# Patient Record
Sex: FEMALE | Race: OTHER | HISPANIC OR LATINO | Employment: UNEMPLOYED | URBAN - METROPOLITAN AREA
[De-identification: names, ages, dates, MRNs, and addresses within clinical notes are randomized per-mention and may not be internally consistent; named-entity substitution may affect disease eponyms.]

---

## 2022-10-05 ENCOUNTER — HOSPITAL ENCOUNTER (OUTPATIENT)
Facility: HOSPITAL | Age: 32
Setting detail: OBSERVATION
End: 2022-10-06
Attending: EMERGENCY MEDICINE
Payer: COMMERCIAL

## 2022-10-05 ENCOUNTER — APPOINTMENT (EMERGENCY)
Dept: CT IMAGING | Facility: HOSPITAL | Age: 32
End: 2022-10-05
Payer: COMMERCIAL

## 2022-10-05 DIAGNOSIS — R41.82 ALTERED MENTAL STATUS: Primary | ICD-10-CM

## 2022-10-05 DIAGNOSIS — T44.3X1A ANTICHOLINERGIC DRUG OVERDOSE: ICD-10-CM

## 2022-10-05 DIAGNOSIS — T45.0X2A INTENTIONAL DIPHENHYDRAMINE OVERDOSE, INITIAL ENCOUNTER (HCC): ICD-10-CM

## 2022-10-05 DIAGNOSIS — T14.91XA SUICIDE ATTEMPT (HCC): ICD-10-CM

## 2022-10-05 PROBLEM — T50.902A INTENTIONAL DRUG OVERDOSE (HCC): Status: ACTIVE | Noted: 2022-10-05

## 2022-10-05 LAB
ALBUMIN SERPL BCP-MCNC: 4.2 G/DL (ref 3.5–5)
ALP SERPL-CCNC: 102 U/L (ref 46–116)
ALT SERPL W P-5'-P-CCNC: 27 U/L (ref 12–78)
AMPHETAMINES SERPL QL SCN: NEGATIVE
ANION GAP SERPL CALCULATED.3IONS-SCNC: 15 MMOL/L (ref 4–13)
APAP SERPL-MCNC: <2 UG/ML (ref 10–20)
APTT PPP: 26 SECONDS (ref 23–37)
AST SERPL W P-5'-P-CCNC: 13 U/L (ref 5–45)
BARBITURATES UR QL: NEGATIVE
BASOPHILS # BLD AUTO: 0 THOUSANDS/ΜL (ref 0–0.1)
BASOPHILS NFR BLD AUTO: 0 % (ref 0–1)
BENZODIAZ UR QL: NEGATIVE
BILIRUB SERPL-MCNC: 0.2 MG/DL (ref 0.2–1)
BUN SERPL-MCNC: 8 MG/DL (ref 5–25)
CALCIUM SERPL-MCNC: 9.6 MG/DL (ref 8.3–10.1)
CHLORIDE SERPL-SCNC: 107 MMOL/L (ref 96–108)
CO2 SERPL-SCNC: 21 MMOL/L (ref 21–32)
COCAINE UR QL: NEGATIVE
CREAT SERPL-MCNC: 0.83 MG/DL (ref 0.6–1.3)
EOSINOPHIL # BLD AUTO: 0 THOUSAND/ΜL (ref 0–0.61)
EOSINOPHIL NFR BLD AUTO: 0 % (ref 0–6)
ERYTHROCYTE [DISTWIDTH] IN BLOOD BY AUTOMATED COUNT: 13.2 % (ref 11.6–15.1)
ETHANOL SERPL-MCNC: 29 MG/DL (ref 0–3)
EXT PREG TEST URINE: NEGATIVE
EXT. CONTROL ED NAV: NORMAL
GFR SERPL CREATININE-BSD FRML MDRD: 93 ML/MIN/1.73SQ M
GLUCOSE SERPL-MCNC: 71 MG/DL (ref 65–140)
HCT VFR BLD AUTO: 38 % (ref 34.8–46.1)
HGB BLD-MCNC: 12.2 G/DL (ref 11.5–15.4)
IMM GRANULOCYTES # BLD AUTO: 0.04 THOUSAND/UL (ref 0–0.2)
IMM GRANULOCYTES NFR BLD AUTO: 1 % (ref 0–2)
INR PPP: 1.07 (ref 0.84–1.19)
LYMPHOCYTES # BLD AUTO: 2.3 THOUSANDS/ΜL (ref 0.6–4.47)
LYMPHOCYTES NFR BLD AUTO: 27 % (ref 14–44)
MAGNESIUM SERPL-MCNC: 2.3 MG/DL (ref 1.6–2.6)
MCH RBC QN AUTO: 27.8 PG (ref 26.8–34.3)
MCHC RBC AUTO-ENTMCNC: 32.1 G/DL (ref 31.4–37.4)
MCV RBC AUTO: 87 FL (ref 82–98)
METHADONE UR QL: NEGATIVE
MONOCYTES # BLD AUTO: 0.63 THOUSAND/ΜL (ref 0.17–1.22)
MONOCYTES NFR BLD AUTO: 7 % (ref 4–12)
NEUTROPHILS # BLD AUTO: 5.62 THOUSANDS/ΜL (ref 1.85–7.62)
NEUTS SEG NFR BLD AUTO: 65 % (ref 43–75)
NRBC BLD AUTO-RTO: 0 /100 WBCS
OPIATES UR QL SCN: NEGATIVE
OXYCODONE+OXYMORPHONE UR QL SCN: NEGATIVE
PCP UR QL: NEGATIVE
PLATELET # BLD AUTO: 339 THOUSANDS/UL (ref 149–390)
PMV BLD AUTO: 9.1 FL (ref 8.9–12.7)
POTASSIUM SERPL-SCNC: 3.3 MMOL/L (ref 3.5–5.3)
PROT SERPL-MCNC: 8.5 G/DL (ref 6.4–8.4)
PROTHROMBIN TIME: 13.9 SECONDS (ref 11.6–14.5)
RBC # BLD AUTO: 4.39 MILLION/UL (ref 3.81–5.12)
SALICYLATES SERPL-MCNC: <3 MG/DL (ref 3–20)
SODIUM SERPL-SCNC: 143 MMOL/L (ref 135–147)
THC UR QL: NEGATIVE
WBC # BLD AUTO: 8.59 THOUSAND/UL (ref 4.31–10.16)

## 2022-10-05 PROCEDURE — 96374 THER/PROPH/DIAG INJ IV PUSH: CPT

## 2022-10-05 PROCEDURE — 82077 ASSAY SPEC XCP UR&BREATH IA: CPT | Performed by: EMERGENCY MEDICINE

## 2022-10-05 PROCEDURE — 93005 ELECTROCARDIOGRAM TRACING: CPT

## 2022-10-05 PROCEDURE — G1004 CDSM NDSC: HCPCS

## 2022-10-05 PROCEDURE — 80307 DRUG TEST PRSMV CHEM ANLYZR: CPT | Performed by: EMERGENCY MEDICINE

## 2022-10-05 PROCEDURE — 96361 HYDRATE IV INFUSION ADD-ON: CPT

## 2022-10-05 PROCEDURE — 80053 COMPREHEN METABOLIC PANEL: CPT | Performed by: EMERGENCY MEDICINE

## 2022-10-05 PROCEDURE — 81025 URINE PREGNANCY TEST: CPT | Performed by: EMERGENCY MEDICINE

## 2022-10-05 PROCEDURE — 85025 COMPLETE CBC W/AUTO DIFF WBC: CPT | Performed by: EMERGENCY MEDICINE

## 2022-10-05 PROCEDURE — 99220 PR INITIAL OBSERVATION CARE/DAY 70 MINUTES: CPT | Performed by: INTERNAL MEDICINE

## 2022-10-05 PROCEDURE — 70450 CT HEAD/BRAIN W/O DYE: CPT

## 2022-10-05 PROCEDURE — 80179 DRUG ASSAY SALICYLATE: CPT | Performed by: EMERGENCY MEDICINE

## 2022-10-05 PROCEDURE — 99285 EMERGENCY DEPT VISIT HI MDM: CPT

## 2022-10-05 PROCEDURE — 80143 DRUG ASSAY ACETAMINOPHEN: CPT | Performed by: EMERGENCY MEDICINE

## 2022-10-05 PROCEDURE — 36415 COLL VENOUS BLD VENIPUNCTURE: CPT | Performed by: EMERGENCY MEDICINE

## 2022-10-05 PROCEDURE — 83735 ASSAY OF MAGNESIUM: CPT | Performed by: EMERGENCY MEDICINE

## 2022-10-05 PROCEDURE — 85730 THROMBOPLASTIN TIME PARTIAL: CPT | Performed by: EMERGENCY MEDICINE

## 2022-10-05 PROCEDURE — 99285 EMERGENCY DEPT VISIT HI MDM: CPT | Performed by: EMERGENCY MEDICINE

## 2022-10-05 PROCEDURE — 85610 PROTHROMBIN TIME: CPT | Performed by: EMERGENCY MEDICINE

## 2022-10-05 RX ORDER — LORAZEPAM 2 MG/ML
1 INJECTION INTRAMUSCULAR ONCE
Status: COMPLETED | OUTPATIENT
Start: 2022-10-05 | End: 2022-10-05

## 2022-10-05 RX ADMIN — LORAZEPAM 1 MG: 2 INJECTION INTRAMUSCULAR; INTRAVENOUS at 23:15

## 2022-10-05 RX ADMIN — LORAZEPAM 1 MG: 2 INJECTION INTRAMUSCULAR; INTRAVENOUS at 22:31

## 2022-10-05 RX ADMIN — SODIUM CHLORIDE 1000 ML: 0.9 INJECTION, SOLUTION INTRAVENOUS at 20:44

## 2022-10-06 ENCOUNTER — HOSPITAL ENCOUNTER (INPATIENT)
Facility: HOSPITAL | Age: 32
LOS: 1 days | End: 2022-10-07
Attending: EMERGENCY MEDICINE | Admitting: EMERGENCY MEDICINE
Payer: COMMERCIAL

## 2022-10-06 VITALS
DIASTOLIC BLOOD PRESSURE: 80 MMHG | SYSTOLIC BLOOD PRESSURE: 121 MMHG | RESPIRATION RATE: 18 BRPM | WEIGHT: 203.93 LBS | OXYGEN SATURATION: 100 % | TEMPERATURE: 99.6 F | HEART RATE: 96 BPM

## 2022-10-06 DIAGNOSIS — Z00.8 MEDICAL CLEARANCE FOR PSYCHIATRIC ADMISSION: ICD-10-CM

## 2022-10-06 DIAGNOSIS — T45.0X2A INTENTIONAL DIPHENHYDRAMINE OVERDOSE, INITIAL ENCOUNTER (HCC): ICD-10-CM

## 2022-10-06 DIAGNOSIS — T14.91XA SUICIDE ATTEMPT (HCC): Primary | ICD-10-CM

## 2022-10-06 DIAGNOSIS — T45.0X2A: ICD-10-CM

## 2022-10-06 PROBLEM — Z86.59 HISTORY OF DEPRESSION: Status: ACTIVE | Noted: 2022-10-06

## 2022-10-06 LAB
ALBUMIN SERPL BCP-MCNC: 3.8 G/DL (ref 3.5–5)
ALP SERPL-CCNC: 89 U/L (ref 46–116)
ALT SERPL W P-5'-P-CCNC: 20 U/L (ref 12–78)
ANION GAP SERPL CALCULATED.3IONS-SCNC: 14 MMOL/L (ref 4–13)
AST SERPL W P-5'-P-CCNC: 17 U/L (ref 5–45)
ATRIAL RATE: 119 BPM
ATRIAL RATE: 128 BPM
ATRIAL RATE: 142 BPM
BASOPHILS # BLD AUTO: 0.01 THOUSANDS/ΜL (ref 0–0.1)
BASOPHILS NFR BLD AUTO: 0 % (ref 0–1)
BILIRUB SERPL-MCNC: 0.22 MG/DL (ref 0.2–1)
BUN SERPL-MCNC: 8 MG/DL (ref 5–25)
CALCIUM SERPL-MCNC: 9 MG/DL (ref 8.3–10.1)
CHLORIDE SERPL-SCNC: 106 MMOL/L (ref 96–108)
CO2 SERPL-SCNC: 21 MMOL/L (ref 21–32)
CREAT SERPL-MCNC: 0.69 MG/DL (ref 0.6–1.3)
EOSINOPHIL # BLD AUTO: 0 THOUSAND/ΜL (ref 0–0.61)
EOSINOPHIL NFR BLD AUTO: 0 % (ref 0–6)
ERYTHROCYTE [DISTWIDTH] IN BLOOD BY AUTOMATED COUNT: 13.5 % (ref 11.6–15.1)
GFR SERPL CREATININE-BSD FRML MDRD: 115 ML/MIN/1.73SQ M
GLUCOSE P FAST SERPL-MCNC: 62 MG/DL (ref 65–99)
GLUCOSE SERPL-MCNC: 62 MG/DL (ref 65–140)
HCT VFR BLD AUTO: 36.8 % (ref 34.8–46.1)
HGB BLD-MCNC: 11.6 G/DL (ref 11.5–15.4)
IMM GRANULOCYTES # BLD AUTO: 0.04 THOUSAND/UL (ref 0–0.2)
IMM GRANULOCYTES NFR BLD AUTO: 0 % (ref 0–2)
LYMPHOCYTES # BLD AUTO: 1.9 THOUSANDS/ΜL (ref 0.6–4.47)
LYMPHOCYTES NFR BLD AUTO: 16 % (ref 14–44)
MCH RBC QN AUTO: 28.4 PG (ref 26.8–34.3)
MCHC RBC AUTO-ENTMCNC: 31.5 G/DL (ref 31.4–37.4)
MCV RBC AUTO: 90 FL (ref 82–98)
MONOCYTES # BLD AUTO: 0.73 THOUSAND/ΜL (ref 0.17–1.22)
MONOCYTES NFR BLD AUTO: 6 % (ref 4–12)
NEUTROPHILS # BLD AUTO: 8.99 THOUSANDS/ΜL (ref 1.85–7.62)
NEUTS SEG NFR BLD AUTO: 78 % (ref 43–75)
NRBC BLD AUTO-RTO: 0 /100 WBCS
P AXIS: 41 DEGREES
P AXIS: 47 DEGREES
P AXIS: 50 DEGREES
PLATELET # BLD AUTO: 361 THOUSANDS/UL (ref 149–390)
PMV BLD AUTO: 9.7 FL (ref 8.9–12.7)
POTASSIUM SERPL-SCNC: 3.9 MMOL/L (ref 3.5–5.3)
PR INTERVAL: 142 MS
PR INTERVAL: 154 MS
PR INTERVAL: 162 MS
PROT SERPL-MCNC: 8.1 G/DL (ref 6.4–8.4)
QRS AXIS: 16 DEGREES
QRS AXIS: 21 DEGREES
QRS AXIS: 36 DEGREES
QRSD INTERVAL: 82 MS
QRSD INTERVAL: 88 MS
QRSD INTERVAL: 88 MS
QT INTERVAL: 268 MS
QT INTERVAL: 314 MS
QT INTERVAL: 344 MS
QTC INTERVAL: 412 MS
QTC INTERVAL: 458 MS
QTC INTERVAL: 483 MS
RBC # BLD AUTO: 4.09 MILLION/UL (ref 3.81–5.12)
SODIUM SERPL-SCNC: 141 MMOL/L (ref 135–147)
T WAVE AXIS: 1 DEGREES
T WAVE AXIS: 19 DEGREES
T WAVE AXIS: 9 DEGREES
VENTRICULAR RATE: 119 BPM
VENTRICULAR RATE: 128 BPM
VENTRICULAR RATE: 142 BPM
WBC # BLD AUTO: 11.67 THOUSAND/UL (ref 4.31–10.16)

## 2022-10-06 PROCEDURE — 93005 ELECTROCARDIOGRAM TRACING: CPT

## 2022-10-06 PROCEDURE — 93010 ELECTROCARDIOGRAM REPORT: CPT | Performed by: STUDENT IN AN ORGANIZED HEALTH CARE EDUCATION/TRAINING PROGRAM

## 2022-10-06 PROCEDURE — HZ2ZZZZ DETOXIFICATION SERVICES FOR SUBSTANCE ABUSE TREATMENT: ICD-10-PCS | Performed by: EMERGENCY MEDICINE

## 2022-10-06 PROCEDURE — 93010 ELECTROCARDIOGRAM REPORT: CPT | Performed by: INTERNAL MEDICINE

## 2022-10-06 PROCEDURE — 99291 CRITICAL CARE FIRST HOUR: CPT | Performed by: EMERGENCY MEDICINE

## 2022-10-06 PROCEDURE — 80053 COMPREHEN METABOLIC PANEL: CPT | Performed by: NURSE PRACTITIONER

## 2022-10-06 PROCEDURE — 85025 COMPLETE CBC W/AUTO DIFF WBC: CPT | Performed by: NURSE PRACTITIONER

## 2022-10-06 PROCEDURE — 99217 PR OBSERVATION CARE DISCHARGE MANAGEMENT: CPT | Performed by: INTERNAL MEDICINE

## 2022-10-06 PROCEDURE — 99239 HOSP IP/OBS DSCHRG MGMT >30: CPT | Performed by: INTERNAL MEDICINE

## 2022-10-06 RX ORDER — DEXTROSE, SODIUM CHLORIDE, AND POTASSIUM CHLORIDE 5; .9; .15 G/100ML; G/100ML; G/100ML
100 INJECTION INTRAVENOUS CONTINUOUS
Status: DISCONTINUED | OUTPATIENT
Start: 2022-10-06 | End: 2022-10-06

## 2022-10-06 RX ORDER — SIMETHICONE 80 MG
80 TABLET,CHEWABLE ORAL 4 TIMES DAILY PRN
Status: DISCONTINUED | OUTPATIENT
Start: 2022-10-06 | End: 2022-10-06 | Stop reason: HOSPADM

## 2022-10-06 RX ORDER — SODIUM CHLORIDE 9 MG/ML
125 INJECTION, SOLUTION INTRAVENOUS CONTINUOUS
Status: DISCONTINUED | OUTPATIENT
Start: 2022-10-06 | End: 2022-10-06 | Stop reason: HOSPADM

## 2022-10-06 RX ORDER — DOCUSATE SODIUM 100 MG/1
100 CAPSULE, LIQUID FILLED ORAL 2 TIMES DAILY PRN
Status: DISCONTINUED | OUTPATIENT
Start: 2022-10-06 | End: 2022-10-07 | Stop reason: HOSPADM

## 2022-10-06 RX ORDER — ONDANSETRON 2 MG/ML
4 INJECTION INTRAMUSCULAR; INTRAVENOUS EVERY 6 HOURS PRN
Status: CANCELLED | OUTPATIENT
Start: 2022-10-06

## 2022-10-06 RX ORDER — ONDANSETRON 2 MG/ML
4 INJECTION INTRAMUSCULAR; INTRAVENOUS EVERY 6 HOURS PRN
Status: DISCONTINUED | OUTPATIENT
Start: 2022-10-06 | End: 2022-10-06 | Stop reason: HOSPADM

## 2022-10-06 RX ORDER — SODIUM CHLORIDE 9 MG/ML
125 INJECTION, SOLUTION INTRAVENOUS CONTINUOUS
Status: CANCELLED | OUTPATIENT
Start: 2022-10-06

## 2022-10-06 RX ORDER — LORAZEPAM 2 MG/ML
1 INJECTION INTRAMUSCULAR EVERY 6 HOURS PRN
Status: DISCONTINUED | OUTPATIENT
Start: 2022-10-06 | End: 2022-10-06 | Stop reason: HOSPADM

## 2022-10-06 RX ORDER — ACETAMINOPHEN 325 MG/1
650 TABLET ORAL EVERY 6 HOURS PRN
Status: DISCONTINUED | OUTPATIENT
Start: 2022-10-06 | End: 2022-10-06

## 2022-10-06 RX ORDER — ACETAMINOPHEN 325 MG/1
650 TABLET ORAL EVERY 4 HOURS PRN
Status: CANCELLED | OUTPATIENT
Start: 2022-10-06

## 2022-10-06 RX ORDER — MAGNESIUM HYDROXIDE/ALUMINUM HYDROXICE/SIMETHICONE 120; 1200; 1200 MG/30ML; MG/30ML; MG/30ML
30 SUSPENSION ORAL EVERY 6 HOURS PRN
Status: DISCONTINUED | OUTPATIENT
Start: 2022-10-06 | End: 2022-10-07 | Stop reason: HOSPADM

## 2022-10-06 RX ORDER — LORAZEPAM 2 MG/ML
1 INJECTION INTRAMUSCULAR EVERY 6 HOURS PRN
Status: CANCELLED | OUTPATIENT
Start: 2022-10-06

## 2022-10-06 RX ORDER — ACETAMINOPHEN 325 MG/1
650 TABLET ORAL EVERY 4 HOURS PRN
Status: DISCONTINUED | OUTPATIENT
Start: 2022-10-06 | End: 2022-10-06 | Stop reason: HOSPADM

## 2022-10-06 RX ORDER — ONDANSETRON 2 MG/ML
4 INJECTION INTRAMUSCULAR; INTRAVENOUS EVERY 6 HOURS PRN
Status: DISCONTINUED | OUTPATIENT
Start: 2022-10-06 | End: 2022-10-07 | Stop reason: HOSPADM

## 2022-10-06 RX ORDER — DEXTROSE AND SODIUM CHLORIDE 5; .9 G/100ML; G/100ML
100 INJECTION, SOLUTION INTRAVENOUS CONTINUOUS
Status: DISCONTINUED | OUTPATIENT
Start: 2022-10-06 | End: 2022-10-07 | Stop reason: HOSPADM

## 2022-10-06 RX ORDER — LORAZEPAM 0.5 MG/1
0.5 TABLET ORAL EVERY 4 HOURS PRN
Status: DISCONTINUED | OUTPATIENT
Start: 2022-10-06 | End: 2022-10-06

## 2022-10-06 RX ORDER — ENOXAPARIN SODIUM 100 MG/ML
40 INJECTION SUBCUTANEOUS DAILY
Status: DISCONTINUED | OUTPATIENT
Start: 2022-10-06 | End: 2022-10-07 | Stop reason: HOSPADM

## 2022-10-06 RX ORDER — MAGNESIUM HYDROXIDE/ALUMINUM HYDROXICE/SIMETHICONE 120; 1200; 1200 MG/30ML; MG/30ML; MG/30ML
30 SUSPENSION ORAL EVERY 6 HOURS PRN
Status: DISCONTINUED | OUTPATIENT
Start: 2022-10-06 | End: 2022-10-06 | Stop reason: HOSPADM

## 2022-10-06 RX ORDER — MAGNESIUM HYDROXIDE/ALUMINUM HYDROXICE/SIMETHICONE 120; 1200; 1200 MG/30ML; MG/30ML; MG/30ML
30 SUSPENSION ORAL EVERY 6 HOURS PRN
Status: CANCELLED | OUTPATIENT
Start: 2022-10-06

## 2022-10-06 RX ORDER — ACETAMINOPHEN 325 MG/1
650 TABLET ORAL EVERY 4 HOURS PRN
Status: DISCONTINUED | OUTPATIENT
Start: 2022-10-06 | End: 2022-10-07 | Stop reason: HOSPADM

## 2022-10-06 RX ORDER — SIMETHICONE 80 MG
80 TABLET,CHEWABLE ORAL 4 TIMES DAILY PRN
Status: CANCELLED | OUTPATIENT
Start: 2022-10-06

## 2022-10-06 RX ORDER — TRAZODONE HYDROCHLORIDE 50 MG/1
50 TABLET ORAL
Status: DISCONTINUED | OUTPATIENT
Start: 2022-10-06 | End: 2022-10-07 | Stop reason: HOSPADM

## 2022-10-06 RX ORDER — VENLAFAXINE HYDROCHLORIDE 75 MG/1
75 CAPSULE, EXTENDED RELEASE ORAL 2 TIMES DAILY
COMMUNITY
End: 2022-10-12

## 2022-10-06 RX ORDER — LORAZEPAM 2 MG/ML
2 INJECTION INTRAMUSCULAR EVERY 4 HOURS PRN
Status: DISCONTINUED | OUTPATIENT
Start: 2022-10-06 | End: 2022-10-07 | Stop reason: HOSPADM

## 2022-10-06 RX ORDER — CLONIDINE HYDROCHLORIDE 0.1 MG/1
0.1 TABLET ORAL EVERY 6 HOURS PRN
Status: DISCONTINUED | OUTPATIENT
Start: 2022-10-06 | End: 2022-10-07 | Stop reason: HOSPADM

## 2022-10-06 RX ADMIN — DEXTROSE AND SODIUM CHLORIDE 100 ML/HR: 5; .9 INJECTION, SOLUTION INTRAVENOUS at 15:59

## 2022-10-06 RX ADMIN — ENOXAPARIN SODIUM 40 MG: 100 INJECTION SUBCUTANEOUS at 15:00

## 2022-10-06 RX ADMIN — SODIUM CHLORIDE 125 ML/HR: 0.9 INJECTION, SOLUTION INTRAVENOUS at 01:29

## 2022-10-06 RX ADMIN — SODIUM CHLORIDE 125 ML/HR: 0.9 INJECTION, SOLUTION INTRAVENOUS at 10:10

## 2022-10-06 RX ADMIN — LORAZEPAM 1 MG: 2 INJECTION, SOLUTION INTRAMUSCULAR; INTRAVENOUS at 01:47

## 2022-10-06 NOTE — DISCHARGE INSTR - OTHER ORDERS
If you or someone you know is struggling or in crisis, help is available  Call or text [phone] or chat [employer]  You can also reach Crisis Text Line by texting [facility] to [phone]  When you need someone to listen, the [employer] is available for 16 hours a day, 7 days a week, from the time of 7-10am and 2pm-2am   It is not available from the hours of 2am-6am and 10am-2pm  A representative can be reached at [phone]

## 2022-10-06 NOTE — ED ATTENDING ATTESTATION
10/5/2022  I, 524 Dr Gage Leblanc, DO, saw and evaluated the patient  I have discussed the patient with the resident/non-physician practitioner and agree with the resident's/non-physician practitioner's findings, Plan of Care, and MDM as documented in the resident's/non-physician practitioner's note, except where noted  All available labs and Radiology studies were reviewed  I was present for key portions of any procedure(s) performed by the resident/non-physician practitioner and I was immediately available to provide assistance  At this point I agree with the current assessment done in the Emergency Department  I have conducted an independent evaluation of this patient a history and physical is as follows:    ED Course     Patient's SO at bedside  He states that she does have a history of depression where she was admitted greater than 5 years ago outside of [address] [place] [address]  She did not have a suicide attempts at that time was making suicidal statements  At that time she was placed on medications which she has been taking  Today he reports that she was sleeping but this is normal for her  He went to work  He reports that he was receiving text messages from her stating that she was suicidal   He reports this this is not something that is abnormal   However, text messages that followed were  He did video chat with her and noticed that she was confused and altered  Patient's sister called her significant other and stated that she was sending her messages that were confusing  Patient's  called 46 for work and came to meet her here  Patient's mother-in-law at the house currently looking for pills that could be missing  It was noticed that she did notice some vodka missing  Patient on exam is alert but nonverbal   EOMI  Patient's pupils are 4-5 sluggish  She is maintaining airway and secretions  No stridor  No drooling  Patient will withdraw to pain  She does have some tremors but no clonus  She is tachycardic without any murmurs  She is tachypneic with lungs clear to auscultation bilaterally  Mucous membranes are dry  Cap refill less the 2 seconds  Patient can move bilateral upper extremities and lower extremities independently  She does not follow commands    While in the room, patient has mother and lab did call and state that she did take some sleeping pills  There is a number of Benadryl missing  Unknown dosing an unknown amount of pills  Patient is tachycardic, confused, dry c/w anticholinergic toxicity  Patient's EKG initially shows sinus tachycardic but no ischemic changes  Will continue with workup with IV fluids, continuous monitoring as well as observation/one-to-one  Once labs results will discuss with poison Control  Given patient's past history of worsening depression and statements via text message will keep on 1:1, possible admission for psych clearance and psych consult  Disclosure: Voice to text software was used in the preparation of this document and could have resulted in translational errors  Occasional wrong word or "sound a like" substitutions may have occurred due to the inherent limitations of voice recognition software  Read the chart carefully and recognize, using context, where substitutions have occurred               Critical Care Time  Procedures
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
## 2022-10-06 NOTE — UTILIZATION REVIEW
Initial Clinical Review    Admission: Date/Time/Statement:   Admission Orders (From admission, onward)     Ordered        10/05/22 2343  Place in Observation  Once                      Orders Placed This Encounter   Procedures    Place in Observation     Standing Status:   Standing     Number of Occurrences:   1     Order Specific Question:   Level of Care     Answer:   Med Surg [16]     ED Arrival Information     Expected   -    Arrival   10/5/2022 20:15    Acuity   Emergent            Means of arrival   Ambulance    Escorted by   [facility] ([address])    Service   Hospitalist    Admission type   Emergency            Arrival complaint   intoxicated           Chief Complaint   Patient presents with    Alcohol Intoxication     Patient presenting with potential alcohol intoxication and or drug use  Unknown story due to language barrier at scene  Patient not speaking, has eye movements  Potential polysubstance       Initial Presentation: 28 y o  female presents to ED via  EMS from home  Patient  Nonverbal, staring blankly, agitated, unable to get any info  Spouse called  Patient, daughter picked up phone, stated  Patient not responding,  Unable to tell what was  Wrong with her    Called  911  Prior to calling his wife he received multiple text messages from her verbalizing concern she would lose her college scholarship; she texted him stating she was having a panic attack, verbalized suicidal thoughts  He became concerned when she text him, "Thanks for being the great father that you are "  His mother went to the house and saw that she had vomited blue pills; near patient was a bottle of Smirnoff Ice and pills: diphenhydramine 50 mg  PMH  Is depression    Admit  Observation  With  Suicide attempt, Intentional benadryl overdose and plan is  1:1  Observation, pschy consult, toxicology consult, tele, patel  And IVF                ED Triage Vitals   Temperature Pulse Respirations Blood Pressure SpO2   10/05/22 2117 10/05/22 2029 10/05/22 2029 10/05/22 2029 10/05/22 2029   97 6 °F (36 4 °C) (!) 142 (!) 24 126/81 100 %      Temp Source Heart Rate Source Patient Position - Orthostatic VS BP Location FiO2 (%)   10/05/22 2117 10/05/22 2029 10/05/22 2029 10/05/22 2029 --   Oral Monitor Lying Left arm       Pain Score       10/06/22 0246       No Pain          Wt Readings from Last 1 Encounters:   10/06/22 92 5 kg (203 lb 14 8 oz)     Additional Vital Signs:   98 °F (36 7 °C) 135 Abnormal  20 123/76 92 100 % None (Room air) Lying    10/06/22 0054 97 7 °F (36 5 °C) 133 Abnormal  20 99/58 -- 100 % None (Room air) Lying   10/06/22 0029 -- 126 Abnormal  20 120/69 -- 100 % None (Room air) Lying   10/05/22 2245 -- 124 Abnormal  19 121/76 94 100 % None (Room air) Lying   10/05/22 2117 97 6 °F (36 4 °C) 132 Abnormal  -- 106/65 -- -- -- --   10/05/22 2045 -- -- -- -- -- -- None (Room air) --   10/05/22 2029 -- 142 Abnormal  24 Abnormal  126/81 -- 100 % None (Room air) Lying       Pertinent Labs/Diagnostic Test Results:   CT head without contrast   Final Result by Yola Brunson MD (10/05 2111)      No acute intracranial abnormality                    Workstation performed: [id number]               Results from last 7 days   Lab Units 10/05/22  2043   WBC Thousand/uL 8 59   HEMOGLOBIN g/dL 12 2   HEMATOCRIT % 38 0   PLATELETS Thousands/uL 339   NEUTROS ABS Thousands/µL 5 62         Results from last 7 days   Lab Units 10/05/22  2043   SODIUM mmol/L 143   POTASSIUM mmol/L 3 3*   CHLORIDE mmol/L 107   CO2 mmol/L 21   ANION GAP mmol/L 15*   BUN mg/dL 8   CREATININE mg/dL 0 83   EGFR ml/min/1 73sq m 93   CALCIUM mg/dL 9 6   MAGNESIUM mg/dL 2 3     Results from last 7 days   Lab Units 10/05/22  2043   AST U/L 13   ALT U/L 27   ALK PHOS U/L 102   TOTAL PROTEIN g/dL 8 5*   ALBUMIN g/dL 4 2   TOTAL BILIRUBIN mg/dL 0 20         Results from last 7 days   Lab Units 10/05/22  2043   GLUCOSE RANDOM mg/dL 71           Results from last 7 days   Lab Units 10/05/22  2131   PROTIME seconds 13 9   INR  1 07   PTT seconds 26           Results from last 7 days   Lab Units 10/05/22  2227   AMPH/METH  Negative   BARBITURATE UR  Negative   BENZODIAZEPINE UR  Negative   COCAINE UR  Negative   METHADONE URINE  Negative   OPIATE UR  Negative   PCP UR  Negative   THC UR  Negative     Results from last 7 days   Lab Units 10/05/22  2043   ETHANOL LVL mg/dL 29*   ACETAMINOPHEN LVL ug/mL <2*   SALICYLATE LVL mg/dL <3*             ED Treatment:   Medication Administration from 10/05/2022 2015 to 10/06/2022 0045       Date/Time Order Dose Route Action Comments     10/05/2022 2144 sodium chloride 0 9 % bolus 1,000 mL 0 mL Intravenous Stopped      10/05/2022 2044 sodium chloride 0 9 % bolus 1,000 mL 1,000 mL Intravenous New Bag      10/05/2022 2231 LORazepam (ATIVAN) injection 1 mg 1 mg Intravenous Given      10/05/2022 2315 LORazepam (ATIVAN) injection 1 mg 1 mg Intravenous Given         History reviewed  No pertinent past medical history    Present on Admission:   [facility] attempt [facility])   Intentional diphenhydramine overdose ([facility] )      Admitting Diagnosis: Alcohol intoxication ([facility] ) [F10 929]  Suicide attempt ([facility] ) Damion Reynoso  Altered mental status [R41 82]  Anticholinergic drug overdose [T44 3X1A]  Intentional diphenhydramine overdose, initial encounter ([facility] ) Derek Meyer  Age/Sex: 28 y o  female  Admission Orders:  Scheduled Medications:     Continuous IV Infusions:  sodium chloride, 125 mL/hr, Intravenous, Continuous      PRN Meds:  acetaminophen, 650 mg, Oral, Q4H PRN  aluminum-magnesium hydroxide-simethicone, 30 mL, Oral, Q6H PRN  LORazepam, 1 mg, Intravenous, Q6H PRN  ondansetron, 4 mg, Intravenous, Q6H PRN  simethicone, 80 mg, Oral, 4x Daily PRN    1:1  Observation  24 hr tele    IP CONSULT TO TOXICOLOGY  IP CONSULT TO PSYCHIATRY    Network Utilization Review Department  ATTENTION: Please call with any questions or concerns to [phone] and carefully listen to the prompts so that you are directed to the right person  All voicemails are confidential   Brianna Skipper all requests for admission clinical reviews, approved or denied determinations and any other requests to dedicated fax number below belonging to the campus where the patient is receiving treatment   List of dedicated fax numbers for the Facilities:  [address] DENIALS (Administrative/Medical Necessity) [phone]   [address] (Maternity/NICU/Pediatrics) [phone]   [address]  [address] [facility] [place] [address] [id number] [address] [place] [address] [phone]

## 2022-10-06 NOTE — DISCHARGE SUMMARY
[address] [place] [address]  Discharge- Jennifer Juarez 1990, 28 y o  female MRN: [id number]  Unit/Bed#: E2 -01 Encounter: [id number]  Primary Care Provider: No primary care provider on file  Date and time admitted to hospital: 10/5/2022  8:16 PM    * Suicide attempt [facility])  Assessment & Plan  · Suicide attempt with Benadryl overdose  · See below plan intentional diphenhydramine overdose  · Continual observation  · Psychiatry consult at [address] [place] [address]    History of depression  Assessment & Plan  · Spouse reports patient is on 2 medications, last taken this morning  No known overdose of home meds per spouse  · Will need med reconciliation    Intentional diphenhydramine overdose ([address] )  Assessment & Plan  · Spouse reports patient overdosed with Benadryl 50 mg   Drank pills with Smirnoff Ice cooler  · ER discussed with poison control, shortage of physostigmine, recommended observe for 6-8 hours, p r n  benzodiazepine for agitation  · UDS, acetaminophen and salicylate level normal  · IV hydration  · Rodriguez catheter for I&O given risk for urinary retention  · IV lorazepam p r n  agitation  · Monitor temperature closely  · Monitor on telemetry  Discussed case with toxicology recommending transfer to detox Unit at [address] [place] [address]   Transition of Care Discharge Summary - [facility]    Patient Information: Jennifer Juarez 28 y o  female MRN: [id number]  Unit/Bed#: E2 -01 Encounter: [id number]    Discharging Physician / Practitioner: Milad Thomas  PCP: No primary care provider on file    Admission Date: 10/5/2022  Discharge Date: 10/06/22    Disposition:      Inpatient Behavioral Health at [address] [place] [address] Toxicology      Reason for Admission:  Intentional drug overdose    Discharge Diagnoses:     Principal Problem:    Suicide attempt [facility])  Active Problems:    Intentional diphenhydramine overdose ([facility] )    History of depression  Resolved Problems:    * No resolved hospital problems  *      Consultations During Hospital Stay:  · IP CONSULT TO TOXICOLOGY  · IP CONSULT TO PSYCHIATRY      Procedures Performed:     · none    Medication Adjustments and Discharge Medications:  · Medication Dosing Tapers - Please refer to Discharge Medication List for details on any medication dosing tapers (if applicable to patient)  · Discharge Medication List: See after visit summary for reconciled discharge medications  Wound Care Recommendations:  When applicable, please see wound care section of After Visit Summary  Diet Recommendations at Discharge:  Diet -        Diet Orders   (From admission, onward)             Start     Ordered    10/06/22 0713  Diet Regular; Finger Foods; Finger Foods  Diet effective now        Comments: FINGER FOODS   References:    Nutrtion Support Algorithm Enteral vs  Parenteral   Question Answer Comment   Diet Type Regular    Regular Finger Foods    Other Restriction(s): Finger Foods    Special Instructions Disposable Meal    Special Instructions Plastic Utensil Only    RD to adjust diet per protocol? Yes        10/06/22 0712              Fluid Restriction - No Fluid Restriction at Discharge  Significant Findings / Test Results:     CT head without contrast    Result Date: 10/5/2022  · Impression: No acute intracranial abnormality  Workstation performed: [id number]       Hospital Course:     Vitaliy Petersen is a 28 y o  female patient who originally presented to the hospital on 10/5/2022 due to suicide attempt with intentional diphenhydramine overdose  Patient took about Benadryl 50 mg tablets, with alcohol  Found by her  to be confused and brought to the ED  Patient encephalopathic, restless discussed with toxicology, Dr Lurena Alpers recommending transfer to detox Unit to be evaluated by and manage by Toxicology    Patient will also need evaluation by Psychiatry which can likely be done at [facility] as well  Please see above problem list for further details  Condition at Discharge: good     Discharge Day Visit / Exam:     Subjective:  Patient seen examined at bedside, confused, denies any complaints    Vitals: Blood Pressure: 121/80 (10/06/22 1132)  Pulse: 96 (10/06/22 1132)  Temperature: 99 6 °F (37 6 °C) (10/06/22 1132)  Temp Source: Temporal (10/06/22 1132)  Respirations: 18 (10/06/22 1132)  Weight - Scale: 92 5 kg (203 lb 14 8 oz) (10/06/22 0054)  SpO2: 100 % (10/06/22 1132)    Physical Exam:    Constitutional: Patient is oriented to person, place and time  HEENT:  Normocephalic, atraumatic  Cardiovascular: Normal S1S2, tachycardia, No murmurs/rubs/gallops appreciated  Pulmonary:  Bilateral air entry, No rhonchi/rales/wheezing appreciated  Abdominal: Soft, Bowel sounds present, Non-tender, Non-distended  Extremities:  No cyanosis, clubbing or edema  Neurological:  Awake, alert, mydriasis    Discharge instructions/Information to patient and family:   See after visit summary section titled Discharge Instructions for information provided to patient and family  Planned Readmission: no     Discharge Statement:  I spent 35 minutes discharging the patient  This time was spent on the day of discharge  I had direct contact with the patient on the day of discharge  Greater than 50% of the total time was spent examining patient, answering all patient questions, arranging and discussing plan of care with patient as well as directly providing post-discharge instructions  Additional time then spent on discharge activities      ** Please Note: This note has been constructed using a voice recognition system **

## 2022-10-06 NOTE — ED NOTES
Patient changed into paper scrubs  Patient now verbal with  in little spurts   heard from mother and he is reporting she took diphenhydramine        Carlo Nunez RN  10/05/22 3839

## 2022-10-06 NOTE — ASSESSMENT & PLAN NOTE
· Presented to the [facility] ED yesterday 10/5/22 due to suicidal ideation and suicide attempt via Benadryl overdose (on an unknown quantity of Benadryl pills)  · Presented to the [facility] ED yesterday evening (around 9:30 PM) 10/6/22 confused, blankly staring into space, unable to follow commands  · Per chart review, the significant other's mother went to the patient's house and determined that there were a number of Benadryl (50 mg tablets) missing  · Patient was assessed in the ED and found to have signs of anticholingeric toxicity  She was found to be confused, tachycardic, dry  EKG obtained in the ED 10/5 showed sinus tachycardia without ischemic changes  Patient was started on IV fluids and admitted to the [address] [place] [address] for further management  · In the hospital the patient was continued on IV fluids (normal saline) and had a patel placed for urinary retention  She received three administrations of ativan 1 mg IM overnight and was placed into restraints overnight as she continued to try and get out of bed and tried to pull out her patel  · On assessment Cecilia reports that on 10/5 prior to ED presentation she was drinking 4 bottles of Smirnoff Ice and  took a bottle of Benadryl with the intent to die  · Admit patient to medical detox unit and continue supportive care, including multivitamin, IV fluids (maintained on D5 normal saline at 100 ml/hr), benzodiazepines (ativan 2 mg Q4HR PRN agitation or heart rate >120),  tylenol (650 mg Q4HR PRN fever, pain, headaches), Zofran (4 mg IV PRN nausea and vomiting)  · Patient will be continued on telemetry for 24 hours due to drug overdose known to cause arrhythmia  · Patient will be maintained on 1:1 continual observation at this time due to suicide attempt

## 2022-10-06 NOTE — ASSESSMENT & PLAN NOTE
· Spouse reports patient is on 2 medications, last taken this morning  No known overdose of home meds per spouse    · Will need med reconciliation
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
## 2022-10-06 NOTE — PLAN OF CARE
Problem: COPING  Goal: Pt/Family able to verbalize concerns and demonstrate effective coping strategies  Description: INTERVENTIONS:  - Assist patient/family to identify coping skills, available support systems and cultural and spiritual values  - Provide emotional support, including active listening and acknowledgement of concerns of patient and caregivers  - Reduce environmental stimuli, as able  - Provide patient education  - Assess for spiritual pain/suffering and initiate spiritual care, including notification of Pastoral Care or ravinder based community as needed  - Assess effectiveness of coping strategies  Outcome: Progressing     Problem: DECISION MAKING  Goal: Pt/Family able to effectively weigh alternatives and participate in decision making related to treatment and care  Description: INTERVENTIONS:  - Identify decision maker  - Determine when there are differences among patient's view, family's view, and healthcare provider's view of patient condition and care goals  - Facilitate patient/family articulation of goals for care  - Help patient/family identify pros/cons of alternative solutions  - Provide information as requested by patient/family  - Respect patient/family rights related to privacy and sharing information   - Serve as a liaison between patient, family and health care team  - Initiate consults as appropriate (Ethics Team, Palliative Care, Family Care Conference, etc )  Outcome: Progressing     Problem: SELF HARM  Goal: Effect of psychiatric condition will be minimized and patient will be protected from self harm  Description: INTERVENTIONS:  - Assess impact of patient's symptoms on level of functioning, self-care needs and offer support as indicated  - Assess patient/family knowledge of depression, impact on illness and need for teaching  - Provide emotional support, presence and reassurance  - Assess for possible suicidal thoughts, ideation or self-harm   If patient expresses suicidal thoughts or statements do not leave alone, notify physician/AP immediately, initiate suicide precautions, and determine need for continual observation    - initiate consults and referrals as appropriate (a mental health professional, Spiritual Care  Outcome: Progressing

## 2022-10-06 NOTE — ED NOTES
Telemetry called to the floor   Available on arrival      Major, [address]  10/06/22 0031

## 2022-10-06 NOTE — PLAN OF CARE
Problem: Prexisting or High Potential for Compromised Skin Integrity  Goal: Skin integrity is maintained or improved  Description: INTERVENTIONS:  - Identify patients at risk for skin breakdown  - Assess and monitor skin integrity  - Assess and monitor nutrition and hydration status  - Monitor labs   - Assess for incontinence   - Turn and reposition patient  - Assist with mobility/ambulation  - Relieve pressure over bony prominences  - Avoid friction and shearing  - Provide appropriate hygiene as needed including keeping skin clean and dry  - Evaluate need for skin moisturizer/barrier cream  - Collaborate with interdisciplinary team   - Patient/family teaching  - Consider wound care consult   Outcome: Progressing     Problem: SAFETY ADULT  Goal: Patient will remain free of falls  Description: INTERVENTIONS:  -  Assess patient's ability to carry out ADLs; assess patient's baseline for ADL function and identify physical deficits which impact ability to perform ADLs (bathing, care of mouth/teeth, toileting, grooming, dressing, etc )  - Assess/evaluate cause of self-care deficits   - Assess range of motion  - Assess patient's mobility; develop plan if impaired  - Assess patient's need for assistive devices and provide as appropriate  - Encourage maximum independence but intervene and supervise when necessary  - Involve family in performance of ADLs  - Assess for home care needs following discharge   - Consider OT consult to assist with ADL evaluation and planning for discharge  - Provide patient education as appropriate  Outcome: Progressing     Problem: COPING  Goal: Pt/Family able to verbalize concerns and demonstrate effective coping strategies  Description: INTERVENTIONS:  - Assist patient/family to identify coping skills, available support systems and cultural and spiritual values  - Provide emotional support, including active listening and acknowledgement of concerns of patient and caregivers  - Reduce environmental stimuli, as able  - Provide patient education  - Assess for spiritual pain/suffering and initiate spiritual care, including notification of Pastoral Care or ravinder based community as needed  - Assess effectiveness of coping strategies  Outcome: Progressing     Problem: COPING  Goal: Will report anxiety at manageable levels  Description: INTERVENTIONS:  - Administer medication as ordered  - Teach and encourage coping skills  - Provide emotional support  - Assess patient/family for anxiety and ability to cope  Outcome: Progressing     Problem: SELF HARM  Goal: Effect of psychiatric condition will be minimized and patient will be protected from self harm  Description: INTERVENTIONS:  - Assess impact of patient's symptoms on level of functioning, self-care needs and offer support as indicated  - Assess patient/family knowledge of depression, impact on illness and need for teaching  - Provide emotional support, presence and reassurance  - Assess for possible suicidal thoughts, ideation or self-harm  If patient expresses suicidal thoughts or statements do not leave alone, notify physician/AP immediately, initiate suicide precautions, and determine need for continual observation    - initiate consults and referrals as appropriate (a mental health professional, Spiritual Care  Outcome: Progressing     Problem: CONFUSION/THOUGHT DISTURBANCE  Goal: Thought disturbances (confusion, delirium, depression, dementia or psychosis) are managed to maintain or return to baseline mental status and functional level  Description: INTERVENTIONS:  - Assess for possible contributors to  thought disturbance, including but not limited to medications, infection, impaired vision or hearing, underlying metabolic abnormalities, dehydration, respiratory compromise,  psychiatric diagnoses and notify attending PHYSICAN/AP  - Monitor and intervene to maintain adequate nutrition, hydration, elimination, sleep and activity  - Decrease environmental stimuli, including noise as appropriate  - Provide frequent contacts to provide refocusing, direction and reassurance as needed  Approach patient calmly with eye contact and at their level    - [place] high risk fall precautions, aspiration precautions and other safety measures, as indicated  - If delirium suspected, notify physician/AP of change in condition and request immediate in-person evaluation  - Pursue consults as appropriate including Geriatric (campus dependent), OT for cognitive evaluation/activity planning, psychiatric, pastoral care, etc   Outcome: Progressing

## 2022-10-06 NOTE — PLAN OF CARE
Problem: SELF HARM  Goal: Effect of psychiatric condition will be minimized and patient will be protected from self harm  Description: INTERVENTIONS:  - Assess impact of patient's symptoms on level of functioning, self-care needs and offer support as indicated  - Assess patient/family knowledge of depression, impact on illness and need for teaching  - Provide emotional support, presence and reassurance  - Assess for possible suicidal thoughts, ideation or self-harm  If patient expresses suicidal thoughts or statements do not leave alone, notify physician/AP immediately, initiate suicide precautions, and determine need for continual observation  - initiate consults and referrals as appropriate (a mental health professional, Spiritual Care  Outcome: Progressing     Problem: CONFUSION/THOUGHT DISTURBANCE  Goal: Thought disturbances (confusion, delirium, depression, dementia or psychosis) are managed to maintain or return to baseline mental status and functional level  Description: INTERVENTIONS:  - Assess for possible contributors to  thought disturbance, including but not limited to medications, infection, impaired vision or hearing, underlying metabolic abnormalities, dehydration, respiratory compromise,  psychiatric diagnoses and notify attending PHYSICAN/AP  - Monitor and intervene to maintain adequate nutrition, hydration, elimination, sleep and activity  - Decrease environmental stimuli, including noise as appropriate  - Provide frequent contacts to provide refocusing, direction and reassurance as needed  Approach patient calmly with eye contact and at their level    - [place] high risk fall precautions, aspiration precautions and other safety measures, as indicated  - If delirium suspected, notify physician/AP of change in condition and request immediate in-person evaluation  - Pursue consults as appropriate including Geriatric (campus dependent), OT for cognitive evaluation/activity planning, psychiatric, pastoral care, etc   Outcome: Progressing     Problem: COPING  Goal: Pt/Family able to verbalize concerns and demonstrate effective coping strategies  Description: INTERVENTIONS:  - Assist patient/family to identify coping skills, available support systems and cultural and spiritual values  - Provide emotional support, including active listening and acknowledgement of concerns of patient and caregivers  - Reduce environmental stimuli, as able  - Provide patient education  - Assess for spiritual pain/suffering and initiate spiritual care, including notification of Pastoral Care or ravinder based community as needed  - Assess effectiveness of coping strategies  Outcome: Progressing  Goal: Will report anxiety at manageable levels  Description: INTERVENTIONS:  - Administer medication as ordered  - Teach and encourage coping skills  - Provide emotional support  - Assess patient/family for anxiety and ability to cope  Outcome: Progressing     Problem: PAIN - ADULT  Goal: Verbalizes/displays adequate comfort level or baseline comfort level  Description: Interventions:  - Encourage patient to monitor pain and request assistance  - Assess pain using appropriate pain scale  - Administer analgesics based on type and severity of pain and evaluate response  - Implement non-pharmacological measures as appropriate and evaluate response  - Consider cultural and social influences on pain and pain management  - Notify physician/advanced practitioner if interventions unsuccessful or patient reports new pain  Outcome: Progressing     Problem: Prexisting or High Potential for Compromised Skin Integrity  Goal: Skin integrity is maintained or improved  Description: INTERVENTIONS:  - Identify patients at risk for skin breakdown  - Assess and monitor skin integrity  - Assess and monitor nutrition and hydration status  - Monitor labs   - Assess for incontinence   - Turn and reposition patient  - Assist with mobility/ambulation  - Relieve pressure over bony prominences  - Avoid friction and shearing  - Provide appropriate hygiene as needed including keeping skin clean and dry  - Evaluate need for skin moisturizer/barrier cream  - Collaborate with interdisciplinary team   - Patient/family teaching  - Consider wound care consult   Outcome: Progressing

## 2022-10-06 NOTE — ASSESSMENT & PLAN NOTE
· Presented to the [facility] yesterday 10/5/22 due to suicidal ideation and suicide attempt via Benadryl overdose (on an unknown quantity of Benadryl pills)  · Presented to the [facility] ED yesterday evening (around 9:30 PM) 10/6/22 confused, blankly staring into space, unable to follow commands  · Per chart review, the significant other's mother went to the patient's house and determined that there were a number of Benadryl (50 mg tablets) missing  · On assessment Yesicaa Manual reports that on 10/5 prior to ED presentation she was drinking 4 bottles of [employer] and  took a bottle of Benadryl with the intent to die  · Reports that she has been experiencing increased depression and has been feeling suicidal due to ongoing relationship issues and family stressors as well as concerns that she would lose her college scholarship (she is currently attending college online and pursuing a degree in behavioral sciences)  Deborha Manual reports that she feels safe at her home and states that her relationship is not abusive  She states that she feels "trapped" and that the work she does around the house (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

## 2022-10-06 NOTE — H&P
[facility]  H&P Yan Juarez 1990, 28 y o  female MRN: [id number]  Unit/Bed#: 5T DETOX 501-01 Encounter: [id number]  Primary Care Provider: No primary care provider on file  Date and time admitted to hospital: 10/6/2022  1:47 PM    Reason for Admission/Principal Problem: Benadryl overdose and resulting anticholinergic toxidrome  Admitting Provider: Rocky Don  Attending Provider: Tommy Bautista DO   10/6/2022  1:47 PM    Admit patient to medical detox unit and continue supportive care, including multivitamin, IV fluids (maintained on D5 normal saline at 100 ml/hr), benzodiazepines (ativan 2 mg Q4HR PRN agitation or heart rate >120),  tylenol (650 mg Q4HR PRN fever, pain, headaches), Zofran (4 mg IV PRN nausea and vomiting)  Patient will be continued on telemetry for 24 hours due to drug overdose known to cause arrhythmia  Patient will be maintained on 1:1 continual observation at this time due to suicide attempt  Case management consultation to assist with disposition planning  * Intentional diphenhydramine overdose ([facility] [address] )  Assessment & Plan  · Presented to the [place] [address] ED yesterday 10/5/22 due to suicidal ideation and suicide attempt via Benadryl overdose (on an unknown quantity of Benadryl pills)  · Presented to the [facility] ED yesterday evening (around 9:30 PM) 10/6/22 confused, blankly staring into space, unable to follow commands  · Per chart review, the significant other's mother went to the patient's house and determined that there were a number of Benadryl (50 mg tablets) missing  · Patient was assessed in the ED and found to have signs of anticholingeric toxicity  She was found to be confused, tachycardic, dry  EKG obtained in the ED 10/5 showed sinus tachycardia without ischemic changes  Patient was started on IV fluids and admitted to the [address] [place] [address] for further management     · In the hospital the patient was continued on IV fluids (normal saline) and had a patel placed for urinary retention  She received three administrations of ativan 1 mg IM overnight and was placed into restraints overnight as she continued to try and get out of bed and tried to pull out her patel  · On assessment Cecilia reports that on 10/5 prior to ED presentation she was drinking 4 bottles of Smirnoff Ice and  took a bottle of Benadryl with the intent to die  · Admit patient to medical detox unit and continue supportive care, including multivitamin, IV fluids (maintained on D5 normal saline at 100 ml/hr), benzodiazepines (ativan 2 mg Q4HR PRN agitation or heart rate >120),  tylenol (650 mg Q4HR PRN fever, pain, headaches), Zofran (4 mg IV PRN nausea and vomiting)  · Patient will be continued on telemetry for 24 hours due to drug overdose known to cause arrhythmia  · Patient will be maintained on 1:1 continual observation at this time due to suicide attempt  History of depression  Assessment & Plan  · Per chart review, patient's significant other arrived at bedside shortly after the patient's presentation to the hospital and reported that she has a history of depression and was hospitalized for mental health in the past (approximately 5 years ago) in the setting of suicidal statements  · On assessment Cecilia reports that she has been experiencing increased depression and has been feeling suicidal due to ongoing relationship issues and family stressors as well as concerns that she would lose her college scholarship (she is currently attending college online and pursuing a degree in behavioral sciences)  Dev Blevinsfilippo reports that she feels safe at her home and states that her relationship is not abusive  She states that she feels "trapped" and that the work she does around the house (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, cleans, 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) "is never enough"   She reports that she was drinking 4 bottles of Smirnoff Ice and  took a bottle of Benadryl with the intent to die  At this time she desires to pursue inpatient psychiatric treatment for her ongoing symptoms of depression  · The patient reports that she was previously taking a medication for symptoms of depression, however she is unsure about the name of the medication  When she is asked about Effexor, she reports that she was on it in the past but it was not working for her  · At this time will hold off on starting psychiatric medication in the setting of acute overdose  · Plan to have the patient sign a 201 for inpatient psychiatric admission  · Patient is not safe for discharge at this time  · Patient will be maintained on 1:1 continual observation at this time due to suicide attempt    Suicide attempt [facility])  [address] [place] [address]  · Presented to the [place] [address] ED yesterday 10/5/22 due to suicidal ideation and suicide attempt via Benadryl overdose (on an unknown quantity of Benadryl pills)  · Presented to the [facility] ED yesterday evening (around 9:30 PM) 10/6/22 confused, blankly staring into space, unable to follow commands  · Per chart review, the significant other's mother went to the patient's house and determined that there were a number of Benadryl (50 mg tablets) missing  · On assessment Judith Spencesampson reports that on 10/5 prior to ED presentation she was drinking 4 bottles of Smirnoff Ice and  took a bottle of Benadryl with the intent to die  · Reports that she has been experiencing increased depression and has been feeling suicidal due to ongoing relationship issues and family stressors as well as concerns that she would lose her college scholarship (she is currently attending college online and pursuing a degree in behavioral sciences)  Judith Margoth reports that she feels safe at her home and states that her relationship is not abusive   She states that she feels "trapped" and that the work she does around the house (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, cleans, 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) "is never enough"  · At this time she desires to pursue inpatient psychiatric treatment for her ongoing symptoms of depression  · Plan to have the patient sign a 201 for inpatient psychiatric admission  · Patient is not safe for discharge at this time  · Patient will be maintained on 1:1 continual observation at this time due to suicide attempt  · Case management consultation to assist with disposition planning    VTE Prophylaxis: Enoxaparin (Lovenox)  / sequential compression device   Code Status: Level 1 Full Code  POLST: POLST form is not discussed and not completed at this time  Discussion with family: Patient declined to sign GÉNESIS for her boyfriend and I did not discuss the patient's care with her family at this time  I discussed with the patient the plan for ongoing monitoring and symptomatic support for benadryl overdose and anticholinergic toxidrome    Anticipated Length of Stay:  Patient will be admitted on an Inpatient basis with an anticipated length of stay of 2 midnights  Justification for Hospital Stay: benadryl overdose and anticholinergic toxidrome  Plan for patient to be transferred to inpatient psychiatry once she is medically clear  For any questions or concerns, please Tiger Text the advanced practitioner in the role of [facility] On Call    This patient qualifies for Level IV medically managed intensive inpatient services under the criteria set by the American Society of Addiction Medicine, including dimensions 1-3  The patient is in withdrawal (or is intoxicated with high risk of withdrawal), with severe and unstable medical and/or psychiatric (dual diagnosis) problems, requiring requires 24-hour medical and nursing care and the full resources of a licensed hospital       Hx and PE limited by: Patient is alert and oriented x 3 on assessment  However at times she was drowsy and confused during assessment   At times she was difficult to understand and her speech became mumbled  HPI:     This is a H&P note for Ajay Bowden, a 28year old  female with no significant past medical history and with a past psychiatric history of unspecified depression who presented to the [place] [address] ED yesterday 10/5/22 due to suicidal ideation and suicide attempt via Benadryl overdose (on an unknown quantity of Benadryl pills)  Per chart review, Nikita Fonseca presented to the [facility] ED yesterday evening (around 9:30 PM) 10/6/22 confused, blankly staring into space, unable to follow commands  Her significant other arrived at bedside shortly after the patient's presentation to the hospital and reported that she has a history of depression and was hospitalized for mental health in the past (approximately 5 years ago) in the setting of suicidal statements  Per chart review, the patient's significant other reported that he had gone to work for the day and had received text messages stating that she was suicidal  Per chart review, he attempted to video chat with her later in the day and noticed that she was confused and altered  In addition, per chart review the patient was also sending confusing text messages to her sister  As a result the patient's significant other called 911  Per chart review, the significant other's mother went to the patient's house and determined that there were a number of Benadryl (50 mg tablets) missing  Patient was assessed in the ED and found to have signs of anticholingeric toxicity  She was found to be confused, tachycardic, dry  EKG obtained in the ED 10/5 showed sinus tachycardia without ischemic changes  CT Head without Contrast 10/5/22 showed no acute intracranial abnormality  No intracranial mass, mass effect, or midline shift  Patient was started on IV fluids and admitted to the [address] [place] [address] for further management       In the hospital the patient was continued on IV fluids (normal saline) and had a patel placed for urinary retention  She received three administrations of ativan 1 mg IM overnight and was placed into restraints overnight as she continued to try and get out of bed and tried to pull out her patel  Today the patient continued to remain confused and tachycardic (HR in the 130s)  She continued to remain in restraints due to her impulsive behavior  After a discussion between internal medicine and toxicology the patient was admitted to the [address] [place] [address] detox unit today 10/6/22 for further management of anticholinergic toxidrome  Patient was seen and examined shortly after transfer  On assessment Cecilia is alert and oriented x 3 on assessment  However at times she was drowsy and confused during assessment  At times she was difficult to understand and her speech became mumbled  On assessment Cecilia reports that she has been experiencing increased depression and has been feeling suicidal due to ongoing relationship issues and family stressors as well as concerns that she would lose her college scholarship (she is currently attending college online and pursuing a degree in behavioral sciences)  Sondra Cooley reports that she feels safe at her home and states that her relationship is not abusive  She states that she feels "trapped" and that the work she does around the house (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, cleans, 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) "is never enough"  She reports that she was drinking 4 bottles of Smirnoff Ice and  took a bottle of Benadryl with the intent to die  At this time she desires to pursue inpatient psychiatric treatment for her ongoing symptoms of depression  The patient reports that she was previously taking a medication for symptoms of depression, however she is unsure about the name of the medication   When she is asked about Effexor, she reports that she was on it in the past but it was not working for her  On assessment the patient reports that she is experiencing fatigue, decreased appetite, decreased concentration, dry mouth, tremors, abdominal pain, and nausea  She reports that she has been seeing hallucinations of young children (a boy and girl) walking into the walls  I discussed with the patient the plan for ongoing monitoring and symptomatic support for benadryl overdose and anticholinergic toxidrome  Patient expressed understanding and is in agreement  Review of PDMP: Yes  Social History     Substance and Sexual Activity   Alcohol Use Yes    Comment: social     Social History     Substance and Sexual Activity   Drug Use Never     Social History     Tobacco Use   Smoking Status Never Smoker   Smokeless Tobacco Never Used       Review of Systems   Constitutional: Positive for appetite change (decreased) and fatigue  Negative for chills, diaphoresis and fever  HENT: Negative for rhinorrhea, sore throat, trouble swallowing and voice change  Dry throat   Eyes: Negative for pain and visual disturbance  Respiratory: Negative for cough, chest tightness, shortness of breath and wheezing  Cardiovascular: Negative for chest pain, palpitations and leg swelling  Gastrointestinal: Positive for abdominal pain and nausea  Negative for vomiting  Genitourinary: Positive for difficulty urinating  Musculoskeletal: Negative for arthralgias and back pain  Skin: Negative for color change and rash  Neurological: Positive for tremors  Negative for seizures, syncope, facial asymmetry and headaches  Psychiatric/Behavioral: Positive for decreased concentration, hallucinations and suicidal ideas  All other systems reviewed and are negative  Historical Information   History reviewed  No pertinent past medical history  Past Surgical History:   Procedure Laterality Date    HYSTERECTOMY       History reviewed  No pertinent family history    Social History   Marital Status: Currently in a relationship with her boyfriend  Patient reports she has five children and her boyfriend is the father of four of her children  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  Authorizing Provider   venlafaxine (EFFEXOR-XR) 75 mg 24 hr capsule Take 75 mg by mouth 2 (two) times a day   Yes Historical Provider, MD       Current Facility-Administered Medications   Medication Dose Route Frequency    acetaminophen (TYLENOL) tablet 650 mg  650 mg Oral Q4H PRN    aluminum-magnesium hydroxide-simethicone (MYLANTA) oral suspension 30 mL  30 mL Oral Q6H PRN    cloNIDine (CATAPRES) tablet 0 1 mg  0 1 mg Oral Q6H PRN    dextrose 5 % and sodium chloride 0 9 % infusion  100 mL/hr Intravenous Continuous    docusate sodium (COLACE) capsule 100 mg  100 mg Oral BID PRN    enoxaparin (LOVENOX) subcutaneous injection 40 mg  40 mg Subcutaneous Daily    LORazepam (ATIVAN) injection 2 mg  2 mg Intravenous Q4H PRN    multivitamin-minerals (CENTRUM) tablet 1 tablet  1 tablet Oral Daily    ondansetron (ZOFRAN) injection 4 mg  4 mg Intravenous Q6H PRN    traZODone (DESYREL) tablet 50 mg  50 mg Oral HS PRN       Continuous Infusions:dextrose 5 % and sodium chloride 0 9 %, 100 mL/hr, Last Rate: 100 mL/hr (10/06/22 1559)       Objective       Intake/Output Summary (Last 24 hours) at 10/6/2022 1803  Last data filed at 10/6/2022 1559  Gross per 24 hour   Intake 950 ml   Output --   Net 950 ml       Invasive Devices:   Peripheral IV 10/05/22 Right Antecubital (Active)   Site Assessment WDL 10/06/22 1300   Dressing Type Transparent;Securing device 10/06/22 1300   Line Status Infusing 10/06/22 1300   Dressing Status Dry; Intact; Clean 10/06/22 1300       Urethral Catheter 16 Fr  (Active)   Reasons to continue Urinary Catheter  Accurate I&O assessment in critically ill patients (48 hr  max) 10/06/22 1014   Goal for Removal N/A- discharging with patel 10/06/22 1014   Site Assessment Clean;Skin intact 10/06/22 1014   Patel Care Done 10/06/22 1014   Collection Container Standard drainage bag 10/06/22 1014   Securement Method Securing device (Describe) 10/06/22 1014   Output (mL) 750 mL 10/06/22 1206       Vitals   Vitals:    10/06/22 1345   BP: 119/74   TempSrc: Temporal   Pulse: (!) 109   Resp: 18   Patient Position - Orthostatic VS: Lying   Temp: 100 4 °F (38 °C)       Physical Exam  Vitals and nursing note reviewed  Exam conducted with a chaperone present  Constitutional:       General: She is not in acute distress  Appearance: Normal appearance  She is not diaphoretic  Comments: Drowsy  BMI 37   HENT:      Head: Atraumatic  Mouth/Throat:      Mouth: Mucous membranes are dry  Pharynx: No oropharyngeal exudate or posterior oropharyngeal erythema  Eyes:      General: No scleral icterus  Extraocular Movements: Extraocular movements intact  Conjunctiva/sclera: Conjunctivae normal       Comments: Mydriasis, pupils dilated and sluggishly reactive to light   Cardiovascular:      Rate and Rhythm: Tachycardia present  Pulses: Normal pulses  Heart sounds: No murmur heard  Pulmonary:      Effort: Pulmonary effort is normal  No respiratory distress  Breath sounds: No wheezing  Abdominal:      General: Bowel sounds are normal  There is no distension  Tenderness: There is abdominal tenderness (generalized)  Musculoskeletal:         General: Normal range of motion  Cervical back: Normal range of motion  Right lower leg: No edema  Left lower leg: No edema     Skin:     Capillary Refill: Capillary refill takes less than 2 seconds  Coloration: Skin is not jaundiced  Neurological:      Mental Status: She is oriented to person, place, and time  GCS: GCS eye subscore is 4  GCS verbal subscore is 5  GCS motor subscore is 6  Cranial Nerves: Cranial nerves are intact  No cranial nerve deficit, dysarthria or facial asymmetry  Sensory: Sensation is intact  Motor: Tremor (trace intention tremor) present  Coordination: Coordination normal  Finger-Nose-Finger Test normal    Psychiatric:         Attention and Perception: She perceives visual hallucinations  Mood and Affect: Mood is depressed  Behavior: Behavior is slowed  Behavior is not aggressive  Behavior is cooperative  Thought Content: Thought content includes suicidal ideation  Data:    EKG, Pathology, and Other Studies: I have personally reviewed pertinent reports  EKG: EKG performed today 10/6/22 showed sinus tachycardia, but otherwise normal EKG  Lab Results: I have personally reviewed pertinent reports          CBC ETOH     Lab Results   Component Value Date    WBC 11 67 (H) 10/06/2022    RBC 4 09 10/06/2022    HGB 11 6 10/06/2022    HCT 36 8 10/06/2022    MCV 90 10/06/2022    MCH 28 4 10/06/2022    MCHC 31 5 10/06/2022    RDW 13 5 10/06/2022     10/06/2022    MPV 9 7 10/06/2022      No results found for: LACTICACID   CMP UA         Component Value Date/Time    K 3 9 10/06/2022 0540     10/06/2022 0540    CO2 21 10/06/2022 0540    BUN 8 10/06/2022 0540    CREATININE 0 69 10/06/2022 0540         Component Value Date/Time    CALCIUM 9 0 10/06/2022 0540    ALKPHOS 89 10/06/2022 0540    AST 17 10/06/2022 0540    ALT 20 10/06/2022 0540      No results found for: CLARITYU, COLORU, SPECGRAV, PHUR, GLUCOSEU, KETONESU, BLOODU, PROTEIN UA, NITRITE, BILIRUBINUR, UROBILINOGEN, LEUKOCYTESUR, WBCUA, RBCUA, HYALINE, BACTERIA, EPIS     Liver Function Test: ASA     Lab Results Component Value Date    TBILI 0 22 10/06/2022    ALKPHOS 89 10/06/2022    AST 17 10/06/2022    ALT 20 10/06/2022    TP 8 1 10/06/2022    ALB 3 8 10/06/2022      Lab Results   Component Value Date    SALICYLATE <3 (L) 96/30/1033      Troponin APAP     No results found for: TROPONINI   Lab Results   Component Value Date    ACTMNPHEN <2 (L) 10/05/2022      VBG HCG     No results found for: PHVEN, EQQ6NSE, PO2VEN, ADA4XCU, BEVEN, M7WMXQIRP, O2TOYXN   No results found for: HCGQUANT   ABG Urine Drug Screen     No results found for: PHART, LPG2PNM, PO2ART, WVD3QSM, BEART, X1JEOEPRC, O2HGB, SOURC, HAYDEE, VTAC, ACRATE, INSPIREDAIR, PEEP   Lab Results   Component Value Date    AMPMETHUR Negative 10/05/2022    BARBTUR Negative 10/05/2022    BDZUR Negative 10/05/2022    COCAINEUR Negative 10/05/2022    METHADONEUR Negative 10/05/2022    OPIATEUR Negative 10/05/2022    PCPUR Negative 10/05/2022    THCUR Negative 10/05/2022    OXYCODONEUR Negative 10/05/2022      Lactate INR     No results found for: LACTICACID   Lab Results   Component Value Date    INR 1 07 10/05/2022      PTT Protime     Lab Results   Component Value Date/Time    PTT 26 10/05/2022 09:31 PM        Lab Results   Component Value Date/Time    PROTIME 13 9 10/05/2022 09:31 PM        Imaging Studies: I have personally reviewed pertinent reports  CT Head without Contrast 10/5/22 showed no acute intracranial abnormality  No intracranial mass, mass effect, or midline shift  Counseling / Coordination of Care  Total floor / unit time spent today 30 minutes  Greater than 50% of total time was spent with the patient and / or family counseling and / or coordination of care       Minutes of critical care time: 36 minutes  -Critical care time was exclusive of separately billable procedures and teaching time    -Critical care was necessary to treat or prevent imminent or life-threatening deterioration of the following condition: toxidrome and dehydration  -Critical care time was spent personally by me on the following activities as well as the above as per the course and rest of chart: obtaining history from patient/surrogate, development of a treatment plan, discussions with referring provider(s), evaluation of patient's response to the treatment, examination of the patient, recommending treatments and interventions, re-evaluation of the patient's condition, review of old charts, ordering/interpreting laboratory studies, ordering/interpreting of radiographic studies  ** Please Note: This note has been constructed using a voice recognition system   **

## 2022-10-06 NOTE — ASSESSMENT & PLAN NOTE
· Suicide attempt with Benadryl overdose  · See below plan intentional diphenhydramine overdose  · Continual observation  · Psychiatry consult

## 2022-10-06 NOTE — H&P
[facility]  [facility] 1990, 28 y o  female MRN: [id number]  Unit/Bed#: E2 -01 Encounter: [id number]  Primary Care Provider: No primary care provider on file  Date and time admitted to hospital: 10/5/2022  8:16 PM    * Suicide attempt [facility])  Assessment & Plan  · Suicide attempt with Benadryl overdose  · See below plan intentional diphenhydramine overdose  · Continual observation  · Psychiatry consult    Intentional diphenhydramine overdose ([facility] [address] )  Assessment & Plan  · Spouse reports patient overdosed with Benadryl 50 mg   Drank pills with Smir[employer] Ice cooler  · ER discussed with poison control, shortage of physostigmine, recommended observe for 6-8 hours, p r n  benzodiazepine for agitation  · UDS, acetaminophen and salicylate level normal  · IV hydration  · Rodriguez catheter for I&O given risk for urinary retention  · IV lorazepam p r n  agitation  · Monitor temperature closely  · Monitor on telemetry  · Continual observation  · Medical Toxicology consult    History of depression  Assessment & Plan  · Spouse reports patient is on 2 medications, last taken this morning  No known overdose of home meds per spouse  · Will need med reconciliation    VTE Prophylaxis: Low risk  / reason for no mechanical VTE prophylaxis Ambulatory   Code Status:  Full code  POLST: POLST is not applicable to this patient  Discussion with family:  Spouse at bedside    Anticipated Length of Stay:  Patient will be admitted on an Observation basis with an anticipated length of stay of  < 2 midnights  Justification for Hospital Stay:  Attempted suicide, Benadryl overdose    Total Time for Visit, including Counseling / Coordination of Care: 60 minutes  Greater than 50% of this total time spent on direct patient counseling and coordination of care      Chief Complaint:       History of Present Illness:    Hermilo Cano is a 28 y o  female who presents with Benadryl overdose  PMH as above  Patient currently nonverbal, agitated, staring blankly, unable to obtain HPI from patient  HPI obtained by spouse who is at the bedside  Spouse reports he was at work and called his wife while on his lunch break  His daughter picked up the phone stating she does not know what is wrong with her mother and started crying, phone was on video mode, he saw his wife sitting and staring blankly, she was not responding  He called his mother to check on her and called 911  Prior to calling his wife he received multiple text messages from her verbalizing concern she would lose her college scholarship; she texted him stating she was having a panic attack, verbalized suicidal thoughts  He became concerned when she text him, "Thanks for being the great father that you are "     His mother went to the house and saw that she had vomited blue pills; near patient was a bottle of Smirnoff Ice and pills: diphenhydramine 50 mg  Review of Systems:    Review of Systems   Unable to perform ROS: Psychiatric disorder       Past Medical and Surgical History:     History reviewed  No pertinent past medical history  History reviewed  No pertinent surgical history  Meds/Allergies:    Prior to Admission medications    Not on File     I have reviewed home medications with patient family member  Allergies: No Known Allergies    Social History:     Marital Status: Single   Occupation:  College student  Patient Pre-hospital Living Situation:  Resides with spouse and 4 children  Patient Pre-hospital Level of Mobility:  Ambulatory  Patient Pre-hospital Diet Restrictions:   Substance Use History:   Social History     Substance and Sexual Activity   Alcohol Use None     Social History     Tobacco Use   Smoking Status Not on file   Smokeless Tobacco Not on file     Social History     Substance and Sexual Activity   Drug Use Not on file       Family History:    History reviewed   No pertinent family history  Physical Exam:     Vitals:   Blood Pressure: 99/58 (10/06/22 0054)  Pulse: (!) 133 (10/06/22 0054)  Temperature: 97 7 °F (36 5 °C) (10/06/22 0054)  Temp Source: Temporal (10/06/22 0054)  Respirations: 20 (10/06/22 0054)  Weight - Scale: 92 5 kg (203 lb 14 8 oz) (10/06/22 0054)  SpO2: 100 % (10/06/22 0054)    Physical Exam  Constitutional:       Appearance: She is obese  Comments: Awake, nonverbal, blank stare   HENT:      Head: Normocephalic and atraumatic  Nose: No congestion or rhinorrhea  Mouth/Throat:      Mouth: Mucous membranes are moist    Eyes:      Conjunctiva/sclera: Conjunctivae normal    Cardiovascular:      Rate and Rhythm: Regular rhythm  Tachycardia present  Heart sounds: Normal heart sounds  Pulmonary:      Effort: Pulmonary effort is normal       Breath sounds: Normal breath sounds  Abdominal:      General: Bowel sounds are normal       Palpations: Abdomen is soft  Musculoskeletal:      Right lower leg: No edema  Left lower leg: No edema  Comments: Occasional tremors   Skin:     General: Skin is warm  Neurological:      Mental Status: She is alert  Comments: Currently nonverbal   Psychiatric:      Comments: Blank stare; agitation       Additional Data:     Lab Results: I have personally reviewed pertinent reports        Results from last 7 days   Lab Units 10/05/22  2043   WBC Thousand/uL 8 59   HEMOGLOBIN g/dL 12 2   HEMATOCRIT % 38 0   PLATELETS Thousands/uL 339   NEUTROS PCT % 65   LYMPHS PCT % 27   MONOS PCT % 7   EOS PCT % 0     Results from last 7 days   Lab Units 10/05/22  2043   SODIUM mmol/L 143   POTASSIUM mmol/L 3 3*   CHLORIDE mmol/L 107   CO2 mmol/L 21   BUN mg/dL 8   CREATININE mg/dL 0 83   ANION GAP mmol/L 15*   CALCIUM mg/dL 9 6   ALBUMIN g/dL 4 2   TOTAL BILIRUBIN mg/dL 0 20   ALK PHOS U/L 102   ALT U/L 27   AST U/L 13   GLUCOSE RANDOM mg/dL 71     Results from last 7 days   Lab Units 10/05/22  2131   INR  1 07 Imaging: I have personally reviewed pertinent reports  CT head without contrast   Final Result by Len Maddox MD (10/05 2111)      No acute intracranial abnormality  Workstation performed: [id number]             EKG, Pathology, and Other Studies Reviewed on Admission:   · CT head negative for acute intracranial abnormality    Allscripts / Epic Records Reviewed: Yes     ** Please Note: This note has been constructed using a voice recognition system   **

## 2022-10-06 NOTE — ASSESSMENT & PLAN NOTE
· Spouse reports patient overdosed with Benadryl 50 mg   Drank pills with Sm[employer] Ice cooler  · ER discussed with poison control, shortage of physostigmine, recommended observe for 6-8 hours, p r n  benzodiazepine for agitation      · UDS, acetaminophen and salicylate level normal  · IV hydration  · Rodriguez catheter for I&O given risk for urinary retention  · IV lorazepam p r n  agitation  · Monitor temperature closely  · Monitor on telemetry  · Continual observation  · Medical Toxicology consult

## 2022-10-06 NOTE — ED PROVIDER NOTES
History  Chief Complaint   Patient presents with    Alcohol Intoxication     Patient presenting with potential alcohol intoxication and or drug use  Unknown story due to language barrier at scene  Patient not speaking, has eye movements  Potential polysubstance     HPI    80-year-old female brought in by EMS  History is not able to be acquired as patient is not speaking  Patient's  arrived at the hospital shortly after the patient's arrival  States that the patient was texting him vague suicidal statements throughout the day  When he called her this afternoon, she seemed confused and altered  He went home and found an empty bottle of "sleeping pills" next to her  She has required psychiatric hospitalization in the past for SI but that was 3 years ago  Prior to Admission Medications   Prescriptions Last Dose Informant Patient Reported? Taking?   venlafaxine (EFFEXOR-XR) 75 mg 24 hr capsule  Spouse/Significant Other Yes Yes   Sig: Take 75 mg by mouth 2 (two) times a day      Facility-Administered Medications: None       History reviewed  No pertinent past medical history  History reviewed  No pertinent surgical history  History reviewed  No pertinent family history  I have reviewed and agree with the history as documented      E-Cigarette/Vaping     E-Cigarette/Vaping Substances           Review of Systems   Unable to perform ROS: Mental status change       Physical Exam  ED Triage Vitals   Temperature Pulse Respirations Blood Pressure SpO2   10/05/22 2117 10/05/22 2029 10/05/22 2029 10/05/22 2029 10/05/22 2029   97 6 °F (36 4 °C) (!) 142 (!) 24 126/81 100 %      Temp Source Heart Rate Source Patient Position - Orthostatic VS BP Location FiO2 (%)   10/05/22 2117 10/05/22 2029 10/05/22 2029 10/05/22 2029 --   Oral Monitor Lying Left arm       Pain Score       10/06/22 0246       No Pain             Orthostatic Vital Signs  Vitals:    10/06/22 0216 10/06/22 0754 10/06/22 1037 10/06/22 1132   BP: 123/76 121/75  121/80   Pulse: (!) 135 (!) 131 102 96   Patient Position - Orthostatic VS: Lying Sitting  Lying       Physical Exam  Vitals and nursing note reviewed  Constitutional:       General: She is not in acute distress  Appearance: She is not ill-appearing or toxic-appearing  Comments: Blankly staring into space   HENT:      Head: Normocephalic and atraumatic  Right Ear: External ear normal       Left Ear: External ear normal       Nose: Nose normal       Mouth/Throat:      Mouth: Mucous membranes are moist       Pharynx: Oropharynx is clear  Eyes:      Extraocular Movements: Extraocular movements intact  Pupils: Pupils are equal, round, and reactive to light  Cardiovascular:      Rate and Rhythm: Regular rhythm  Tachycardia present  Pulses: Normal pulses  Heart sounds: Normal heart sounds  Pulmonary:      Effort: Pulmonary effort is normal  No respiratory distress  Breath sounds: Normal breath sounds  Abdominal:      Palpations: Abdomen is soft  Tenderness: There is no abdominal tenderness  Musculoskeletal:         General: Normal range of motion  Cervical back: Normal range of motion and neck supple  Comments: Moving all 4 extremities spontaneously   Skin:     General: Skin is warm and dry  Neurological:      Mental Status: She is confused  Cranial Nerves: No cranial nerve deficit or facial asymmetry  Comments: Patient is not speaking  Does not follow commands           ED Medications  Medications   sodium chloride 0 9 % infusion (125 mL/hr Intravenous New Bag 10/6/22 1010)   acetaminophen (TYLENOL) tablet 650 mg (has no administration in time range)   ondansetron (ZOFRAN) injection 4 mg (has no administration in time range)   aluminum-magnesium hydroxide-simethicone (MYLANTA) oral suspension 30 mL (has no administration in time range)   simethicone (MYLICON) chewable tablet 80 mg (has no administration in time range)   LORazepam (ATIVAN) injection 1 mg (1 mg Intravenous Given 10/6/22 0147)   sodium chloride 0 9 % bolus 1,000 mL (0 mL Intravenous Stopped 10/5/22 2144)   LORazepam (ATIVAN) injection 1 mg (1 mg Intravenous Given 10/5/22 2231)   LORazepam (ATIVAN) injection 1 mg (1 mg Intravenous Given 10/5/22 2315)       Diagnostic Studies  Results Reviewed     Procedure Component Value Units Date/Time    Magnesium [[id number]]  (Normal) Collected: 10/05/22 2043    Lab Status: Final result Specimen: Blood from Arm, Right Updated: 10/05/22 2331     Magnesium 2 3 mg/dL     POCT pregnancy, urine [[id number]]  (Normal) Resulted: 10/05/22 2307    Lab Status: Final result Updated: 10/05/22 2308     EXT PREG TEST UR (Ref: Negative) negative     Control valid    Rapid drug screen, urine [[id number]]  (Normal) Collected: 10/05/22 2227    Lab Status: Final result Specimen: Urine, Catheter Updated: 10/05/22 2301     Amph/Meth UR Negative     Barbiturate Ur Negative     Benzodiazepine Urine Negative     Cocaine Urine Negative     Methadone Urine Negative     Opiate Urine Negative     PCP Ur Negative     THC Urine Negative     Oxycodone Urine Negative    Narrative:      FOR MEDICAL PURPOSES ONLY  IF CONFIRMATION NEEDED PLEASE CONTACT THE LAB WITHIN 5 DAYS      Drug Screen Cutoff Levels:  AMPHETAMINE/METHAMPHETAMINES  1000 ng/mL  BARBITURATES     200 ng/mL  BENZODIAZEPINES     200 ng/mL  COCAINE      300 ng/mL  METHADONE      300 ng/mL  OPIATES      300 ng/mL  PHENCYCLIDINE     25 ng/mL  THC       50 ng/mL  OXYCODONE      100 ng/mL    Protime-INR [[id number]]  (Normal) Collected: 10/05/22 2131    Lab Status: Final result Specimen: Blood from Arm, Right Updated: 10/05/22 2202     Protime 13 9 seconds      INR 1 07    APTT [[id number]]  (Normal) Collected: 10/05/22 2131    Lab Status: Final result Specimen: Blood from Arm, Right Updated: 10/05/22 2202     PTT 26 seconds     Acetaminophen level-If concentration is detectable, please discuss with medical  on call   [[id number]]  (Abnormal) Collected: 10/05/22 2043    Lab Status: Final result Specimen: Blood from Arm, Right Updated: 10/05/22 2146     Acetaminophen Level <2 ug/mL     Comprehensive metabolic panel [[id number]]  (Abnormal) Collected: 10/05/22 2043    Lab Status: Final result Specimen: Blood from Arm, Right Updated: 10/05/22 2124     Sodium 143 mmol/L      Potassium 3 3 mmol/L      Chloride 107 mmol/L      CO2 21 mmol/L      ANION GAP 15 mmol/L      BUN 8 mg/dL      Creatinine 0 83 mg/dL      Glucose 71 mg/dL      Calcium 9 6 mg/dL      AST 13 U/L      ALT 27 U/L      Alkaline Phosphatase 102 U/L      Total Protein 8 5 g/dL      Albumin 4 2 g/dL      Total Bilirubin 0 20 mg/dL      eGFR 93 ml/min/1 73sq m     Narrative:      [facility] guidelines for Chronic Kidney Disease (CKD):     Stage 1 with normal or high GFR (GFR > 90 mL/min/1 73 square meters)    Stage 2 Mild CKD (GFR = 60-89 mL/min/1 73 square meters)    Stage 3A Moderate CKD (GFR = 45-59 mL/min/1 73 square meters)    Stage 3B Moderate CKD (GFR = 30-44 mL/min/1 73 square meters)    Stage 4 Severe CKD (GFR = 15-29 mL/min/1 73 square meters)    Stage 5 End Stage CKD (GFR <15 mL/min/1 73 square meters)  Note: GFR calculation is accurate only with a steady state creatinine    Salicylate level [[id number]]  (Abnormal) Collected: 10/05/22 2043    Lab Status: Final result Specimen: Blood from Arm, Right Updated: 89/04/52 2798     Salicylate Lvl <3 mg/dL     Ethanol [[id number]]  (Abnormal) Collected: 10/05/22 2043    Lab Status: Final result Specimen: Blood from Arm, Right Updated: 10/05/22 2113     Ethanol Lvl 29 mg/dL     CBC and differential [[id number]] Collected: 10/05/22 2043    Lab Status: Final result Specimen: Blood from Arm, Right Updated: 10/05/22 2055     WBC 8 59 Thousand/uL      RBC 4 39 Million/uL      Hemoglobin 12 2 g/dL      Hematocrit 38 0 %      MCV 87 fL      MCH 27 8 pg      MCHC 32 1 g/dL RDW 13 2 %      MPV 9 1 fL      Platelets 996 Thousands/uL      nRBC 0 /100 WBCs      Neutrophils Relative 65 %      Immat GRANS % 1 %      Lymphocytes Relative 27 %      Monocytes Relative 7 %      Eosinophils Relative 0 %      Basophils Relative 0 %      Neutrophils Absolute 5 62 Thousands/µL      Immature Grans Absolute 0 04 Thousand/uL      Lymphocytes Absolute 2 30 Thousands/µL      Monocytes Absolute 0 63 Thousand/µL      Eosinophils Absolute 0 00 Thousand/µL      Basophils Absolute 0 00 Thousands/µL                  CT head without contrast   Final Result by Zaheer Zapata MD (10/05 2111)      No acute intracranial abnormality  Workstation performed: [id number]               Procedures  Procedures      ED Course  ED Course as of 10/06/22 1324   Wed Oct 05, 2022   2046 ECG 12 lead  Procedure Note: EKG  Date/Time: 10/05/22 8:47 PM   Interpreted by: Margareth Betancourt DO  Indications / Diagnosis: AMS/ OD  ECG reviewed by me, the ED Physician: yes   The EKG demonstrates:  Rhythm: Sinus tach with rate of 142  Intervals: normal intervals  Axis: normal axis  QRS/Blocks: normal QRS  ST Changes: No acute ST Changes, no STD/KRISTEN        2147 ACETAMINOPHEN LEVEL(!): <2   2204 Per poison control Remi Liter), her presentation is consistent with anticholinergic toxicity  Peak onset is between 6-8 hours but can be longer if large doses are taken  Recommended close observation, fluids, benzos as needed for agitation  SBIRT 20yo+    Flowsheet Row Most Recent Value   SBIRT (23 yo +)    In order to provide better care to our patients, we are screening all of our patients for alcohol and drug use  Would it be okay to ask you these screening questions?  Unable to answer at this time Filed at: 10/05/2022 2147                MDM  Number of Diagnoses or Management Options  Altered mental status  Anticholinergic drug overdose  Intentional diphenhydramine overdose, initial encounter [facility])  Suicide attempt [facility])  Diagnosis management comments: 79-year-old female presenting for altered mental status  Her exam is concerning for anticholinergic toxicity  Will check CBC, CMP, preg, UDS, EKG, coma panel, CT head  Will attempt to acquire further history when family arrives  Further history was acquired from the patient's , states that she was found next to an empty bottle of sleeping pills which he believes is Benadryl after making vague suicide comments via text  I believe that patient overdosed on Benadryl in a suicide attempt  Will order continuous one-to-one, speak to [place] [id number] as tox is not currently on-call  Per Solange Rice at the [place] [address], her presentation is consistent with anticholinergic toxicity  Recommended close observation with peak effect at 6-8 hours after consumption  Treat agitation as needed with benzos, Rodriguez for urinary retention  Physostigmine is currently unavailable  Case was discussed with Medicine, Medicine to admit the patient for observation, further management, psych evaluation  Disposition  Final diagnoses:    Altered mental status   Anticholinergic drug overdose   Suicide attempt [facility])   Intentional diphenhydramine overdose, initial encounter [facility])     Time reflects when diagnosis was documented in both MDM as applicable and the Disposition within this note     Time User Action Codes Description Comment    10/5/2022 11:41 PM Aramis Deluna [R41 82] Altered mental status     10/5/2022 11:41 PM Christian Parker [L45 [id number]] Anticholinergic drug overdose     10/5/2022 11:41 PM Aramis [place] [facility] Suicide attempt ([facility] [address] )     10/5/2022 11:41 PM Aramis Deluna [T45 [id number]] Intentional diphenhydramine overdose, initial encounter [facility])       ED Disposition     ED Disposition   Admit    Condition   Stable    Date/Time   Wed Oct 5, 2022 11:43 PM    Comment   Case was discussed with Vivian Dixon and the patient's admission status was agreed to be Admission Status: observation status to the service of Dr Alfaro Means             Follow-up Information    None         Current Discharge Medication List      CONTINUE these medications which have NOT CHANGED    Details   venlafaxine (EFFEXOR-XR) 75 mg 24 hr capsule Take 75 mg by mouth 2 (two) times a day           No discharge procedures on file  PDMP Review     None           ED Provider  Attending physically available and evaluated Yasir Mustafa I managed the patient along with the ED Attending      Electronically Signed by         Dominick Stephen DO  10/06/22 3020

## 2022-10-06 NOTE — TRANSPORTATION MEDICAL NECESSITY
Section I - General Information    Name of Patient: Susan Juarez                 : 1990    Medicare #:   Transport Date: 10/06/22 (PCS is valid for round trips on this date and for all repetitive trips in the 60-day range as noted below )  Origin: [address]                                                         Destination: [facility]  Is the pt's stay covered under Medicare Part A (PPS/DRG)   []     Closest appropriate facility? If no, why is transport to more distant facility required? Yes  If hospice pt, is this transport related to pt's terminal illness? NA       Section II - Medical Necessity Questionnaire  Ambulance transportation is medically necessary only if other means of transport are contraindicated or would be potentially harmful to the patient  To meet this requirement, the patient must either be "bed confined" or suffer from a condition such that transport by means other than ambulance is contraindicated by the patient's condition  The following questions must be answered by the medical professional signing below for this form to be valid:    1)  Describe the MEDICAL CONDITION (physical and/or mental) of this patient AT [address] [place] [address] that requires the patient to be transported in an ambulance and why transport by other means is contraindicated by the patient's condition:Patient is being transferred to [address] [place] [address] for increased level of care  Pt in need of tox care due to overdose of [facility] to [facility]  2) Is the patient "bed confined" as defined below? No  To be "be confined" the patient must satisfy all three of the following conditions: (1) unable to get up from bed without Assistance; AND (2) unable to ambulate; AND (3) unable to sit in a chair or wheelchair      3) Can this patient safely be transported by car or wheelchair van (i e , seated during transport without a medical attendant or monitoring)? No    4) In addition to completing questions 1-3 above, please check any of the following conditions that apply*:   *Note: supporting documentation for any boxes checked must be maintained in the patient's medical records  If hosp-hosp transfer, describe services needed at 2nd facility not available at 1st facility? Patient is confused  IV meds/fluids required   Need or possible need for restraints   Medical attendant required   Unable to tolerate seated position for time needed to transport   Cardiac monitoring required en route   Other(specify) Hospital to hospital transfer      June Kelly 19 of Physician or Healthcare Professional  I certify that the above information is true and correct based on my evaluation of this patient, and represent that the patient requires transport by ambulance and that other forms of transport are contraindicated  I understand that this information will be used by the Centers for Medicare and Medicaid Services (CMS) to support the determination of medical necessity for ambulance services, and I represent that I have personal knowledge of the patient's condition at time of transport  []  If this box is checked, I also certify that the patient is physically or mentally incapable of signing the ambulance service's claim and that the institution with which I am affiliated has furnished care, services, or assistance to the patient  My signature below is made on behalf of the patient pursuant to 42 CFR §424 36(b)(4)   In accordance with 42 CFR §424 37, the specific reason(s) that the patient is physically or mentally incapable of signing the claim form is as follows:       Signature of Physician* or Healthcare Professional______________________________________________________________  Signature Date 10/06/22 (For scheduled repetitive transports, this form is not valid for transports performed more than 60 days after this date)    Printed Name & Credentials of Physician or Healthcare Professional (MD, DO, RN, etc )________________________________  *Form must be signed by patient's attending physician for scheduled, repetitive transports   For non-repetitive, unscheduled ambulance transports, if unable to obtain the signature of the attending physician, any of the following may sign (choose appropriate option below)  [] Physician Assistant []  Clinical Nurse Specialist []  Registered Nurse  []  Nurse Practitioner  [x] Discharge Planner

## 2022-10-07 ENCOUNTER — HOSPITAL ENCOUNTER (INPATIENT)
Facility: HOSPITAL | Age: 32
LOS: 5 days | Discharge: HOME | DRG: 751 | End: 2022-10-12
Attending: STUDENT IN AN ORGANIZED HEALTH CARE EDUCATION/TRAINING PROGRAM | Admitting: STUDENT IN AN ORGANIZED HEALTH CARE EDUCATION/TRAINING PROGRAM
Payer: COMMERCIAL

## 2022-10-07 VITALS
HEIGHT: 62 IN | OXYGEN SATURATION: 97 % | WEIGHT: 203.5 LBS | SYSTOLIC BLOOD PRESSURE: 107 MMHG | RESPIRATION RATE: 20 BRPM | HEART RATE: 94 BPM | TEMPERATURE: 98.6 F | BODY MASS INDEX: 37.45 KG/M2 | DIASTOLIC BLOOD PRESSURE: 68 MMHG

## 2022-10-07 DIAGNOSIS — Z00.8 MEDICAL CLEARANCE FOR PSYCHIATRIC ADMISSION: ICD-10-CM

## 2022-10-07 DIAGNOSIS — Z86.59 HISTORY OF DEPRESSION: Primary | ICD-10-CM

## 2022-10-07 DIAGNOSIS — T45.0X2A: ICD-10-CM

## 2022-10-07 LAB
ALBUMIN SERPL BCP-MCNC: 3.5 G/DL (ref 3.5–5)
ALP SERPL-CCNC: 76 U/L (ref 43–122)
ALT SERPL W P-5'-P-CCNC: 25 U/L
ANION GAP SERPL CALCULATED.3IONS-SCNC: 8 MMOL/L (ref 5–14)
AST SERPL W P-5'-P-CCNC: 36 U/L (ref 14–36)
BILIRUB SERPL-MCNC: 0.33 MG/DL (ref 0.2–1)
BUN SERPL-MCNC: 7 MG/DL (ref 5–25)
CALCIUM SERPL-MCNC: 8.2 MG/DL (ref 8.4–10.2)
CHLORIDE SERPL-SCNC: 108 MMOL/L (ref 96–108)
CO2 SERPL-SCNC: 22 MMOL/L (ref 21–32)
CREAT SERPL-MCNC: 0.7 MG/DL (ref 0.6–1.2)
ERYTHROCYTE [DISTWIDTH] IN BLOOD BY AUTOMATED COUNT: 13.7 % (ref 11.6–15.1)
GFR SERPL CREATININE-BSD FRML MDRD: 115 ML/MIN/1.73SQ M
GLUCOSE SERPL-MCNC: 135 MG/DL (ref 70–99)
HCT VFR BLD AUTO: 35.3 % (ref 34.8–46.1)
HGB BLD-MCNC: 11.4 G/DL (ref 11.5–15.4)
MAGNESIUM SERPL-MCNC: 1.9 MG/DL (ref 1.6–2.3)
MCH RBC QN AUTO: 27.7 PG (ref 26.8–34.3)
MCHC RBC AUTO-ENTMCNC: 32.3 G/DL (ref 31.4–37.4)
MCV RBC AUTO: 86 FL (ref 82–98)
PHOSPHATE SERPL-MCNC: 2.6 MG/DL (ref 2.5–4.8)
PLATELET # BLD AUTO: 327 THOUSANDS/UL (ref 149–390)
PMV BLD AUTO: 9.5 FL (ref 8.9–12.7)
POTASSIUM SERPL-SCNC: 3.2 MMOL/L (ref 3.5–5.3)
PROT SERPL-MCNC: 6.6 G/DL (ref 6.4–8.4)
RBC # BLD AUTO: 4.11 MILLION/UL (ref 3.81–5.12)
SARS-COV-2 RNA RESP QL NAA+PROBE: NEGATIVE
SODIUM SERPL-SCNC: 138 MMOL/L (ref 135–147)
WBC # BLD AUTO: 8.67 THOUSAND/UL (ref 4.31–10.16)

## 2022-10-07 PROCEDURE — U0003 INFECTIOUS AGENT DETECTION BY NUCLEIC ACID (DNA OR RNA); SEVERE ACUTE RESPIRATORY SYNDROME CORONAVIRUS 2 (SARS-COV-2) (CORONAVIRUS DISEASE [COVID-19]), AMPLIFIED PROBE TECHNIQUE, MAKING USE OF HIGH THROUGHPUT TECHNOLOGIES AS DESCRIBED BY CMS-2020-01-R: HCPCS | Performed by: PHYSICIAN ASSISTANT

## 2022-10-07 PROCEDURE — 80053 COMPREHEN METABOLIC PANEL: CPT

## 2022-10-07 PROCEDURE — NC001 PR NO CHARGE: Performed by: EMERGENCY MEDICINE

## 2022-10-07 PROCEDURE — 83735 ASSAY OF MAGNESIUM: CPT

## 2022-10-07 PROCEDURE — U0005 INFEC AGEN DETEC AMPLI PROBE: HCPCS | Performed by: PHYSICIAN ASSISTANT

## 2022-10-07 PROCEDURE — 99239 HOSP IP/OBS DSCHRG MGMT >30: CPT | Performed by: EMERGENCY MEDICINE

## 2022-10-07 PROCEDURE — 84100 ASSAY OF PHOSPHORUS: CPT

## 2022-10-07 PROCEDURE — 85027 COMPLETE CBC AUTOMATED: CPT

## 2022-10-07 RX ORDER — POLYETHYLENE GLYCOL 3350 17 G/17G
17 POWDER, FOR SOLUTION ORAL DAILY PRN
Status: DISCONTINUED | OUTPATIENT
Start: 2022-10-08 | End: 2022-10-07

## 2022-10-07 RX ORDER — OLANZAPINE 5 MG/1
2.5 TABLET ORAL
Status: CANCELLED | OUTPATIENT
Start: 2022-10-07

## 2022-10-07 RX ORDER — MINERAL OIL AND PETROLATUM 150; 830 MG/G; MG/G
1 OINTMENT OPHTHALMIC
Status: CANCELLED | OUTPATIENT
Start: 2022-10-07

## 2022-10-07 RX ORDER — BENZTROPINE MESYLATE 1 MG/1
1 TABLET ORAL
Status: DISCONTINUED | OUTPATIENT
Start: 2022-10-07 | End: 2022-10-12 | Stop reason: HOSPADM

## 2022-10-07 RX ORDER — POLYETHYLENE GLYCOL 3350 17 G/17G
17 POWDER, FOR SOLUTION ORAL DAILY PRN
Status: CANCELLED | OUTPATIENT
Start: 2022-10-07

## 2022-10-07 RX ORDER — POTASSIUM CHLORIDE 20 MEQ/1
40 TABLET, EXTENDED RELEASE ORAL ONCE
Status: COMPLETED | OUTPATIENT
Start: 2022-10-07 | End: 2022-10-07

## 2022-10-07 RX ORDER — AMOXICILLIN 250 MG
1 CAPSULE ORAL DAILY PRN
Status: CANCELLED | OUTPATIENT
Start: 2022-10-07

## 2022-10-07 RX ORDER — MAGNESIUM HYDROXIDE/ALUMINUM HYDROXICE/SIMETHICONE 120; 1200; 1200 MG/30ML; MG/30ML; MG/30ML
30 SUSPENSION ORAL EVERY 4 HOURS PRN
Status: DISCONTINUED | OUTPATIENT
Start: 2022-10-07 | End: 2022-10-12 | Stop reason: HOSPADM

## 2022-10-07 RX ORDER — CALCIUM GLUCONATE 20 MG/ML
2 INJECTION, SOLUTION INTRAVENOUS ONCE
Status: COMPLETED | OUTPATIENT
Start: 2022-10-07 | End: 2022-10-07

## 2022-10-07 RX ORDER — BENZTROPINE MESYLATE 0.5 MG/1
1 TABLET ORAL
Status: CANCELLED | OUTPATIENT
Start: 2022-10-07

## 2022-10-07 RX ORDER — OLANZAPINE 10 MG/1
2.5 INJECTION, POWDER, LYOPHILIZED, FOR SOLUTION INTRAMUSCULAR
Status: DISCONTINUED | OUTPATIENT
Start: 2022-10-07 | End: 2022-10-12 | Stop reason: HOSPADM

## 2022-10-07 RX ORDER — HYDROXYZINE 50 MG/1
50 TABLET, FILM COATED ORAL
Status: DISCONTINUED | OUTPATIENT
Start: 2022-10-07 | End: 2022-10-12 | Stop reason: HOSPADM

## 2022-10-07 RX ORDER — ACETAMINOPHEN 325 MG/1
650 TABLET ORAL EVERY 4 HOURS PRN
Status: DISCONTINUED | OUTPATIENT
Start: 2022-10-07 | End: 2022-10-12 | Stop reason: HOSPADM

## 2022-10-07 RX ORDER — ACETAMINOPHEN 325 MG/1
650 TABLET ORAL EVERY 6 HOURS PRN
Status: DISCONTINUED | OUTPATIENT
Start: 2022-10-07 | End: 2022-10-12 | Stop reason: HOSPADM

## 2022-10-07 RX ORDER — AMOXICILLIN 250 MG
1 CAPSULE ORAL DAILY PRN
Status: DISCONTINUED | OUTPATIENT
Start: 2022-10-07 | End: 2022-10-08

## 2022-10-07 RX ORDER — POLYETHYLENE GLYCOL 3350 17 G/17G
17 POWDER, FOR SOLUTION ORAL DAILY PRN
Status: DISCONTINUED | OUTPATIENT
Start: 2022-10-08 | End: 2022-10-07 | Stop reason: HOSPADM

## 2022-10-07 RX ORDER — ACETAMINOPHEN 325 MG/1
975 TABLET ORAL EVERY 6 HOURS PRN
Status: DISCONTINUED | OUTPATIENT
Start: 2022-10-07 | End: 2022-10-12 | Stop reason: HOSPADM

## 2022-10-07 RX ORDER — HYDROXYZINE 50 MG/1
100 TABLET, FILM COATED ORAL
Status: CANCELLED | OUTPATIENT
Start: 2022-10-07

## 2022-10-07 RX ORDER — OLANZAPINE 5 MG/1
5 TABLET ORAL
Status: CANCELLED | OUTPATIENT
Start: 2022-10-07

## 2022-10-07 RX ORDER — VENLAFAXINE HYDROCHLORIDE 75 MG/1
150 CAPSULE, EXTENDED RELEASE ORAL DAILY
Status: CANCELLED | OUTPATIENT
Start: 2022-10-08

## 2022-10-07 RX ORDER — POLYETHYLENE GLYCOL 3350 17 G/17G
17 POWDER, FOR SOLUTION ORAL DAILY PRN
Status: DISCONTINUED | OUTPATIENT
Start: 2022-10-07 | End: 2022-10-08

## 2022-10-07 RX ORDER — OLANZAPINE 2.5 MG/1
2.5 TABLET ORAL
Status: DISCONTINUED | OUTPATIENT
Start: 2022-10-07 | End: 2022-10-12 | Stop reason: HOSPADM

## 2022-10-07 RX ORDER — BENZTROPINE MESYLATE 1 MG/ML
1 INJECTION INTRAMUSCULAR; INTRAVENOUS
Status: CANCELLED | OUTPATIENT
Start: 2022-10-07

## 2022-10-07 RX ORDER — HYDROXYZINE 50 MG/1
50 TABLET, FILM COATED ORAL
Status: CANCELLED | OUTPATIENT
Start: 2022-10-07

## 2022-10-07 RX ORDER — DIPHENHYDRAMINE HYDROCHLORIDE 50 MG/ML
50 INJECTION INTRAMUSCULAR; INTRAVENOUS EVERY 6 HOURS PRN
Status: DISCONTINUED | OUTPATIENT
Start: 2022-10-07 | End: 2022-10-12 | Stop reason: HOSPADM

## 2022-10-07 RX ORDER — POLYETHYLENE GLYCOL 3350 17 G/17G
17 POWDER, FOR SOLUTION ORAL DAILY PRN
Status: CANCELLED | OUTPATIENT
Start: 2022-10-08

## 2022-10-07 RX ORDER — DIPHENHYDRAMINE HYDROCHLORIDE 50 MG/ML
50 INJECTION INTRAMUSCULAR; INTRAVENOUS EVERY 6 HOURS PRN
Status: CANCELLED | OUTPATIENT
Start: 2022-10-07

## 2022-10-07 RX ORDER — MAGNESIUM HYDROXIDE/ALUMINUM HYDROXICE/SIMETHICONE 120; 1200; 1200 MG/30ML; MG/30ML; MG/30ML
30 SUSPENSION ORAL EVERY 4 HOURS PRN
Status: CANCELLED | OUTPATIENT
Start: 2022-10-07

## 2022-10-07 RX ORDER — HYDROXYZINE HYDROCHLORIDE 25 MG/1
25 TABLET, FILM COATED ORAL
Status: CANCELLED | OUTPATIENT
Start: 2022-10-07

## 2022-10-07 RX ORDER — VENLAFAXINE HYDROCHLORIDE 150 MG/1
150 CAPSULE, EXTENDED RELEASE ORAL DAILY
Status: DISCONTINUED | OUTPATIENT
Start: 2022-10-08 | End: 2022-10-08

## 2022-10-07 RX ORDER — OLANZAPINE 10 MG/1
5 INJECTION, POWDER, LYOPHILIZED, FOR SOLUTION INTRAMUSCULAR
Status: CANCELLED | OUTPATIENT
Start: 2022-10-07

## 2022-10-07 RX ORDER — BENZTROPINE MESYLATE 1 MG/ML
1 INJECTION INTRAMUSCULAR; INTRAVENOUS
Status: DISCONTINUED | OUTPATIENT
Start: 2022-10-07 | End: 2022-10-12 | Stop reason: HOSPADM

## 2022-10-07 RX ORDER — LORAZEPAM 2 MG/ML
2 INJECTION INTRAMUSCULAR EVERY 6 HOURS PRN
Status: CANCELLED | OUTPATIENT
Start: 2022-10-07

## 2022-10-07 RX ORDER — POLYETHYLENE GLYCOL 3350 17 G/17G
17 POWDER, FOR SOLUTION ORAL ONCE
Status: DISCONTINUED | OUTPATIENT
Start: 2022-10-07 | End: 2022-10-07 | Stop reason: HOSPADM

## 2022-10-07 RX ORDER — HYDROXYZINE 50 MG/1
100 TABLET, FILM COATED ORAL
Status: DISCONTINUED | OUTPATIENT
Start: 2022-10-07 | End: 2022-10-12 | Stop reason: HOSPADM

## 2022-10-07 RX ORDER — OLANZAPINE 5 MG/1
5 TABLET ORAL
Status: DISCONTINUED | OUTPATIENT
Start: 2022-10-07 | End: 2022-10-12 | Stop reason: HOSPADM

## 2022-10-07 RX ORDER — LANOLIN ALCOHOL/MO/W.PET/CERES
3 CREAM (GRAM) TOPICAL
Status: DISCONTINUED | OUTPATIENT
Start: 2022-10-07 | End: 2022-10-12 | Stop reason: HOSPADM

## 2022-10-07 RX ORDER — ACETAMINOPHEN 325 MG/1
650 TABLET ORAL EVERY 6 HOURS PRN
Status: CANCELLED | OUTPATIENT
Start: 2022-10-07

## 2022-10-07 RX ORDER — VENLAFAXINE HYDROCHLORIDE 75 MG/1
150 CAPSULE, EXTENDED RELEASE ORAL DAILY
Status: DISCONTINUED | OUTPATIENT
Start: 2022-10-07 | End: 2022-10-07 | Stop reason: HOSPADM

## 2022-10-07 RX ORDER — MINERAL OIL AND PETROLATUM 150; 830 MG/G; MG/G
1 OINTMENT OPHTHALMIC
Status: DISCONTINUED | OUTPATIENT
Start: 2022-10-07 | End: 2022-10-12 | Stop reason: HOSPADM

## 2022-10-07 RX ORDER — LORAZEPAM 2 MG/ML
2 INJECTION INTRAMUSCULAR EVERY 6 HOURS PRN
Status: DISCONTINUED | OUTPATIENT
Start: 2022-10-07 | End: 2022-10-12 | Stop reason: HOSPADM

## 2022-10-07 RX ORDER — LANOLIN ALCOHOL/MO/W.PET/CERES
3 CREAM (GRAM) TOPICAL
Status: CANCELLED | OUTPATIENT
Start: 2022-10-07

## 2022-10-07 RX ORDER — ACETAMINOPHEN 325 MG/1
975 TABLET ORAL EVERY 6 HOURS PRN
Status: CANCELLED | OUTPATIENT
Start: 2022-10-07

## 2022-10-07 RX ORDER — ACETAMINOPHEN 325 MG/1
650 TABLET ORAL EVERY 4 HOURS PRN
Status: CANCELLED | OUTPATIENT
Start: 2022-10-07

## 2022-10-07 RX ORDER — BISACODYL 10 MG
10 SUPPOSITORY, RECTAL RECTAL DAILY PRN
Status: CANCELLED | OUTPATIENT
Start: 2022-10-07

## 2022-10-07 RX ORDER — BISACODYL 10 MG
10 SUPPOSITORY, RECTAL RECTAL DAILY PRN
Status: DISCONTINUED | OUTPATIENT
Start: 2022-10-07 | End: 2022-10-12 | Stop reason: HOSPADM

## 2022-10-07 RX ORDER — OLANZAPINE 10 MG/1
2.5 INJECTION, POWDER, LYOPHILIZED, FOR SOLUTION INTRAMUSCULAR
Status: CANCELLED | OUTPATIENT
Start: 2022-10-07

## 2022-10-07 RX ORDER — OLANZAPINE 10 MG/1
5 INJECTION, POWDER, LYOPHILIZED, FOR SOLUTION INTRAMUSCULAR
Status: DISCONTINUED | OUTPATIENT
Start: 2022-10-07 | End: 2022-10-12 | Stop reason: HOSPADM

## 2022-10-07 RX ORDER — HYDROXYZINE HYDROCHLORIDE 25 MG/1
25 TABLET, FILM COATED ORAL
Status: DISCONTINUED | OUTPATIENT
Start: 2022-10-07 | End: 2022-10-12 | Stop reason: HOSPADM

## 2022-10-07 RX ADMIN — MULTIPLE VITAMINS W/ MINERALS TAB 1 TABLET: TAB ORAL at 08:01

## 2022-10-07 RX ADMIN — CALCIUM GLUCONATE 2 G: 20 INJECTION, SOLUTION INTRAVENOUS at 07:50

## 2022-10-07 RX ADMIN — VENLAFAXINE HYDROCHLORIDE 150 MG: 75 CAPSULE, EXTENDED RELEASE ORAL at 11:47

## 2022-10-07 RX ADMIN — DOCUSATE SODIUM 100 MG: 100 CAPSULE, LIQUID FILLED ORAL at 08:05

## 2022-10-07 RX ADMIN — POTASSIUM CHLORIDE 40 MEQ: 1500 TABLET, EXTENDED RELEASE ORAL at 07:57

## 2022-10-07 RX ADMIN — DEXTROSE AND SODIUM CHLORIDE 100 ML/HR: 5; .9 INJECTION, SOLUTION INTRAVENOUS at 01:51

## 2022-10-07 NOTE — PLAN OF CARE
Pt is a new admission      Problem: SELF HARM/SUICIDALITY  Goal: Will have no self-injury during hospital stay  Description: INTERVENTIONS:  - Q 15 MINUTES: Routine safety checks  - Q WAKING SHIFT & PRN: Assess risk to determine if routine checks are adequate to maintain patient safety  - Encourage patient to participate actively in care by formulating a plan to combat response to suicidal ideation, identify supports and resources  Outcome: Progressing     Problem: DEPRESSION  Goal: Will be euthymic at discharge  Description: INTERVENTIONS:  - Administer medication as ordered  - Provide emotional support via 1:1 interaction with staff  - Encourage involvement in milieu/groups/activities  - Monitor for social isolation  Outcome: Progressing     Problem: ANXIETY  Goal: Will report anxiety at manageable levels  Description: INTERVENTIONS:  - Administer medication as ordered  - Teach and encourage coping skills  - Provide emotional support  - Assess patient/family for anxiety and ability to cope  Outcome: Progressing  Goal: By discharge: Patient will verbalize 2 strategies to deal with anxiety  Description: Interventions:  - Identify any obvious source/trigger to anxiety  - Staff will assist patient in applying identified coping technique/skills  - Encourage attendance of scheduled groups and activities  Outcome: Progressing     Problem: SLEEP DISTURBANCE  Goal: Will exhibit normal sleeping pattern  Description: Interventions:  -  Assess the patients sleep pattern, noting recent changes  - Administer medication as ordered  - Decrease environmental stimuli, including noise, as appropriate during the night  - Encourage the patient to actively participate in unit groups and or exercise during the day to enhance ability to achieve adequate sleep at night  - Assess the patient, in the morning, encouraging a description of sleep experience  Outcome: Progressing     Problem: SELF CARE DEFICIT  Goal: Return ADL status to a safe level of function  Description: INTERVENTIONS:  - Administer medication as ordered  - Assess ADL deficits and provide assistive devices as needed  - Obtain PT/OT consults as needed  - Assist and instruct patient to increase activity and self care as tolerated  Outcome: Progressing

## 2022-10-07 NOTE — CASE MANAGEMENT
Cm informed by medical staff pt ready for referral to inpatient psychiatric  Cm faxed pt referral to [address] [place] [address] and referral for behavioral health inpatient treatment

## 2022-10-07 NOTE — ASSESSMENT & PLAN NOTE
· Presented to the [facility] ED yesterday 10/5/22 due to suicidal ideation and suicide attempt via Benadryl overdose (on an unknown quantity of Benadryl pills)  · Presented to the [facility] ED yesterday evening (around 9:30 PM) 10/5/22 confused, blankly staring into space, unable to follow commands  · Per chart review, the significant other's mother went to the patient's house and determined that there were a number of Benadryl (50 mg tablets) missing  · On assessment Elizabeth Rojas reports that on 10/5 prior to ED presentation she was drinking 4 bottles of Smirnoff Ice and  took a bottle of Benadryl with the intent to die  · Reports that she has been experiencing increased depression and has been feeling suicidal due to ongoing relationship issues and family stressors as well as concerns that she would lose her college scholarship (she is currently attending college online and pursuing a degree in behavioral sciences)  Elizabeth Rojas reports that she feels safe at her home and states that her relationship is not abusive  She states that she feels "trapped" and that the work she does around the house (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, cleans, 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) "is never enough"  · At this time she desires to pursue inpatient psychiatric treatment for her ongoing symptoms of depression  · 201 for inpatient psychiatric admission has been signed and is in the patient's chart  · Patient is not safe for discharge at this time  · Patient will be maintained on 1:1 continual observation at this time due to suicide attempt  · Patient has been restarted on venlafaxine 150 mg daily for ongoing symptoms of depression  · Case management consultation to assist with disposition planning  · Tentatively plan to medically clear the patient later today for inpatient psychiatry provided has adequate oral intake and is able to use the bathroom without difficulty

## 2022-10-07 NOTE — NURSING NOTE
[place]   None   No belongings were dropped off with pt no cell phone or wallet or purse with pt upon arrival   PT verified she has no belongings

## 2022-10-07 NOTE — PLAN OF CARE
Problem: COPING  Goal: Pt/Family able to verbalize concerns and demonstrate effective coping strategies  Description: INTERVENTIONS:  - Assist patient/family to identify coping skills, available support systems and cultural and spiritual values  - Provide emotional support, including active listening and acknowledgement of concerns of patient and caregivers  - Reduce environmental stimuli, as able  - Provide patient education  - Assess for spiritual pain/suffering and initiate spiritual care, including notification of Pastoral Care or ravinder based community as needed  - Assess effectiveness of coping strategies  Outcome: Progressing  Goal: Will report anxiety at manageable levels  Description: INTERVENTIONS:  - Administer medication as ordered  - Teach and encourage coping skills  - Provide emotional support  - Assess patient/family for anxiety and ability to cope  Outcome: Progressing     Problem: DECISION MAKING  Goal: Pt/Family able to effectively weigh alternatives and participate in decision making related to treatment and care  Description: INTERVENTIONS:  - Identify decision maker  - Determine when there are differences among patient's view, family's view, and healthcare provider's view of patient condition and care goals  - Facilitate patient/family articulation of goals for care  - Help patient/family identify pros/cons of alternative solutions  - Provide information as requested by patient/family  - Respect patient/family rights related to privacy and sharing information   - Serve as a liaison between patient, family and health care team  - Initiate consults as appropriate (Ethics Team, Palliative Care, Family Care Conference, etc )  Outcome: Progressing     Problem: Depression - IP adult  Goal: Effects of depression will be minimized  Description: INTERVENTIONS:  - Assess impact of patient's symptoms on level of functioning, self-care needs and offer support as indicated  - Assess patient/family knowledge of depression, impact on illness and need for teaching  - Provide emotional support, presence and reassurance  - Assess for possible suicidal thoughts, ideation or self-harm   If patient expresses suicidal thoughts or statements do not leave alone, notify physician/AP immediately, initiate Suicide Precautions, and determine need for continual observation   - Initiate consults and referrals as appropriate (a mental health professional, Spiritual Care)  - Administer medication as ordered  Outcome: Progressing

## 2022-10-07 NOTE — ASSESSMENT & PLAN NOTE
· Presented to the [facility]  ED yesterday 10/5/22 due to suicidal ideation and suicide attempt via Benadryl overdose (on an unknown quantity of Benadryl pills)  · Presented to the [facility] ED yesterday evening (around 9:30 PM) 10/5/22 confused, blankly staring into space, unable to follow commands  · Per chart review, the significant other's mother went to the patient's house and determined that there were a number of Benadryl (50 mg tablets) missing  · On assessment Trupti Tavarez reports that on 10/5 prior to ED presentation she was drinking 4 bottles of Smirnoff Ice and  took a bottle of Benadryl with the intent to die  · Reports that she has been experiencing increased depression and has been feeling suicidal due to ongoing relationship issues and family stressors as well as concerns that she would lose her college scholarship (she is currently attending college online and pursuing a degree in behavioral sciences)  Trupti Tavarez reports that she feels safe at her home and states that her relationship is not abusive  She states that she feels "trapped" and that the work she does around the house (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, cleans, 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) "is never enough"  · At this time she desires to pursue inpatient psychiatric treatment for her ongoing symptoms of depression  · 201 for inpatient psychiatric admission has been signed and is in the patient's chart  · Patient is not safe for discharge at this time  · Patient will be maintained on 1:1 continual observation at this time due to suicide attempt  · Patient has been restarted on venlafaxine 150 mg daily for ongoing symptoms of depression  · Case management consultation to assist with disposition planning    · Patient has been medically cleared, stable for transfer to inpatient psychiatry

## 2022-10-07 NOTE — ASSESSMENT & PLAN NOTE
· Per chart review, patient's significant other arrived at bedside shortly after the patient's presentation to the hospital and reported that she has a history of depression and was hospitalized for mental health in the past (approximately 5 years ago) in the setting of suicidal statements  · On assessment Cecilia reports that she has been experiencing increased depression and has been feeling suicidal due to ongoing relationship issues and family stressors as well as concerns that she would lose her college scholarship (she is currently attending college online and pursuing a degree in behavioral sciences)  Mayte Cayden reports that she feels safe at her home and states that her relationship is not abusive  She states that she feels "trapped" and that the work she does around the house (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, cleans, 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) "is never enough"  She reports that she was drinking 4 bottles of Smirnoff Ice and  took a bottle of Benadryl with the intent to die  At this time she desires to pursue inpatient psychiatric treatment for her ongoing symptoms of depression  · 201 for inpatient psychiatric admission has been signed and is in the patient's chart  · Patient is not safe for discharge at this time  · Patient will be maintained on 1:1 continual observation at this time due to suicide attempt  · Patient has been restarted on venlafaxine 150 mg daily for ongoing symptoms of depression  · Case management consultation to assist with disposition planning  · Tentatively plan to medically clear the patient later today for inpatient psychiatry provided has adequate oral intake and is able to use the bathroom without difficulty

## 2022-10-07 NOTE — ASSESSMENT & PLAN NOTE
· Presented to the [facility]  ED 10/5/22 due to suicidal ideation and suicide attempt via Benadryl overdose (on an unknown quantity of Benadryl pills)  · Presented to the [facility] ED the evening (around 9:30 PM) 10/5/22 confused, blankly staring into space, unable to follow commands  · Per chart review, the significant other's mother went to the patient's house and determined that there were a number of Benadryl (50 mg tablets) missing  · Patient was assessed in the ED and found to have signs of anticholingeric toxicity  She was found to be confused, tachycardic, dry  EKG obtained in the ED 10/5 showed sinus tachycardia without ischemic changes  Patient was started on IV fluids and admitted to the [address] [place] [address] for further management  In the hospital the patient was continued on IV fluids (normal saline) and had a paetl placed for urinary retention  She received three administrations of ativan 1 mg IM overnight 10/5 to 10/6 and was placed into restraints overnight as she continued to try and get out of bed and tried to pull out her patel  · Patient was admitted to the detox unit on 10/6/22 and supportive care was provided  She was maintained on a one-to-one continue observation due to suicide attempt  She was monitored on telemetry due to drug overdose known to cause arrhythmia  She was treated with multivitamins and electrolyte supplementation  She was maintained on IV fluids (D5 normal saline)  On the detox unit the patient's tachycardia improved with supportive care and she has not required Ativan administration for tachycardia or for agitation     · On assessment Cecilia reports that prior to ED presentation she was drinking 4 bottles of Smirnoff Ice and  took a bottle of Benadryl with the intent to die  · Continue supportive care, including multivitamin, IV fluids (maintained on D5 normal saline at 100 ml/hr)  · At this time discontinue telemetry  · Patient will be maintained on 1:1 continual observation at this time due to suicide attempt  · Rodriguez will be removed and a voiding trial will be performed to ensure that she is able to urinate without difficulty  At this time will continue to encourage oral intake  Tentatively plan to medically clear the patient later today for inpatient psychiatry provided has adequate oral intake and is able to use the bathroom without difficulty

## 2022-10-07 NOTE — NURSING NOTE
Pt is a 201 from [address] [place] [address] Detox unit  Pt arrived to ED after overdosing on Benadryl and alcohol  Arrived to this unit scant, drowsy, and lethargic  Pt reports struggling with depression and having an increase in intrusive thoughts to harm herself  Pt described her situation as feeling like "a residential, unable to get out of bed or stay away because of [my] depression"  Pt reports having AVH in hospital after OD, pt was experiencing "vivid scenes of children running through walls"  Never HI  Poor insight  Reports drinking on the weekends, roughly two bottles of wine at a time  Having passive SI at this time, but is agreeable to remain safe on the unit and seek staff if thoughts worsen

## 2022-10-07 NOTE — DISCHARGE SUMMARY
MEDICAL DETOX UNIT, LEVEL 4  Department of Medical Toxicology  Reason for Admission/Principal Problem: overdose  Admitting provider: Stacy Boss MD   10/6/2022  1:47 PM       Discharging Physician / Practitioner: Bharathi Peng  PCP: No primary care provider on file  Admission Date:   Admission Orders (From admission, onward)     Ordered        10/06/22 1444  Inpatient Admission  Once            Signed and Held  ED TO DIFFERENT CAMPUS [facility] UNIT or INPATIENT MEDICAL UNIT to  Vidhya Orozco  (using Discharge Readmit Navigator) - Admit Patient to [address] [place] [address]  Once                      Discharge Date: 10/07/22    Medical Problems             Resolved Problems  Date Reviewed: 10/7/2022   None                 History of depression  Assessment & Plan  · Per chart review, patient's significant other arrived at bedside shortly after the patient's presentation to the hospital and reported that she has a history of depression and was hospitalized for mental health in the past (approximately 5 years ago) in the setting of suicidal statements  · On assessment Cecilia reports that she has been experiencing increased depression and has been feeling suicidal due to ongoing relationship issues and family stressors as well as concerns that she would lose her college scholarship (she is currently attending college online and pursuing a degree in behavioral sciences)  Dieter Robles reports that she feels safe at her home and states that her relationship is not abusive  She states that she feels "trapped" and that the work she does around the house (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, cleans, 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) "is never enough"  She reports that she was drinking 4 bottles of Smirnoff Ice and  took a bottle of Benadryl with the intent to die  At this time she desires to pursue inpatient psychiatric treatment for her ongoing symptoms of depression    · 201 for inpatient psychiatric admission has been signed and is in the patient's chart  · Patient is not safe for discharge at this time  · Patient will be maintained on 1:1 continual observation at this time due to suicide attempt  · Patient has been restarted on venlafaxine 150 mg daily for ongoing symptoms of depression  · Case management consultation to assist with disposition planning  · To inpatient psych  Suicide attempt [facility])  Assessment & Plan  · Presented to the [place] [address] ED yesterday 10/5/22 due to suicidal ideation and suicide attempt via Benadryl overdose (on an unknown quantity of Benadryl pills)  · Presented to the [facility] ED yesterday evening (around 9:30 PM) 10/5/22 confused, blankly staring into space, unable to follow commands  · Per chart review, the significant other's mother went to the patient's house and determined that there were a number of Benadryl (50 mg tablets) missing  · On assessment Brandi Hamlin reports that on 10/5 prior to ED presentation she was drinking 4 bottles of Smirnoff Ice and  took a bottle of Benadryl with the intent to die  · Reports that she has been experiencing increased depression and has been feeling suicidal due to ongoing relationship issues and family stressors as well as concerns that she would lose her college scholarship (she is currently attending college online and pursuing a degree in behavioral sciences)  [place] reports that she feels safe at her home and states that her relationship is not abusive  She states that she feels "trapped" and that the work she does around the house (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, cleans, 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) "is never enough"    · At this time she desires to pursue inpatient psychiatric treatment for her ongoing symptoms of depression  · 201 for inpatient psychiatric admission has been signed and is in the patient's chart  · Patient is not safe for discharge at this time  · Patient will be maintained on 1:1 continual observation at this time due to suicide attempt  · Patient has been restarted on venlafaxine 150 mg daily for ongoing symptoms of depression  · Case management consultation to assist with disposition planning  · Patient has been medically cleared, stable for transfer to inpatient psychiatry    * Intentional diphenhydramine overdose [facility])  Assessment & Plan  · Presented to the [facility] ED 10/5/22 due to suicidal ideation and suicide attempt via Benadryl overdose (on an unknown quantity of Benadryl pills)  · Presented to the [facility] ED the evening (around 9:30 PM) 10/5/22 confused, blankly staring into space, unable to follow commands  · Per chart review, the significant other's mother went to the patient's house and determined that there were a number of Benadryl (50 mg tablets) missing  · Patient was assessed in the ED and found to have signs of anticholingeric toxicity  She was found to be confused, tachycardic, dry  EKG obtained in the ED 10/5 showed sinus tachycardia without ischemic changes  Patient was started on IV fluids and admitted to the [address] [place] [address] for further management  In the hospital the patient was continued on IV fluids (normal saline) and had a patel placed for urinary retention  She received three administrations of ativan 1 mg IM overnight 10/5 to 10/6 and was placed into restraints overnight as she continued to try and get out of bed and tried to pull out her patel  · Patient was admitted to the detox unit on 10/6/22 and supportive care was provided  She was maintained on a one-to-one continue observation due to suicide attempt  She was monitored on telemetry due to drug overdose known to cause arrhythmia  She was treated with multivitamins and electrolyte supplementation  She was maintained on IV fluids (D5 normal saline)   On the detox unit the patient's tachycardia improved with supportive care and she has not required Ativan administration for tachycardia or for agitation  · On assessment Cecilia reports that prior to ED presentation she was drinking 4 bottles of Smirnoff Ice and  took a bottle of Benadryl with the intent to die  · Continue supportive care, including multivitamin, IV fluids (maintained on D5 normal saline at 100 ml/hr), and bowel regimen (colace 100 mg BID and miralax 17 g daily for constipation)  · At this time discontinue telemetry  · Patient will be maintained on 1:1 continual observation at this time due to suicide attempt  · Patient able to void spontaneously after patel removal   Anticholinergic toxidrome resolved  Consultations During Hospital Stay:  · Psychiatry    Procedures Performed:   · See above    Significant Findings / Test Results:   · See above    Incidental Findings:   · None     Test Results Pending at Discharge (will require follow up):   · No     Outpatient Tests Requested:  · No    Complications:  Urinary retention requiring patel placement    Reason for Admission: No    Hospital Course:     Barbara Yanes is a 28 y o  female patient who originally presented to the hospital on 10/6/2022 due to suicidal ideation and intentional overdose of disphenhydramine  PMH significant for depression  Patient was brought in by  for AMS  Patient reportedly texted her  earlier in the day to notify him that she was suicidal   Around 9:30 PM, family noted that via texts and in person, patient was becoming increasingly confused  On arrival to the ED via EMS, patient was noted to be tachycardic, altered, with dry mucous membranes, mydriasis  Patient was treated with benzodiazepines and IV fluids for suspected anticholinergic toxidrome and was transferred to the detox unit after patient remained with persistent toxicity the next morning  Patient had episode of urinary retention, requiring patel placement    As mentation improved, patient admitted to drinking ETOH and a bottle of Benadryl in an attempt to harm herself  During admission to unit, there were no acute events  On day of discharge, anticholinergic toxidrome had fully resolved  Patient was able to void after removal of catheter  Transferred to [facility]  The patient, initially admitted to the hospital as inpatient, was discharged earlier than expected given the following: medical clearance   Please see above list of diagnoses and related plan for additional information  Condition at Discharge: good     Discharge Day Visit / Exam:     Subjective:  No complaints    Vitals: Blood Pressure: 107/68 (10/07/22 1523)  Pulse: 94 (10/07/22 1523)  Temperature: 98 6 °F (37 °C) (10/07/22 1523)  Temp Source: Temporal (10/07/22 1523)  Respirations: 20 (10/07/22 1523)  Height: 5' 2" (157 5 cm) (10/06/22 1345)  Weight - Scale: 92 3 kg (203 lb 8 oz) (10/06/22 1345)  SpO2: 97 % (10/07/22 1523)  Exam:   See progress note  Discussion with Family: none    Discharge instructions/Information to patient and family:   See after visit summary for information provided to patient and family  Provisions for Follow-Up Care:  See after visit summary for information related to follow-up care and any pertinent home health orders  Disposition:     Inpatient Psychiatry at [address] [place] [address] to [facility] SNF:   · Not Applicable to this Patient - Not Applicable to this Patient    Planned Readmission: No     Discharge Statement:  I spent 35 minutes discharging the patient  This time was spent on the day of discharge  I had direct contact with the patient on the day of discharge  Greater than 50% of the total time was spent examining patient, answering all patient questions, arranging and discussing plan of care with patient as well as directly providing post-discharge instructions  Additional time then spent on discharge activities      Discharge Medications:  See after visit summary for reconciled discharge medications provided to patient and family        ** Please Note: This note has been constructed using a voice recognition system **

## 2022-10-07 NOTE — UTILIZATION REVIEW
Initial Clinical Review    Admission: Date/Time/Statement:   Admission Orders (From admission, onward)     Ordered        10/06/22 1444  Inpatient Admission  Once                      Orders Placed This Encounter   Procedures    Inpatient Admission     Standing Status:   Standing     Number of Occurrences:   1     Order Specific Question:   Level of Care     Answer:   Med Surg [16]     Order Specific Question:   Estimated length of stay     Answer:   More than 2 Midnights     Order Specific Question:   Certification     Answer:   I certify that inpatient services are medically necessary for this patient for a duration of greater than two midnights  See H&P and MD Progress Notes for additional information about the patient's course of treatment  Initial Presentation: 28 y o  female directly admitted from [address] [place] [address] to [address] [place] [address] detox unit for diphenhydramine overdose  H/o depression  Upon arrival to the ED, she was confused, tachycardic, dry  Started on IV fluids  Upon arrival to [facility] heart, she remains confused, tachycardic  Requiring intermittent dosing of ATivan  Unable to be re directed  At home has been having increased stressors  She took unknown quantity of diphenhydramine with alcohol  Monitor telemetry closely as benadryl od could cause arrhythmia  1:1 observation  Continue with iv fluids  Date: 10/7   Day 3  20-year-old female admitted for anticholinergic toxidrome and alcohol use  Patient did not require stabilization for alcohol withdrawal Anticholinergic toxidrome resolved  Calm, cooperative  Voluntary commitment  Behavioral  working on IP placement   Continue 1:1     ED Triage Vitals   Temperature Pulse Respirations Blood Pressure SpO2   10/06/22 1345 10/06/22 1345 10/06/22 1345 10/06/22 1345 10/06/22 1345   100 4 °F (38 °C) (!) 109 18 119/74 98 %      Temp Source Heart Rate Source Patient Position - Orthostatic VS BP Location FiO2 (%) 10/06/22 1345 10/06/22 1900 10/06/22 1345 10/06/22 1345 --   Temporal Monitor Lying Left arm       Pain Score       10/06/22 1446       No Pain          Wt Readings from Last 1 Encounters:   10/06/22 92 3 kg (203 lb 8 oz)     Additional Vital Signs:   Time Temp Pulse Resp BP MAP (mmHg) SpO2 O2 Device Patient Position - Orthostatic VS   10/07/22 0748 98 2 °F (36 8 °C) 95 20 114/71 85 95 % None (Room air) Lying   10/07/22 0339 -- 102 20 116/77 -- 100 % None (Room air) Sitting   10/06/22 1900 99 °F (37 2 °C) 96 18 150/72 98 100 % None (Room air) Lying   10/06/22 1345 100 4 °F (38 °C) 109 Abnormal  18 119/74 -- 98 % None (Room air) Lying       Pertinent Labs/Diagnostic Test Results:   10/5 EKG:Sinus tachycardia  Otherwise normal ECG  When compared with ECG of 05-OCT-2022 20:43, (unconfirmed)  Nonspecific T wave abnormality no longer evident in Lateral leads      Results from last 7 days   Lab Units 10/07/22  0456 10/06/22  0540 10/05/22  2043   WBC Thousand/uL 8 67 11 67* 8 59   HEMOGLOBIN g/dL 11 4* 11 6 12 2   HEMATOCRIT % 35 3 36 8 38 0   PLATELETS Thousands/uL 327 361 339   NEUTROS ABS Thousands/µL  --  8 99* 5 62         Results from last 7 days   Lab Units 10/07/22  0456 10/06/22  0540 10/05/22  2043   SODIUM mmol/L 138 141 143   POTASSIUM mmol/L 3 2* 3 9 3 3*   CHLORIDE mmol/L 108 106 107   CO2 mmol/L 22 21 21   ANION GAP mmol/L 8 14* 15*   BUN mg/dL 7 8 8   CREATININE mg/dL 0 70 0 69 0 83   EGFR ml/min/1 73sq m 115 115 93   CALCIUM mg/dL 8 2* 9 0 9 6   MAGNESIUM mg/dL 1 9  --  2 3   PHOSPHORUS mg/dL 2 6  --   --      Results from last 7 days   Lab Units 10/07/22  0456 10/06/22  0540 10/05/22  2043   AST U/L 36 17 13   ALT U/L 25 20 27   ALK PHOS U/L 76 89 102   TOTAL PROTEIN g/dL 6 6 8 1 8 5*   ALBUMIN g/dL 3 5 3 8 4 2   TOTAL BILIRUBIN mg/dL 0 33 0 22 0 20         Results from last 7 days   Lab Units 10/07/22  0456 10/06/22  0540 10/05/22  2043   GLUCOSE RANDOM mg/dL 135* 62* 71       Results from last 7 days Lab Units 10/05/22  2131   PROTIME seconds 13 9   INR  1 07   PTT seconds 26       Results from last 7 days   Lab Units 10/05/22  2227   AMPH/METH  Negative   BARBITURATE UR  Negative   BENZODIAZEPINE UR  Negative   COCAINE UR  Negative   METHADONE URINE  Negative   OPIATE UR  Negative   PCP UR  Negative   THC UR  Negative     Results from last 7 days   Lab Units 10/05/22  2043   ETHANOL LVL mg/dL 29*   ACETAMINOPHEN LVL ug/mL <2*   SALICYLATE LVL mg/dL <3*         History reviewed  No pertinent past medical history  Present on Admission:   Intentional diphenhydramine overdose ([facility] )   Suicide attempt [facility])      Admitting Diagnosis: Suicide attempt ([facility] ) Louann Sep  Age/Sex: 28 y o  female  Admission Orders:  Scheduled Medications:  enoxaparin, 40 mg, Subcutaneous, Daily  multivitamin-minerals, 1 tablet, Oral, Daily  polyethylene glycol, 17 g, Oral, Once      Continuous IV Infusions:  dextrose 5 % and sodium chloride 0 9 %, 100 mL/hr, Intravenous, Continuous      PRN Meds:  acetaminophen, 650 mg, Oral, Q4H PRN  aluminum-magnesium hydroxide-simethicone, 30 mL, Oral, Q6H PRN  cloNIDine, 0 1 mg, Oral, Q6H PRN  docusate sodium, 100 mg, Oral, BID PRN  LORazepam, 2 mg, Intravenous, Q4H PRN  ondansetron, 4 mg, Intravenous, Q6H PRN  traZODone, 50 mg, Oral, HS PRN        IP CONSULT TO CASE MANAGEMENT    Network Utilization Review Department  ATTENTION: Please call with any questions or concerns to [phone] and carefully listen to the prompts so that you are directed to the right person  All voicemails are confidential   [facility] all requests for admission clinical reviews, approved or denied determinations and any other requests to dedicated fax number below belonging to the campus where the patient is receiving treatment   List of dedicated fax numbers for the Facilities:  FACILITY NAME UR FAX NUMBER   ADMISSION DENIALS (Administrative/Medical Necessity) [phone]   [address] (Maternity/NICU/Pediatrics) [facility],7Th Floor [address]  [phone]   [address] [id number] [place] [address] [address] [facility] [place] [address] [phone]

## 2022-10-07 NOTE — ASSESSMENT & PLAN NOTE
· Presented to the [facility]  ED 10/5/22 due to suicidal ideation and suicide attempt via Benadryl overdose (on an unknown quantity of Benadryl pills)  · Presented to the [facility] ED the evening (around 9:30 PM) 10/5/22 confused, blankly staring into space, unable to follow commands  · Per chart review, the significant other's mother went to the patient's house and determined that there were a number of Benadryl (50 mg tablets) missing  · Patient was assessed in the ED and found to have signs of anticholingeric toxicity  She was found to be confused, tachycardic, dry  EKG obtained in the ED 10/5 showed sinus tachycardia without ischemic changes  Patient was started on IV fluids and admitted to the [address] [place] [address] for further management  In the hospital the patient was continued on IV fluids (normal saline) and had a patel placed for urinary retention  She received three administrations of ativan 1 mg IM overnight 10/5 to 10/6 and was placed into restraints overnight as she continued to try and get out of bed and tried to pull out her patel  · Patient was admitted to the detox unit on 10/6/22 and supportive care was provided  She was maintained on a one-to-one continue observation due to suicide attempt  She was monitored on telemetry due to drug overdose known to cause arrhythmia  She was treated with multivitamins and electrolyte supplementation  She was maintained on IV fluids (D5 normal saline)  On the detox unit the patient's tachycardia improved with supportive care and she has not required Ativan administration for tachycardia or for agitation     · On assessment Cecilia reports that prior to ED presentation she was drinking 4 bottles of Smirnoff Ice and  took a bottle of Benadryl with the intent to die  · Continue supportive care, including multivitamin, IV fluids (maintained on D5 normal saline at 100 ml/hr), and bowel regimen (colace 100 mg BID and miralax 17 g daily for constipation)  · At this time discontinue telemetry  · Patient will be maintained on 1:1 continual observation at this time due to suicide attempt  · Patient able to void spontaneously after patel removal   Anticholinergic toxidrome resolved

## 2022-10-07 NOTE — PROGRESS NOTES
[facility]  Progress Note Summer Lee [address] 1990, 28 y o  female MRN: [id number]  Unit/Bed#: 5T DETOX 501-01 Encounter: [id number]  Primary Care Provider: No primary care provider on file  Date and time admitted to hospital: 10/6/2022  1:47 PM    MEDICAL DETOX UNIT, LEVEL 4  Department of Medical Toxicology  Reason for Admission/Principal Problem:  Benadryl Overdose  Rounding Provider: Celeste Nissen, Kennedy Martinez MD     * Intentional diphenhydramine overdose [facility])  Assessment & Plan  · Presented to the [place] [address] ED 10/5/22 due to suicidal ideation and suicide attempt via Benadryl overdose (on an unknown quantity of Benadryl pills)  · Presented to the [facility] ED the evening (around 9:30 PM) 10/5/22 confused, blankly staring into space, unable to follow commands  · Per chart review, the significant other's mother went to the patient's house and determined that there were a number of Benadryl (50 mg tablets) missing  · Patient was assessed in the ED and found to have signs of anticholingeric toxicity  She was found to be confused, tachycardic, dry  EKG obtained in the ED 10/5 showed sinus tachycardia without ischemic changes  Patient was started on IV fluids and admitted to the [address] [place] [address] for further management  In the hospital the patient was continued on IV fluids (normal saline) and had a patel placed for urinary retention  She received three administrations of ativan 1 mg IM overnight 10/5 to 10/6 and was placed into restraints overnight as she continued to try and get out of bed and tried to pull out her patel  · Patient was admitted to the detox unit on 10/6/22 and supportive care was provided  She was maintained on a one-to-one continue observation due to suicide attempt  She was monitored on telemetry due to drug overdose known to cause arrhythmia  She was treated with multivitamins and electrolyte supplementation  She was maintained on IV fluids (D5 normal saline)  On the detox unit the patient's tachycardia improved with supportive care and she has not required Ativan administration for tachycardia or for agitation  · On assessment Cecilia reports that prior to ED presentation she was drinking 4 bottles of Smirnoff Ice and  took a bottle of Benadryl with the intent to die  · Continue supportive care, including multivitamin, IV fluids (maintained on D5 normal saline at 100 ml/hr), and bowel regimen (colace 100 mg BID and miralax 17 g daily for constipation)  · At this time discontinue telemetry  · Patient will be maintained on 1:1 continual observation at this time due to suicide attempt  · Rodriguez will be removed and a voiding trial will be performed to ensure that she is able to urinate without difficulty  At this time will continue to encourage oral intake  Tentatively plan to medically clear the patient later today for inpatient psychiatry provided has adequate oral intake and is able to use the bathroom without difficulty  History of depression  Assessment & Plan  · Per chart review, patient's significant other arrived at bedside shortly after the patient's presentation to the hospital and reported that she has a history of depression and was hospitalized for mental health in the past (approximately 5 years ago) in the setting of suicidal statements  · On assessment Cecilia reports that she has been experiencing increased depression and has been feeling suicidal due to ongoing relationship issues and family stressors as well as concerns that she would lose her college scholarship (she is currently attending college online and pursuing a degree in behavioral sciences)  Judith Justin reports that she feels safe at her home and states that her relationship is not abusive   She states that she feels "trapped" and that the work she does around the house (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, cleans, 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) "is never enough"  She reports that she was drinking 4 bottles of Smirnoff Ice and  took a bottle of Benadryl with the intent to die  At this time she desires to pursue inpatient psychiatric treatment for her ongoing symptoms of depression  · 201 for inpatient psychiatric admission has been signed and is in the patient's chart  · Patient is not safe for discharge at this time  · Patient will be maintained on 1:1 continual observation at this time due to suicide attempt  · Patient has been restarted on venlafaxine 150 mg daily for ongoing symptoms of depression  · Case management consultation to assist with disposition planning  · Tentatively plan to medically clear the patient later today for inpatient psychiatry provided has adequate oral intake and is able to use the bathroom without difficulty  Suicide attempt [facility])  Assessment & Plan  · Presented to the [place] [address] ED yesterday 10/5/22 due to suicidal ideation and suicide attempt via Benadryl overdose (on an unknown quantity of Benadryl pills)  · Presented to the [facility] ED yesterday evening (around 9:30 PM) 10/5/22 confused, blankly staring into space, unable to follow commands  · Per chart review, the significant other's mother went to the patient's house and determined that there were a number of Benadryl (50 mg tablets) missing  · On assessment Shine Whitten reports that on 10/5 prior to ED presentation she was drinking 4 bottles of Smirnoff Ice and  took a bottle of Benadryl with the intent to die  · Reports that she has been experiencing increased depression and has been feeling suicidal due to ongoing relationship issues and family stressors as well as concerns that she would lose her college scholarship (she is currently attending college online and pursuing a degree in behavioral sciences)  Shine Whitten reports that she feels safe at her home and states that her relationship is not abusive   She states that she feels "trapped" and that the work she does around the house (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, cleans, 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) "is never enough"  · At this time she desires to pursue inpatient psychiatric treatment for her ongoing symptoms of depression  · 201 for inpatient psychiatric admission has been signed and is in the patient's chart  · Patient is not safe for discharge at this time  · Patient will be maintained on 1:1 continual observation at this time due to suicide attempt  · Patient has been restarted on venlafaxine 150 mg daily for ongoing symptoms of depression  · Case management consultation to assist with disposition planning  · Tentatively plan to medically clear the patient later today for inpatient psychiatry provided has adequate oral intake and is able to use the bathroom without difficulty  VTE Pharmacologic Prophylaxis:   Pharmacologic: Enoxaparin (Lovenox)  Mechanical VTE Prophylaxis in Place: yes    Code Status: Level 1 - Full Code    Patient Centered Rounds: I have performed bedside rounds with nursing staff today  Discussions with Specialists or Other Care Team Provider: Case Management   Education and Discussions with Family / Patient:  I discussed with the patient the plan for ongoing monitoring and symptomatic care  I discussed with the patient at this time her Rodriguez will be removed and a voiding trial will be performed to ensure that she is able to urinate without difficulty  At this time will continue to encourage oral intake  Tentatively plan to medically clear the patient later today for inpatient psychiatry provided has adequate oral intake and is able to use the bathroom without difficulty  Time Spent for Care: 30 minutes  More than 50% of total time spent on counseling and coordination of care as described above      Current Length of Stay: 1 day(s)    Current Patient Status: Inpatient     Certification Statement: The patient will continue to require additional inpatient hospital stay due to benadryl overdose and suicidal ideation Discharge Plan: Tentatively plan to medically clear the patient later today for inpatient psychiatry provided has adequate oral intake and is able to use the bathroom without difficulty  Patient requires inpatient psychiatric treatment due to suicidal ideation and suicide attempt  Total time spent today 30 minutes  Greater than 50% of total time was spent with the patient and / or family counseling and / or coordination of care  A description of the counseling / coordination of care: I discussed with the patient the plan for ongoing monitoring and symptomatic care  I discussed with the patient at this time her Rodriguez will be removed and a voiding trial will be performed to ensure that she is able to urinate without difficulty  At this time will continue to encourage oral intake  Tentatively plan to medically clear the patient later today for inpatient psychiatry provided has adequate oral intake and is able to use the bathroom without difficulty  Subjective: This is a progress note for Mirna Centeno, a 25-year-old  female with no significant past medical history and a past psychiatric history of unspecified depression who presented to the [facility] ED 10/5/22 due to suicidal ideation and suicide attempt via Benadryl overdose (on an unknown quantity of Benadryl pills)    Patient was admitted to the detox unit on 10/6/22 and supportive care was provided  She was maintained on a one-to-one continue observation due to suicide attempt  She was monitored on telemetry due to drug overdose known to cause arrhythmia  She was treated with multivitamins and electrolyte supplementation  She was maintained on IV fluids (D5 normal saline)      On the detox unit the patient's tachycardia improved with supportive care and she has not required Ativan administration for tachycardia or for agitation  Patient was seen and examined this morning  On assessment, Nohemi Haywood is noted to be blunted in affect, soft and scant in [facility], [address] reports that she has been steadily improving the hospital, but she continues to feel tired and fatigued  She states that she continues to have decreased appetite, but was able to eat some chicken fingers overnight  She continues to experience ongoing symptoms of dry mouth and reports that she is feeling constipated today  On assessment the patient denies fevers, chills, chest pain, shortness of breath, nausea, vomiting  She reports that she is no longer feeling tremulous the denies any issues with balance or coordination  Nohemi Haywood continues to report suicidal ideation  When asked if she will be able to keep herself safe in the hospital, she she responds by saying Stefano Mei        Objective:     Clinical Opiate Withdrawal Scale  Pulse: 95    SEWS Total Score: 3 (10/6/2022  3:15 PM)        Last 24 Hours Medication List:   Current Facility-Administered Medications   Medication Dose Route Frequency Provider Last Rate    acetaminophen  650 mg Oral Q4H PRN Adrian Xavier MD      aluminum-magnesium hydroxide-simethicone  30 mL Oral Q6H PRN Adrian Xavier MD      cloNIDine  0 1 mg Oral Q6H PRN Adrian Xavier MD      dextrose 5 % and sodium chloride 0 9 %  100 mL/hr Intravenous Continuous Ladene Pancoast Nappe,  mL/hr (10/07/22 0151)    docusate sodium  100 mg Oral BID PRN Aretha Kayser, MD      enoxaparin  40 mg Subcutaneous Daily Aretha Kayser, MD      LORazepam  2 mg Intravenous Q4H PRN Shawna Shelton PA-C      multivitamin-minerals  1 tablet Oral Daily Aretha Kayser, MD      ondansetron  4 mg Intravenous Q6H PRN Aretha Kayser, MD      traZODone  50 mg Oral HS PRN Aretha Kayser, MD           Vitals:   Temp (24hrs), Av 3 °F (37 4 °C), Min:98 2 °F (36 8 °C), Max:100 4 °F (38 °C)    Temp:  [98 2 °F (36 8 °C)-100 4 °F (38 °C)] 98 2 °F (36 8 °C)  HR:  [] 95  Resp:  [18-20] 20  BP: (114-150)/(71-80) 114/71  SpO2:  [95 %-100 %] 95 %  Body mass index is 37 22 kg/m²  Input and Output Summary (last 24 hours): Intake/Output Summary (Last 24 hours) at 10/7/2022 0940  Last data filed at 10/7/2022 0601  Gross per 24 hour   Intake 950 ml   Output 750 ml   Net 200 ml       Physical Exam:   Physical Exam  Vitals and nursing note reviewed  Constitutional:       General: She is not in acute distress  Appearance: She is not ill-appearing or diaphoretic  Comments: BMI > 37   HENT:      Head: Normocephalic and atraumatic  Nose: No congestion or rhinorrhea  Mouth/Throat:      Mouth: Mucous membranes are moist       Pharynx: Oropharynx is clear  No oropharyngeal exudate or posterior oropharyngeal erythema  Eyes:      General: No scleral icterus  Extraocular Movements: Extraocular movements intact  Conjunctiva/sclera: Conjunctivae normal       Pupils: Pupils are equal, round, and reactive to light  Cardiovascular:      Rate and Rhythm: Normal rate and regular rhythm  Heart sounds: Normal heart sounds  No murmur heard  Pulmonary:      Effort: Pulmonary effort is normal       Breath sounds: Normal breath sounds  No wheezing  Abdominal:      General: Bowel sounds are normal  There is no distension  Palpations: Abdomen is soft  Tenderness: There is no abdominal tenderness  Musculoskeletal:      Right lower leg: No edema  Left lower leg: No edema  Skin:     General: Skin is warm  Capillary Refill: Capillary refill takes less than 2 seconds  Neurological:      Mental Status: She is alert and oriented to person, place, and time  Mental status is at baseline  Cranial Nerves: No cranial nerve deficit, dysarthria or facial asymmetry  Motor: No pronator drift        Coordination: Coordination normal  Finger-Nose-Finger Test normal    Psychiatric:         Attention and Perception: She does not perceive auditory or visual hallucinations  Mood and Affect: Mood is depressed  Affect is blunt  Speech: Speech is delayed  Behavior: Behavior is withdrawn  Thought Content: Thought content includes suicidal ideation  Comments: Speech is soft and scant       Additional Data:     Labs: keep all most recent labs as listed on admission templates   Results from last 7 days   Lab Units 10/07/22  0456 10/06/22  0540   WBC Thousand/uL 8 67 11 67*   HEMOGLOBIN g/dL 11 4* 11 6   HEMATOCRIT % 35 3 36 8   PLATELETS Thousands/uL 327 361   NEUTROS PCT %  --  78*   LYMPHS PCT %  --  16   MONOS PCT %  --  6   EOS PCT %  --  0      Results from last 7 days   Lab Units 10/07/22  0456   SODIUM mmol/L 138   POTASSIUM mmol/L 3 2*   CHLORIDE mmol/L 108   CO2 mmol/L 22   BUN mg/dL 7   CREATININE mg/dL 0 70   ANION GAP mmol/L 8   CALCIUM mg/dL 8 2*   ALBUMIN g/dL 3 5   TOTAL BILIRUBIN mg/dL 0 33   ALK PHOS U/L 76   ALT U/L 25   AST U/L 36   GLUCOSE RANDOM mg/dL 135*      Results from last 7 days   Lab Units 10/05/22  2131   INR  1 07                         * I Have Reviewed All Lab Data Listed Above  * Additional Pertinent Lab Tests Reviewed: All Labs Within Last 24 Hours Reviewed      Imaging Studies:  CT head without contrast 10/05/2022 showed no acute intracranial abnormality  No intracranial mass, mass effect, or midline shift  Recent Cultures (last 7 days): Today, Patient Was Seen By: Reginald Sacks    ** Please Note: Dictation voice to text software may have been used in the creation of this document   **

## 2022-10-07 NOTE — CASE MANAGEMENT
CM contacted [employer] to complete prior authorization; spoke with Andres Granados and provided demographics  CM spoke with Julissa Delaney and provided clinicals; pt approved for 5 days for inpatient behavioral health treatment, accepting facility needs to contact [facility] [employer] to acquire authorization number  CM contacted [facility] to arrange CTS transportation; [facility] will follow up with CM to provide  time  (4) no limitation

## 2022-10-07 NOTE — ASSESSMENT & PLAN NOTE
· Per chart review, patient's significant other arrived at bedside shortly after the patient's presentation to the hospital and reported that she has a history of depression and was hospitalized for mental health in the past (approximately 5 years ago) in the setting of suicidal statements  · On assessment Cecilia reports that she has been experiencing increased depression and has been feeling suicidal due to ongoing relationship issues and family stressors as well as concerns that she would lose her college scholarship (she is currently attending college online and pursuing a degree in behavioral sciences)  Selena Mac reports that she feels safe at her home and states that her relationship is not abusive  She states that she feels "trapped" and that the work she does around the house (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, cleans, 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) "is never enough"  She reports that she was drinking 4 bottles of Smirnoff Ice and  took a bottle of Benadryl with the intent to die  At this time she desires to pursue inpatient psychiatric treatment for her ongoing symptoms of depression  · 201 for inpatient psychiatric admission has been signed and is in the patient's chart  · Patient is not safe for discharge at this time  · Patient will be maintained on 1:1 continual observation at this time due to suicide attempt  · Patient has been restarted on venlafaxine 150 mg daily for ongoing symptoms of depression  · Case management consultation to assist with disposition planning  · To inpatient psych

## 2022-10-07 NOTE — PLAN OF CARE
Problem: Prexisting or High Potential for Compromised Skin Integrity  Goal: Skin integrity is maintained or improved  Description: INTERVENTIONS:  - Identify patients at risk for skin breakdown  - Assess and monitor skin integrity  - Assess and monitor nutrition and hydration status  - Monitor labs   - Assess for incontinence   - Turn and reposition patient  - Assist with mobility/ambulation  - Relieve pressure over bony prominences  - Avoid friction and shearing  - Provide appropriate hygiene as needed including keeping skin clean and dry  - Evaluate need for skin moisturizer/barrier cream  - Collaborate with interdisciplinary team   - Patient/family teaching  - Consider wound care consult   Outcome: Progressing     Problem: MOBILITY - ADULT  Goal: Maintain or return to baseline ADL function  Description: INTERVENTIONS:  -  Assess patient's ability to carry out ADLs; assess patient's baseline for ADL function and identify physical deficits which impact ability to perform ADLs (bathing, care of mouth/teeth, toileting, grooming, dressing, etc )  - Assess/evaluate cause of self-care deficits   - Assess range of motion  - Assess patient's mobility; develop plan if impaired  - Assess patient's need for assistive devices and provide as appropriate  - Encourage maximum independence but intervene and supervise when necessary  - Involve family in performance of ADLs  - Assess for home care needs following discharge   - Consider OT consult to assist with ADL evaluation and planning for discharge  - Provide patient education as appropriate  Outcome: Progressing  Goal: Maintains/Returns to pre admission functional level  Description: INTERVENTIONS:  - Perform BMAT or MOVE assessment daily    - Set and communicate daily mobility goal to care team and patient/family/caregiver     - Collaborate with rehabilitation services on mobility goals if consulted  - Out of bed for toileting  - Record patient progress and toleration of activity level   Outcome: Progressing     Problem: SUBSTANCE USE/ABUSE  Goal: By discharge, will develop insight into their chemical dependency and sustain motivation to continue in recovery  Description: INTERVENTIONS:  - Attends all daily group sessions and scheduled AA groups  - Actively practices coping skills through participation in the therapeutic community and adherence to program rules  - Reviews and completes assignments from individual treatment plan  - Assist patient development of understanding of their personal cycle of addiction and relapse triggers  Outcome: Progressing  Goal: By discharge, patient will have ongoing treatment plan addressing chemical dependency  Description: INTERVENTIONS:  - Assist patient with resources and/or appointments for ongoing recovery based living  Outcome: Progressing     Problem: COPING  Goal: Pt/Family able to verbalize concerns and demonstrate effective coping strategies  Description: INTERVENTIONS:  - Assist patient/family to identify coping skills, available support systems and cultural and spiritual values  - Provide emotional support, including active listening and acknowledgement of concerns of patient and caregivers  - Reduce environmental stimuli, as able  - Provide patient education  - Assess for spiritual pain/suffering and initiate spiritual care, including notification of Pastoral Care or ravinder based community as needed  - Assess effectiveness of coping strategies  Outcome: Progressing  Goal: Will report anxiety at manageable levels  Description: INTERVENTIONS:  - Administer medication as ordered  - Teach and encourage coping skills  - Provide emotional support  - Assess patient/family for anxiety and ability to cope  Outcome: Progressing     Problem: DECISION MAKING  Goal: Pt/Family able to effectively weigh alternatives and participate in decision making related to treatment and care  Description: INTERVENTIONS:  - Identify decision maker  - Determine when there are differences among patient's view, family's view, and healthcare provider's view of patient condition and care goals  - Facilitate patient/family articulation of goals for care  - Help patient/family identify pros/cons of alternative solutions  - Provide information as requested by patient/family  - Respect patient/family rights related to privacy and sharing information   - Serve as a liaison between patient, family and health care team  - Initiate consults as appropriate (Ethics Team, Palliative Care, Family Care Conference, etc )  Outcome: Progressing     Problem: Depression - IP adult  Goal: Effects of depression will be minimized  Description: INTERVENTIONS:  - Assess impact of patient's symptoms on level of functioning, self-care needs and offer support as indicated  - Assess patient/family knowledge of depression, impact on illness and need for teaching  - Provide emotional support, presence and reassurance  - Assess for possible suicidal thoughts, ideation or self-harm  If patient expresses suicidal thoughts or statements do not leave alone, notify physician/AP immediately, initiate Suicide Precautions, and determine need for continual observation   - Initiate consults and referrals as appropriate (a mental health professional, Spiritual Care)  - Administer medication as ordered  Outcome: Progressing     Problem: SELF HARM  Goal: Effect of psychiatric condition will be minimized and patient will be protected from self harm  Description: INTERVENTIONS:  - Assess impact of patient's symptoms on level of functioning, self-care needs and offer support as indicated  - Assess patient/family knowledge of depression, impact on illness and need for teaching  - Provide emotional support, presence and reassurance  - Assess for possible suicidal thoughts, ideation or self-harm   If patient expresses suicidal thoughts or statements do not leave alone, notify physician/AP immediately, initiate suicide precautions, and determine need for continual observation    - initiate consults and referrals as appropriate (a mental health professional, Spiritual Care  Outcome: Progressing     Problem: Potential for Falls  Goal: Patient will remain free of falls  Description: INTERVENTIONS:  - Educate patient/family on patient safety including physical limitations  - Instruct patient to call for assistance with activity   - Consult OT/PT to assist with strengthening/mobility   - Keep Call bell within reach  - Keep bed low and locked with side rails adjusted as appropriate  - Keep care items and personal belongings within reach  - Initiate and maintain comfort rounds  - Make Fall Risk Sign visible to staff  - Apply yellow socks and bracelet for high fall risk patients  - Consider moving patient to room near nurses station  Outcome: Progressing

## 2022-10-08 PROBLEM — Z00.8 MEDICAL CLEARANCE FOR PSYCHIATRIC ADMISSION: Status: ACTIVE | Noted: 2022-10-08

## 2022-10-08 LAB — HCG SERPL QL: NEGATIVE

## 2022-10-08 PROCEDURE — 99221 1ST HOSP IP/OBS SF/LOW 40: CPT | Performed by: PSYCHIATRY & NEUROLOGY

## 2022-10-08 PROCEDURE — 99253 IP/OBS CNSLTJ NEW/EST LOW 45: CPT

## 2022-10-08 PROCEDURE — 86592 SYPHILIS TEST NON-TREP QUAL: CPT | Performed by: PHYSICIAN ASSISTANT

## 2022-10-08 PROCEDURE — 84703 CHORIONIC GONADOTROPIN ASSAY: CPT | Performed by: PHYSICIAN ASSISTANT

## 2022-10-08 PROCEDURE — 93005 ELECTROCARDIOGRAM TRACING: CPT

## 2022-10-08 RX ORDER — POLYETHYLENE GLYCOL 3350 17 G/17G
17 POWDER, FOR SOLUTION ORAL DAILY
Status: DISCONTINUED | OUTPATIENT
Start: 2022-10-08 | End: 2022-10-12 | Stop reason: HOSPADM

## 2022-10-08 RX ORDER — AMOXICILLIN 250 MG
1 CAPSULE ORAL
Status: DISCONTINUED | OUTPATIENT
Start: 2022-10-08 | End: 2022-10-12 | Stop reason: HOSPADM

## 2022-10-08 RX ADMIN — VENLAFAXINE HYDROCHLORIDE 150 MG: 150 CAPSULE, EXTENDED RELEASE ORAL at 09:17

## 2022-10-08 RX ADMIN — SENNOSIDES AND DOCUSATE SODIUM 1 TABLET: 50; 8.6 TABLET ORAL at 21:44

## 2022-10-08 NOTE — NURSING NOTE
Sleeping much of morning, no breakfast  OOB for phone call from , returned to bed following  Did come out for lunch  Reports being tired and wanting to sleep  Denies SI "not right now", struggles with thoughts intermittently  Reports being able to approach staff if symptoms worsen  Denies AVH

## 2022-10-08 NOTE — TREATMENT PLAN
TREATMENT PLAN REVIEW - [address] Anjelica Marques RepolletGonzalez 28 y o  1990 female MRN: [id number]    [address] [place] [address] Room / Bed: [facility] 256/[facility] 336-70 Encounter: [id number]          Admit Date/Time:  10/7/2022  5:34 PM    Treatment Team: Attending Provider: Lucille Do MD; Security: Trea Ganesh; Security: Rogel Postin; Patient Care Technician: Romero Hernandez; Patient Care Assistant: Jae Anna;  Charge Nurse: Clayton Washington RN; Registered Nurse: Sana Rosa RN; Medications RN: Patel Mancuso RN    Diagnosis: Principal Problem:    Suicide attempt [facility])  Active Problems:    Medical clearance for psychiatric admission      Patient Strengths/Assets: patient is on a voluntary commitment    Patient Barriers/Limitations: poor support system    Short Term Goals: decrease in depressive symptoms, decrease in suicidal thoughts    Long Term Goals: improvement in depression, free of suicidal thoughts    Progress Towards Goals: continue psychiatric medications as prescribed    Recommended Treatment: medication management, patient medication education, group therapy, milieu therapy, continued Behavioral Health psychiatric evaluation/assessment process    Treatment Frequency: daily medication monitoring, group and milieu therapy daily, monitoring through interdisciplinary rounds, monitoring through weekly patient care conferences    Expected Discharge Date:  7 days    Discharge Plan: referrals as indicated    Treatment Plan Created/Updated By: Azael Montana MD

## 2022-10-08 NOTE — H&P
Psychiatric Evaluation - Behavioral Health   [facility], [place] Larry 28 y o  female MRN: [id number]  Unit/Bed#: [facility] 256-01 Encounter: [id number]    Assessment/Plan   Principal Problem:    Suicide attempt ([address] )    Plan:   1  effexor XR 150mg was restarted and patient agreeable to increase to 225mg daily  2  Collateral from family  3  T/c augmentation with remeron 7 5mg hs  4   Group and milieu therapy  Current Facility-Administered Medications   Medication Dose Route Frequency Provider Last Rate   • acetaminophen  650 mg Oral Q6H PRN Oddis Hence, PA-C     • acetaminophen  650 mg Oral Q4H PRN Oddis Hence, PA-C     • acetaminophen  975 mg Oral Q6H PRN Oddis Hence, PA-C     • aluminum-magnesium hydroxide-simethicone  30 mL Oral Q4H PRN Oddis Hence, PA-C     • artificial tear  1 application Both Eyes I7B PRN Oddis Hence, PA-C     • benztropine  1 mg Intramuscular Q4H PRN Max 6/day Oddis Hence, PA-C     • benztropine  1 mg Oral Q4H PRN Max 6/day Oddis Hence, PA-C     • bisacodyl  10 mg Rectal Daily PRN Oddis Hence, PA-C     • hydrOXYzine HCL  50 mg Oral Q6H PRN Max 4/day Oddis Hence, PA-C      Or   • diphenhydrAMINE  50 mg Intramuscular Q6H PRN Oddis Hence, PA-C     • hydrOXYzine HCL  100 mg Oral Q6H PRN Max 4/day Oddis Hence, PA-C      Or   • LORazepam  2 mg Intramuscular Q6H PRN Oddis Hence, PA-C     • hydrOXYzine HCL  25 mg Oral Q6H PRN Max 4/day Oddis Hence, PA-C     • melatonin  3 mg Oral HS PRN Oddis Hence, PA-C     • OLANZapine  5 mg Oral Q4H PRN Max 3/day Oddis Hence, PA-C      Or   • OLANZapine  2 5 mg Intramuscular Q4H PRN Max 3/day Oddis Hence, PA-C     • OLANZapine  5 mg Oral Q3H PRN Max 3/day Oddis Hence, PA-C      Or   • OLANZapine  5 mg Intramuscular Q3H PRN Max 3/day Oddis Hence, PA-C     • OLANZapine  2 5 mg Oral Q4H PRN Max 6/day Oddis Hence, PA-C     • polyethylene glycol  17 g Oral Daily PRN Oddis Hence, PA-C     • senna-docusate sodium  1 tablet Oral Daily PRN Patrickleigh Calhoun PA-C     • venlafaxine  150 mg Oral Daily Darnell Calhoun PA-C       Risks, benefits and possible side effects of Medications:   Risks, benefits, and possible side effects of medications explained to patient and patient verbalizes understanding  patient tolerated medication in past, will increase and monitor    Chief Complaint: "I wanted to die"    History of Present Illness     Patient is a 28 y o  female presents with Signs of suicidal potential   Patient was admitted to psychiatric unit on a voluntarily 201 commitment basis  Primary complaints include: depression worse and feeling suicidal   Onset of symptoms was gradual starting a few months ago with rapidly worsening course since that time  Psychosocial Stressors: family and social   She was admitted to the medical detox unit and subsequently transferred to [facility] for further management of depression  Per detox note "On assessment Em Amadeo reports that she has been experiencing increased depression and has been feeling suicidal due to ongoing relationship issues and family stressors as well as concerns that she would lose her college scholarship (she is currently attending college online and pursuing a degree in behavioral sciences)  Em Childs reports that she feels safe at her home and states that her relationship is not abusive  She states that she feels "trapped" and that the work she does around the house (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, cleans, 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) "is never enough"  She reports that she was drinking 4 bottles of Smirnoff Ice and  took a bottle of Benadryl with the intent to die  At this time she desires to pursue inpatient psychiatric treatment for her ongoing symptoms of depression "    On interview she is tearful and anxious but cooperative  SI for weeks, usually correlated with menses   Intrusive thoughts persisted and usual coping skills were not working so she began researching ways to overdose and purchased 2 bottles of OTC medications of diphenhydramine (reached that if taken in large amounts can lead to death)  Hid it where children would not be able to find it  Few days ago she felt numb and had SI and recalled she had ETOH in home and got the pills and decided that was the day to overdose  Was taking 5 capsules at a time with shots of ETOH and lost count when got over 40 and continued  Felt drowsy but continued until bottle almost empty  Family was home but patient in another room but daughter found her drowsy  Feels disappointed that attempt did not work  Psychiatric Review Of Systems:  sleep: yes  appetite changes: yes  weight changes: yes, lost  energy/anergy: yes  interest/pleasure/anhedonia: yes  somatic symptoms: yes  nel: no  guilty/hopeless: yes    Historical Information     Past Psychiatric History:   Inpatient Treatment: yes for suicidal statements when on SSRI  Past Psychiatric Medication Trials: effexor, Prior dx of PMDD 6-7 years ago   Substance Abuse History:  E-Cigarette/Vaping   • E-Cigarette Use Never User       E-Cigarette/Vaping Substances       Social History     Tobacco History     Smoking Status  Never Smoker    Smokeless Tobacco Use  Never Used          Alcohol History     Alcohol Use Status  Yes Drinks/Week  10 Glasses of wine per week Amount  10 0 standard drinks of alcohol/wk          Drug Use     Drug Use Status  Never          Sexual Activity     Sexually Active  Not Asked          Activities of Daily Living    Not Asked               Additional Substance Use Detail     Questions Responses    Problems Due to Past Use of Alcohol? Yes    Problems Due to Past Use of Substances?  No    Substance Use Assessment Denies substance use within the past 12 months    Alcohol Use Frequency 1 or 2 times/week    Cannabis frequency Never used    Comment:  Never used on 10/7/2022     Heroin Frequency Denies use in past 12 months    Cocaine frequency Never used    Comment:  Never used on 10/7/2022     Crack Cocaine Frequency Denies use in past 12 months    Methamphetamine Frequency Denies use in past 12 months    Narcotic Frequency Denies use in past 12 months    Benzodiazepine Frequency Denies use in past 12 months    Amphetamine frequency Denies use in past 12 months    Barbituate Frequency Denies use use in past 12 months    Inhalant frequency Never used    Comment:  Never used on 10/7/2022     Hallucinogen frequency Never used    Comment:  Never used on 10/7/2022     Ecstasy frequency Never used    Comment:  Never used on 10/7/2022     Other drug frequency Never used    Comment:  Never used on 10/7/2022     Opiate frequency Denies use in past 12 months    Last reviewed by Zac Mercado RN on 10/7/2022        I have assessed this patient for substance use within the past 12 months    Family Psychiatric History:   "Not that I know of"    Social History:  Education: currently taking online college classes  Lives at home with children (9,8,7,7)    Traumatic History:   "a lot of things happened in my past"    History reviewed  No pertinent past medical history  Medical Review Of Systems:  Pertinent items are noted in HPI   No sxs of withdrawal today    Meds/Allergies   all current active meds have been reviewed and current meds:   Current Facility-Administered Medications   Medication Dose Route Frequency   • acetaminophen (TYLENOL) tablet 650 mg  650 mg Oral Q6H PRN   • acetaminophen (TYLENOL) tablet 650 mg  650 mg Oral Q4H PRN   • acetaminophen (TYLENOL) tablet 975 mg  975 mg Oral Q6H PRN   • aluminum-magnesium hydroxide-simethicone (MYLANTA) oral suspension 30 mL  30 mL Oral Q4H PRN   • artificial tear (LUBRIFRESH P M ) ophthalmic ointment 1 application  1 application Both Eyes X5S PRN   • benztropine (COGENTIN) injection 1 mg  1 mg Intramuscular Q4H PRN Max 6/day   • benztropine (COGENTIN) tablet 1 mg  1 mg Oral Q4H PRN Max 6/day   • bisacodyl (DULCOLAX) rectal suppository 10 mg  10 mg Rectal Daily PRN   • hydrOXYzine HCL (ATARAX) tablet 50 mg  50 mg Oral Q6H PRN Max 4/day    Or   • diphenhydrAMINE (BENADRYL) injection 50 mg  50 mg Intramuscular Q6H PRN   • hydrOXYzine HCL (ATARAX) tablet 100 mg  100 mg Oral Q6H PRN Max 4/day    Or   • LORazepam (ATIVAN) injection 2 mg  2 mg Intramuscular Q6H PRN   • hydrOXYzine HCL (ATARAX) tablet 25 mg  25 mg Oral Q6H PRN Max 4/day   • melatonin tablet 3 mg  3 mg Oral HS PRN   • OLANZapine (ZyPREXA) tablet 5 mg  5 mg Oral Q4H PRN Max 3/day    Or   • OLANZapine (ZyPREXA) IM injection 2 5 mg  2 5 mg Intramuscular Q4H PRN Max 3/day   • OLANZapine (ZyPREXA) tablet 5 mg  5 mg Oral Q3H PRN Max 3/day    Or   • OLANZapine (ZyPREXA) IM injection 5 mg  5 mg Intramuscular Q3H PRN Max 3/day   • OLANZapine (ZyPREXA) tablet 2 5 mg  2 5 mg Oral Q4H PRN Max 6/day   • polyethylene glycol (MIRALAX) packet 17 g  17 g Oral Daily PRN   • senna-docusate sodium (SENOKOT S) 8 6-50 mg per tablet 1 tablet  1 tablet Oral Daily PRN   • venlafaxine (EFFEXOR-XR) 24 hr capsule 150 mg  150 mg Oral Daily     No Known Allergies    Objective   Vital signs in last 24 hours:  Temp:  [96 9 °F (36 1 °C)-100 °F (37 8 °C)] 100 °F (37 8 °C)  HR:  [] 83  Resp:  [16-20] 18  BP: ()/(53-83) 120/76    No intake or output data in the 24 hours ending 10/08/22 1152    Mental Status Evaluation:  Appearance:  age appropriate in hospital gown   Behavior:  normal   Speech:  normal pitch and normal volume   Mood:  dysthymic   Affect:  mood-congruent   Language: naming objects   Thought Process:  normal   Associations: intact associations   Thought Content:  normal   Perceptual Disturbances: None   Risk Potential: Suicidal Ideations without plan   Sensorium:  person, place and situation   Memory:  recent and remote memory grossly intact   Consciousness:  alert and awake    Attention: attention span and concentration were age appropriate Intellect: within normal limits   Fund of Knowledge: awareness of current events: intact   Insight:  fair   Judgment: fair   Muscle Strength:  Muscle Tone: normal OOB on own  normal   Gait/Station: normal gait/station   Motor Activity: no abnormal movements      Lab Results: I have personally reviewed all pertinent laboratory/tests results     Most Recent Labs:   Lab Results   Component Value Date    WBC 8 67 10/07/2022    RBC 4 11 10/07/2022    HGB 11 4 (L) 10/07/2022    HCT 35 3 10/07/2022     10/07/2022    RDW 13 7 10/07/2022    NEUTROABS 8 99 (H) 10/06/2022    SODIUM 138 10/07/2022    K 3 2 (L) 10/07/2022     10/07/2022    CO2 22 10/07/2022    BUN 7 10/07/2022    CREATININE 0 70 10/07/2022    GLUC 135 (H) 10/07/2022    GLUF 62 (L) 10/06/2022    CALCIUM 8 2 (L) 10/07/2022    AST 36 10/07/2022    ALT 25 10/07/2022    ALKPHOS 76 10/07/2022    TP 6 6 10/07/2022    ALB 3 5 10/07/2022    TBILI 0 33 10/07/2022    PREGSERUM Negative 10/08/2022     EKG   Lab Results   Component Value Date    VENTRATE 119 10/06/2022    ATRIALRATE 119 10/06/2022    PRINT 154 10/06/2022    QRSDINT 88 10/06/2022    QTINT 344 10/06/2022    QTCINT 483 10/06/2022    PAXIS 41 10/06/2022    QRSAXIS 21 10/06/2022    TWAVEAXIS 19 10/06/2022      Wt Readings from Last 3 Encounters:   10/07/22 92 1 kg (203 lb)   10/06/22 92 3 kg (203 lb 8 oz)   10/06/22 92 5 kg (203 lb 14 8 oz)     Temp Readings from Last 3 Encounters:   10/08/22 100 °F (37 8 °C) (Tympanic)   10/07/22 98 6 °F (37 °C) (Temporal)   10/06/22 99 6 °F (37 6 °C) (Temporal)     BP Readings from Last 3 Encounters:   10/08/22 120/76   10/07/22 107/68   10/06/22 121/80     Pulse Readings from Last 3 Encounters:   10/08/22 83   10/07/22 94   10/06/22 96         Code Status: Level 1 - Full Code  Advance Directive and Living Will:      Power of :    POLST:      Patient Strengths/Assets: patient is on a voluntary commitment    Patient Barriers/Limitations: poor support system    Counseling / Coordination of Care  Total floor / unit time spent today 50 minutes  Greater than 50% of total time was spent with the patient and / or family counseling and / or coordination of care   A description of the counseling / coordination of care: medications

## 2022-10-08 NOTE — ASSESSMENT & PLAN NOTE
· Patient admitted to [facility] and transferred to [facility] Detox unit following intentional overdose with suicidal intent on 10/5 via ingestion of unknown amounts of PO benadryl 50 mg tablets + 4 bottles Smirnoff Ices  · Patient did experience anticholinergic toxidrome (tachycardia, urinary retention requiring patel catheter, dry mouth, etc) which improved with supportive care on detox unit and patient was cleared for inpatient psychiatry on 10/7  · Patient currently is tired but otherwise without symptoms, voiding well    · Further treatment per psychiatry

## 2022-10-08 NOTE — TREATMENT TEAM
10/08/22 0900   Team Meeting   Meeting Type Daily Rounds   Team Members Present   Team Members Present Physician;Nurse   Physician Team Member Dr Casie Restrepo Team Member Arnoldo Newsome   Patient/Family Present   Patient Present No   Patient's Family Present No     AM rounds- new admit, From 5T unit following intentional overdose of benedryl and alcohol  Reports this was first attempt  Had hallucinations on 5T however denies here  Remains calm and cooperative  Slept well  Readmit score- 15

## 2022-10-08 NOTE — QUICK NOTE
Patient had the following dropped off:     Dufflebag  3 books  Hairbrush  5 leggings  sweatpants  4 longsleeves  2 PJ pants (strings)  5 tshirts  Towel  6 underwear  3 bras  4 pairs of socks  2 mismatched socks  Toothbrush   2 moisturizers  Soap   Deoderant  Toothpaste   Sandals

## 2022-10-08 NOTE — PROGRESS NOTES
Progress Note - Behavioral Health   Parker WillsRaghavhaydenrosavahid 28 y o  female MRN: [id number]  Unit/Bed#: [facility] 261-01 Encounter: [id number]    The patient was seen for continuing care and reviewed with treatment team     Current Mental Status Evaluation:  Appearance:  Adequate hygiene and grooming   Behavior:  guarded, fair eye contact   Mood:  Dysphoric   Affect: mood-congruent   Speech: Soft   Thought Process:  Goal directed and coherent   Thought Content:  Does not verbalize delusional material   Perceptual Disturbances: Denies hallucinations and does not appear to be responding to internal stimuli   Risk Potential: No suicidal or homicidal ideation but appears depressed   Orientation:   Person, place, situation   Increased effexor tolerated without side-effects and 72 hour notice signed today and she has been talking on the phone  Withdrawn to room and reported intrusive self harm thoughts in evening but able to work with staff  Denies any SI today, talking to family this AM   Progress Toward Goals: No significant events in the past 24 hours  Principal Problem:    Suicide attempt [facility])  Active Problems:    Medical clearance for psychiatric admission    Discharge planning update: The patient will return to previous living arrangement    Recommended Treatment: Continue with pharmacotherapy, group therapy, milieu therapy and occupational therapy    The patient will be maintained on the following medications:  Current Facility-Administered Medications   Medication Dose Route Frequency Provider Last Rate   • acetaminophen  650 mg Oral Q6H PRN Shaka Gurrola PA-C     • acetaminophen  650 mg Oral Q4H PRN Shaka Gurrola PA-C     • acetaminophen  975 mg Oral Q6H PRN Shaka Gurrola PA-C     • aluminum-magnesium hydroxide-simethicone  30 mL Oral Q4H PRN Shaka Gurrola PA-C     • artificial tear  1 application Both Eyes B9I PRN Shaka Gurrola PA-C     • benztropine  1 mg Intramuscular Q4H PRN Max 6/day Hugo Diez Yg Morgan PA-C     • benztropine  1 mg Oral Q4H PRN Max 6/day Vincent Dec, PA-C     • bisacodyl  10 mg Rectal Daily PRN Vincent Dec, PA-C     • hydrOXYzine HCL  50 mg Oral Q6H PRN Max 4/day Vincent Dec, PA-C      Or   • diphenhydrAMINE  50 mg Intramuscular Q6H PRN Vincent Dec, PA-C     • hydrOXYzine HCL  100 mg Oral Q6H PRN Max 4/day Vincent Dec, PA-C      Or   • LORazepam  2 mg Intramuscular Q6H PRN Vincent Dec, PA-C     • hydrOXYzine HCL  25 mg Oral Q6H PRN Max 4/day Vincent Dec, PA-C     • melatonin  3 mg Oral HS PRN Vincent Dec, PA-C     • OLANZapine  5 mg Oral Q4H PRN Max 3/day Vincent Dec, PA-C      Or   • OLANZapine  2 5 mg Intramuscular Q4H PRN Max 3/day Vincent Dec, PA-C     • OLANZapine  5 mg Oral Q3H PRN Max 3/day Vincent Dec, PA-C      Or   • OLANZapine  5 mg Intramuscular Q3H PRN Max 3/day Vincent Dec, PA-C     • OLANZapine  2 5 mg Oral Q4H PRN Max 6/day Vincent Dec, PA-C     • polyethylene glycol  17 g Oral Daily Ashley Frankel [place], [place]     • senna-docusate sodium  1 tablet Oral HS Ashley Frankel [place], PA-C     • [START ON 10/9/2022] venlafaxine  225 mg Oral Daily Tyree Guzman MD

## 2022-10-08 NOTE — PLAN OF CARE
Problem: SELF HARM/SUICIDALITY  Goal: Will have no self-injury during hospital stay  Description: INTERVENTIONS:  - Q 15 MINUTES: Routine safety checks  - Q WAKING SHIFT & PRN: Assess risk to determine if routine checks are adequate to maintain patient safety  - Encourage patient to participate actively in care by formulating a plan to combat response to suicidal ideation, identify supports and resources  Outcome: Progressing     Problem: ANXIETY  Goal: Will report anxiety at manageable levels  Description: INTERVENTIONS:  - Administer medication as ordered  - Teach and encourage coping skills  - Provide emotional support  - Assess patient/family for anxiety and ability to cope  Outcome: Progressing  Goal: By discharge: Patient will verbalize 2 strategies to deal with anxiety  Description: Interventions:  - Identify any obvious source/trigger to anxiety  - Staff will assist patient in applying identified coping technique/skills  - Encourage attendance of scheduled groups and activities  Outcome: Progressing     Problem: SLEEP DISTURBANCE  Goal: Will exhibit normal sleeping pattern  Description: Interventions:  -  Assess the patients sleep pattern, noting recent changes  - Administer medication as ordered  - Decrease environmental stimuli, including noise, as appropriate during the night  - Encourage the patient to actively participate in unit groups and or exercise during the day to enhance ability to achieve adequate sleep at night  - Assess the patient, in the morning, encouraging a description of sleep experience  Outcome: Progressing     Problem: SELF CARE DEFICIT  Goal: Return ADL status to a safe level of function  Description: INTERVENTIONS:  - Administer medication as ordered  - Assess ADL deficits and provide assistive devices as needed  - Obtain PT/OT consults as needed  - Assist and instruct patient to increase activity and self care as tolerated  Outcome: Progressing     Problem: DEPRESSION  Goal: Will be euthymic at discharge  Description: INTERVENTIONS:  - Administer medication as ordered  - Provide emotional support via 1:1 interaction with staff  - Encourage involvement in milieu/groups/activities  - Monitor for social isolation  Outcome: Not Progressing

## 2022-10-08 NOTE — NURSING NOTE
Pt is awake, alert, depressed affect  OOB to speak with  on phone and returned to room  Offered pt to watch TV or walk halls to get OOB, pt declined  Pt looked through book cart but not interested  Currently working on [employer]  Hopeless, depressed  Intermittent negative thoughts  Pt states being able to reach out to staff, no thoughts to harm self

## 2022-10-08 NOTE — ASSESSMENT & PLAN NOTE
Admission labs pending  Prior to admission CBC and BMP acceptable  Vitals stable   UDS negative  COVID-19 negative  EKG pending  Medically stable for continued inpatient psychiatric treatment based on available results

## 2022-10-08 NOTE — CONSULTS
Negro 10 1990, 28 y o  female MRN: [id number]  Unit/Bed#: [facility] 256-01 Encounter: [id number]  Primary Care Provider: No primary care provider on file  Date and time admitted to hospital: 10/7/2022  5:34 PM    Inpatient consult for Medical Clearance for [address] [place] [address] patient  Consult performed by: Ailyn Arciniega PA-C  Consult ordered by: Puma Rodrigues PA-C          Medical clearance for psychiatric admission  Assessment & Plan  Admission labs pending  Prior to admission CBC and BMP acceptable  Vitals stable   UDS negative  COVID-19 negative  EKG pending  Medically stable for continued inpatient psychiatric treatment based on available results    * Suicide attempt [facility])  Assessment & Plan  · Patient admitted to [facility] and transferred to [address] [place] [address] Detox unit following intentional overdose with suicidal intent on 10/5 via ingestion of unknown amounts of PO benadryl 50 mg tablets + 4 bottles Smirnoff Ices  · Patient did experience anticholinergic toxidrome (tachycardia, urinary retention requiring patel catheter, dry mouth, etc) which improved with supportive care on detox unit and patient was cleared for inpatient psychiatry on 10/7  · Patient currently is tired but otherwise without symptoms, voiding well  · Further treatment per psychiatry        Counseling / Coordination of Care Time: 30 minutes  Greater than 50% of total time spent on patient counseling and coordination of care  Collaboration of Care: Were Recommendations Directly Discussed with Primary Treatment Team? - No     History of Present Illness:    Viri Ibrahim is a 28 y o  female with PMH of depression who is originally admitted to the psychiatry service due to intentional overdose with suicidal intent   Patient originally presented to [facility] ED on 10/5 after ingestion of unknown amounts of 50 mg Benadryl tablets and 4 Smirnoff Ices resulting in disorientation, altered mental status  Patient was subsequently transferred to [facility] detox unit due to anticholinergic symptoms including AMS, dry mouth, tachycardia, urinary retention etc  The patient's symptoms resolved and she was discharged to inpatient psychiatry on 10/7  We are consulted for medical clearance for admission to [address] [place] [address] and treatment of underlying psychiatric illness  Patient denies pertinent medical conditions for which she takes daily non-psychiatric medications  She endorses drinking ETOH every other weekend and states she drinks "enough to get drunk"  She denies additional substance use  She currently reports feeling tired and bloated, does not know when her last BM was  She had some dysuria after removal of patel catheter which has since resolved and she is voiding without complications  She denies additional fevers or chills, headaches/dizziness, chest pain, shortness a breath, cough or wheeze, sore throat, nausea/vomiting, abdominal pain, rashes or wounds, additional complaints  Vitals and labs are stable, based on all available data, patient appears medically stable to continue inpatient psychiatric treatment at this time  Review of Systems:    Review of Systems   Constitutional: Positive for fatigue  Negative for chills and fever  HENT: Negative for congestion, rhinorrhea and sore throat  Eyes: Negative for visual disturbance  Respiratory: Negative for cough, chest tightness, shortness of breath and wheezing  Cardiovascular: Negative for chest pain, palpitations and leg swelling  Gastrointestinal: Positive for abdominal distention and constipation  Negative for abdominal pain, diarrhea, nausea and vomiting  Genitourinary: Negative for difficulty urinating, dysuria, frequency and urgency  Musculoskeletal: Negative for arthralgias, back pain and myalgias  Skin: Negative for rash and wound     Neurological: Negative for dizziness, light-headedness and headaches  All other systems reviewed and are negative  Past Medical and Surgical History:     History reviewed  No pertinent past medical history  Past Surgical History:   Procedure Laterality Date   • HYSTERECTOMY         Meds/Allergies:    PTA meds:   Prior to Admission Medications   Prescriptions Last Dose Informant Patient Reported? Taking?   venlafaxine (EFFEXOR-XR) 75 mg 24 hr capsule 10/7/2022 at Unknown time Spouse/Significant Other Yes Yes   Sig: Take 75 mg by mouth 2 (two) times a day      Facility-Administered Medications: None       Allergies: No Known Allergies    Social History:     Marital Status: Single    Substance Use History:   Social History     Substance and Sexual Activity   Alcohol Use Yes   • Alcohol/week: 10 0 standard drinks   • Types: 10 Glasses of wine per week     Social History     Tobacco Use   Smoking Status Never Smoker   Smokeless Tobacco Never Used     Social History     Substance and Sexual Activity   Drug Use Never       Family History:    History reviewed  No pertinent family history  Physical Exam:     Vitals:   Blood Pressure: 120/76 (10/08/22 1119)  Pulse: 83 (10/08/22 1119)  Temperature: 100 °F (37 8 °C) (10/08/22 1119)  Temp Source: Tympanic (10/08/22 1119)  Respirations: 18 (10/08/22 1119)  Height: 5' 2" (157 5 cm) (10/07/22 1737)  Weight - Scale: 92 1 kg (203 lb) (10/07/22 1737)  SpO2: 100 % (10/07/22 1737)    Physical Exam  Vitals and nursing note reviewed  Constitutional:       General: She is not in acute distress  Appearance: She is not ill-appearing  HENT:      Head: Normocephalic and atraumatic  Nose: Nose normal    Eyes:      General:         Right eye: No discharge  Left eye: No discharge  Extraocular Movements: Extraocular movements intact  Conjunctiva/sclera: Conjunctivae normal    Cardiovascular:      Rate and Rhythm: Normal rate and regular rhythm  Heart sounds: Normal heart sounds   No murmur heard  Pulmonary:      Effort: Pulmonary effort is normal  No respiratory distress  Breath sounds: Normal breath sounds  No wheezing, rhonchi or rales  Abdominal:      General: Bowel sounds are normal       Palpations: Abdomen is soft  Tenderness: There is abdominal tenderness  There is no guarding  Comments: Belly is soft, bowel sounds positive, however with mild tenderness is greatest over left lower quadrant   Musculoskeletal:      Right lower leg: No edema  Left lower leg: No edema  Skin:     General: Skin is warm and dry  Neurological:      Mental Status: She is alert and oriented to person, place, and time  Psychiatric:         Mood and Affect: Mood normal          Behavior: Behavior normal          Additional Data:     Lab Results: I have personally reviewed pertinent reports  Results from last 7 days   Lab Units 10/07/22  0456 10/06/22  0540   WBC Thousand/uL 8 67 11 67*   HEMOGLOBIN g/dL 11 4* 11 6   HEMATOCRIT % 35 3 36 8   PLATELETS Thousands/uL 327 361   NEUTROS PCT %  --  78*   LYMPHS PCT %  --  16   MONOS PCT %  --  6   EOS PCT %  --  0     Results from last 7 days   Lab Units 10/07/22  0456   SODIUM mmol/L 138   POTASSIUM mmol/L 3 2*   CHLORIDE mmol/L 108   CO2 mmol/L 22   BUN mg/dL 7   CREATININE mg/dL 0 70   ANION GAP mmol/L 8   CALCIUM mg/dL 8 2*   ALBUMIN g/dL 3 5   TOTAL BILIRUBIN mg/dL 0 33   ALK PHOS U/L 76   ALT U/L 25   AST U/L 36   GLUCOSE RANDOM mg/dL 135*     Results from last 7 days   Lab Units 10/05/22  2131   INR  1 07         No results found for: HGBA1C        EKG, Pathology, and Other Studies Reviewed on Admission:   · EKG pending     ** Please Note: This note has been constructed using a voice recognition system   **

## 2022-10-09 LAB
25(OH)D3 SERPL-MCNC: 13.2 NG/ML (ref 30–100)
ALBUMIN SERPL BCP-MCNC: 3.5 G/DL (ref 3.5–5)
ALP SERPL-CCNC: 114 U/L (ref 46–116)
ALT SERPL W P-5'-P-CCNC: 51 U/L (ref 12–78)
ANION GAP SERPL CALCULATED.3IONS-SCNC: 9 MMOL/L (ref 4–13)
AST SERPL W P-5'-P-CCNC: 23 U/L (ref 5–45)
ATRIAL RATE: 90 BPM
BASOPHILS # BLD AUTO: 0.01 THOUSANDS/ΜL (ref 0–0.1)
BASOPHILS NFR BLD AUTO: 0 % (ref 0–1)
BILIRUB SERPL-MCNC: 0.3 MG/DL (ref 0.2–1)
BUN SERPL-MCNC: 11 MG/DL (ref 5–25)
CALCIUM SERPL-MCNC: 9.1 MG/DL (ref 8.3–10.1)
CHLORIDE SERPL-SCNC: 104 MMOL/L (ref 96–108)
CHOLEST SERPL-MCNC: 153 MG/DL
CO2 SERPL-SCNC: 26 MMOL/L (ref 21–32)
CREAT SERPL-MCNC: 0.75 MG/DL (ref 0.6–1.3)
EOSINOPHIL # BLD AUTO: 0 THOUSAND/ΜL (ref 0–0.61)
EOSINOPHIL NFR BLD AUTO: 0 % (ref 0–6)
ERYTHROCYTE [DISTWIDTH] IN BLOOD BY AUTOMATED COUNT: 13.3 % (ref 11.6–15.1)
EST. AVERAGE GLUCOSE BLD GHB EST-MCNC: 111 MG/DL
GFR SERPL CREATININE-BSD FRML MDRD: 105 ML/MIN/1.73SQ M
GLUCOSE SERPL-MCNC: 78 MG/DL (ref 65–140)
HBA1C MFR BLD: 5.5 %
HCT VFR BLD AUTO: 35.2 % (ref 34.8–46.1)
HDLC SERPL-MCNC: 38 MG/DL
HGB BLD-MCNC: 11.2 G/DL (ref 11.5–15.4)
IMM GRANULOCYTES # BLD AUTO: 0.05 THOUSAND/UL (ref 0–0.2)
IMM GRANULOCYTES NFR BLD AUTO: 1 % (ref 0–2)
LDLC SERPL CALC-MCNC: 95 MG/DL (ref 0–100)
LYMPHOCYTES # BLD AUTO: 2.64 THOUSANDS/ΜL (ref 0.6–4.47)
LYMPHOCYTES NFR BLD AUTO: 29 % (ref 14–44)
MCH RBC QN AUTO: 27.2 PG (ref 26.8–34.3)
MCHC RBC AUTO-ENTMCNC: 31.8 G/DL (ref 31.4–37.4)
MCV RBC AUTO: 85 FL (ref 82–98)
MONOCYTES # BLD AUTO: 0.53 THOUSAND/ΜL (ref 0.17–1.22)
MONOCYTES NFR BLD AUTO: 6 % (ref 4–12)
NEUTROPHILS # BLD AUTO: 5.84 THOUSANDS/ΜL (ref 1.85–7.62)
NEUTS SEG NFR BLD AUTO: 64 % (ref 43–75)
NONHDLC SERPL-MCNC: 115 MG/DL
NRBC BLD AUTO-RTO: 0 /100 WBCS
P AXIS: 41 DEGREES
PLATELET # BLD AUTO: 334 THOUSANDS/UL (ref 149–390)
PMV BLD AUTO: 9.2 FL (ref 8.9–12.7)
POTASSIUM SERPL-SCNC: 3.4 MMOL/L (ref 3.5–5.3)
PR INTERVAL: 142 MS
PROT SERPL-MCNC: 7.4 G/DL (ref 6.4–8.4)
QRS AXIS: 21 DEGREES
QRSD INTERVAL: 78 MS
QT INTERVAL: 338 MS
QTC INTERVAL: 413 MS
RBC # BLD AUTO: 4.12 MILLION/UL (ref 3.81–5.12)
SODIUM SERPL-SCNC: 139 MMOL/L (ref 135–147)
T WAVE AXIS: 16 DEGREES
TRIGL SERPL-MCNC: 100 MG/DL
TSH SERPL DL<=0.05 MIU/L-ACNC: 2.65 UIU/ML (ref 0.45–4.5)
VENTRICULAR RATE: 90 BPM
WBC # BLD AUTO: 9.07 THOUSAND/UL (ref 4.31–10.16)

## 2022-10-09 PROCEDURE — 85025 COMPLETE CBC W/AUTO DIFF WBC: CPT | Performed by: PHYSICIAN ASSISTANT

## 2022-10-09 PROCEDURE — 99232 SBSQ HOSP IP/OBS MODERATE 35: CPT | Performed by: PSYCHIATRY & NEUROLOGY

## 2022-10-09 PROCEDURE — 80061 LIPID PANEL: CPT | Performed by: PHYSICIAN ASSISTANT

## 2022-10-09 PROCEDURE — 93010 ELECTROCARDIOGRAM REPORT: CPT | Performed by: INTERNAL MEDICINE

## 2022-10-09 PROCEDURE — 82306 VITAMIN D 25 HYDROXY: CPT | Performed by: PHYSICIAN ASSISTANT

## 2022-10-09 PROCEDURE — 84443 ASSAY THYROID STIM HORMONE: CPT | Performed by: PHYSICIAN ASSISTANT

## 2022-10-09 PROCEDURE — 80053 COMPREHEN METABOLIC PANEL: CPT | Performed by: PHYSICIAN ASSISTANT

## 2022-10-09 PROCEDURE — 83036 HEMOGLOBIN GLYCOSYLATED A1C: CPT | Performed by: PHYSICIAN ASSISTANT

## 2022-10-09 RX ADMIN — POLYETHYLENE GLYCOL 3350 17 G: 17 POWDER, FOR SOLUTION ORAL at 09:24

## 2022-10-09 RX ADMIN — SENNOSIDES AND DOCUSATE SODIUM 1 TABLET: 50; 8.6 TABLET ORAL at 21:45

## 2022-10-09 RX ADMIN — VENLAFAXINE HYDROCHLORIDE 225 MG: 150 CAPSULE, EXTENDED RELEASE ORAL at 09:23

## 2022-10-09 NOTE — NURSING NOTE
Pt is awake, sitting on bed during interaction  Reports feeling "okay" this morning, reports poor sleep states it was too warm in the room   dropped off personal effects last evening, pt is in own clothing, has books bedside  Encouraged OOB for group or to walk halls today, pt will consider  No questions about medications

## 2022-10-09 NOTE — PROGRESS NOTES
Diagnosis of Suicide attempt reviewed  Short term goals for decrease in depressive symptoms, decrease in suicidal thoughts discussed  All parties in agreement and treatment plan signed      10/09/22 8764   Team Meeting   Meeting Type Tx Team Meeting   Team Members Present   Team Members Present Physician;Nurse;   Physician Team Member Dr Rona Jacobs Team Member [facility] Team Member Sivakumar   Patient/Family Present   Patient Present Yes   Patient's Family Present No

## 2022-10-09 NOTE — NURSING NOTE
Pt approached writer asking "When am I going to leave?" Writer explained process and members of Treatment Team  Will meet with Psychiatrist today and treatment team meeting should occur on Monday to further discuss plans of discharge  Pt explained their patient rights and 72hr request process  Pt did sign a 72hr on 10/9/22 at 0930  Pt states feeling safe to return home and misses children  Encouraged pt to attempt group participation today

## 2022-10-09 NOTE — DISCHARGE INSTR - APPOINTMENTS
You will be discharged to [facility] [address], [address] [facility] confirmed that your cell phone number is: [phone]          Israel Lamar or Radha, [address], will be calling you after your discharge, on the phone number that you provided  They will be available as an additional support, if needed  If you wish to speak with one of them, you may contact Army Gillette at [phone] or Elisa Galindo at [phone]

## 2022-10-09 NOTE — DISCHARGE INSTR - OTHER ORDERS
[facility] is a confidential 7 days/week telephone support service manned by trained mental health consumers  [employer] operates daily but is not able to accept calls between 2AM-6AM    [employer] provides support, a listening ear and can provide information about available services  [employer] specializes in the concerns of mental health consumers, their families and friends  However, we are also here for anyone who has a mental health concern, is confused about or just doesn't know anything about mental health or where to get information  To reach [place], call [phone] accepts calls between 6:00 AM to 10:00 AM and from 4:00 PM to 12:00 AM    Text CONNECT to 061661 from anywhere in the [place], anytime, about any type of crisis  A live, trained Crisis Counselor receives the text and lets you know that they are here to listen  The volunteer Crisis Counselor will help you move from a hot moment to a cool moment  [address] [facility] Intervention - licensed telephone and mobile crisis services that provide mental health assessments to all age groups regardless of income or insurance  Crisis Intervention operates 24-hour/7 days a week  [address] [place] [address] assists consumer who are experiencing a mental health emergency and lack the resources to assist themselves  Immediate intervention for suicidal and depressed individuals with home visits/outreach being top priority  Crisis can be contacted at [phone]  The [facility] Mental Illness ([facility]) offers various education & support groups for you & your family  For more information visit their website at   http://www TutorDudes/   Dial 2-1-1 to get connected/get help    Free, confidential information & referral available 24/7: Aging Services, Child & Youth Services, Counseling, Education/Training, Food/Shelter/Clothing, Health Services, Parenting, Substance Abuse, Support Groups, Volunteer Opportunities, & much more  Phone: 2Xiuhcoatl@Pixowl8-3671, Web: JESSICA GR864GNQH [facility], Email: Gibson Avendaño [facility] 15 [phone]) [phone]  For additional information, contact the Department of Human Services Information and Referral Unit at  [phone] between the hours of 8:30 a m  and 4:30 p m , Monday through Friday  An assessment is the first step in the process for [facility] residents to get the assistance they need  Any of the providers listed below are able to complete an assessment  After contacting a provider, an appointment for the assessment is scheduled  During the appointment an  will gather information to determine the level of treatment that is needed  In addition, assistance will be provided in completing the necessary paperwork to access treatment including a liability determination, release(s) of information, and may also include an application to the [place] [address] for [facility]  The assessment process may take up to 2 hours to complete  Upon completion of the paperwork and assessment process, the  will determine the recommended level of care needed and provide assistance with the referral process   American Organization - provides substance abuse assessment services for adults  [address] [facility], [address] [facility]  Phone: [phone] ext  [phone]  Fax: [phone]  Walk in hours Mondays - Fridays 8AM-3PM     [facility] ([place] [facility]) - provides screening and assessment, outpatient and intensive outpatient services for both adolescents and adults  Day and evening services available    [address]  Phone: [phone]  Fax: [phone] [address] Har[address]reet López - is a dual diagnosis outpatient program that provides assessment / treatment recommendations, individual, group and family therapy, intensive outpatient therapy, outpatient therapy, professional consultations, educational presentations and workshops, and urine screens for drugs of abuse  [facility]  [address] [facility] Keo Johnson [address]  [place], [address]  Phone: [phone]  Fax: [phone]  Walk in hours Mondays 9AM-5PM and Tuesdays - Fridays 9AM-2PM     [facility]  - is a Department of Health, Drug and Alcohol- licensed facility that provides treatment to adults with substance abuse and mental illness  The program provides individual, group and family therapy and psychiatric services  [address] [place] [address] [address], [address]  Phone: [phone]  Fax: [phone]      [address] - provides intensive outpatient and outpatient treatment services to adolescents, adults and families experiencing drug and/or alcohol problems  Treatment modalities include individual, group and family counseling  Weekend DUI classes are available  [facility] is a division of [facility] 94  [place], 98 [facility]  Phone: [phone]  Fax: [phone]  Walk in hours Mondays - Fridays 8:30AM-3:30PM      Primary Care Doctor Services    When your insurance becomes active Medical Assistance will send you paperwork to choose your primary care doctor  If you should need services before that below are options that work with uninsured patients  [address] [facility] provides comprehensive well and sick primary care, OB/GYN, dental and pediatric health services to the medically underserved, including the uninsured and underinsured  [facility] provides services in a community-based setting with experienced and compassionate physicians and other providers  It supports the health and wellness goals of local residents and specializes in providing improved access, coordinated care and enhanced patient / family involvement    [employer] [facility]  Kate Valera, [address] [facility] [address]  [phone]  Monday - Friday: 8 am - 5 pm  We provide care in a variety of areas, including: routine physical exams, wellness visits for infants through adults, including children's immunizations  In addition, our services include blood tests and lab studies; women's health care, including Pap smears; care and management of diabetes, asthma and high blood pressure  [address] [place] [address] Dr [address] [facility] [address] [facility]  [address] [facility] 43   Monday through Friday: 8 am - 5 pm  Saturday: 8 am - 1 pm  Health screenings  Preventive care  Care for acute illnesses and minor problems  Care of chronic illnesses  Physical examinations, including gynecological exams and Pap smears  Patient education  Immunizations  We provide care in a variety of areas, including: routine physical exams, wellness visits for infants through adults, including children's immunizations  In addition, our services include blood tests and lab studies; women's health care, including Pap smears; care and management of diabetes, asthma and high blood pressure  [address] [facility]  [address], [address] [facility],[address]  [place], [address]  Kei Perez  [phone]  Monday - Friday: 8 am - 5 pm  Health screenings  Preventive care  Care for acute illnesses and minor problems  Care of chronic illnesses  Physical examinations  Patient education  Immunizations  We offer assistance in accessing various resources through our , Financial Counselor and Outpatient Care Manager  In addition, we have a Certified  on site to help facilitate optimal communication with patients, care and management of diabetes and other chronic illnesses, including transitioning from hospital to home through specialized office visits   We can perform many point-of-care tests at the time of your visit including, EKG's, Hemoglobin A1C, blood glucose fingerstick, diabetic eye exams, urine dip, spirometry and hearing/vision tests  [address]   LOCATION:  Glenda Flores at [facility]  (located inside [facility])  [address] [facility] 34, [facility], 98 [facility]  PHONE: [address]:  Monday: 8 am - 8 pm  Tuesday: 8 am - 8 pm  Wednesday: 8 am - 4:30 pm  Thursday: 8 am - 8 pm  Friday: 8 am - 6 pm  Saturday: Closed  Sunday: Closed  [facility]:  [address] [facility], [facility]  PHONE: [phone]  HOURS:  Monday: 8 am - 4:30 pm  Tuesday: 8 am - 4:30 pm  Wednesday: 8 am - 4:30 pm  Thursday: 8 am - 4:30 pm  Friday: 8 am - 4:30 pm  Saturday: Closed  Sunday: Closed  [address] [facility]  (inside [facility])  [address] [facility] [place], [address] [place] [address]  PHONE: [phone]  HOURS:  Monday: 8 am - 8 pm  Tuesday: 8 am - 4:30 pm  Wednesday: 8 am - 4:30 pm  Thursday: 8 am - 8 pm  Friday: 8 am - 4:30 pm  Saturday: Closed  Sunday: Closed  [address]:  [address] [place]  and [address]  PHONE: [phone]  HOURS:  Please note that our hours have changed    Monday: 8 am - 8 pm  Tuesday: 8 am - 8 pm  Wednesday: 8 am - 4:30 pm  Thursday: 8 am - 8 pm  Friday: 8 am - 6 pm  Saturday: Closed  Sunday: Closed    Alexa Botello is the main contact from [address] and her direct number is [phone], her email contact is [email or web address]

## 2022-10-09 NOTE — CASE MANAGEMENT
INTAKE    Readmit score:  Yellow 15   Confirmed Address   [address] [place] [address] Ave: [facility]     Resides in the home with/can return?:    Pt lives with her  and 4 children    Confirmed Phone Number: [phone]   Commitment Status/Admitted from: [address] [facility] ED 10/5-10/6 then transferred to [address] [place] [address] Detox 10/6/22-10/7/22    Presenting C/O:             ED note   "Assessment & Plan  · Spouse reports patient overdosed with Benadryl 50 mg   Drank pills with Smirnoff Ice cooler  · ER discussed with poison control, shortage of physostigmine, recommended observe for 6-8 hours, p r n  benzodiazepine for agitation      · UDS, acetaminophen and salicylate level normal  · IV hydration  · Rodriguez catheter for I&O given risk for urinary retention  · IV lorazepam p r n  agitation  · Monitor temperature closely  · Monitor on telemetry  Discussed case with toxicology recommending transfer to detox Unit at [facility] "   Psychiatrist:    Pt reported that she does not have Psychiatrist but is open to a referral     Therapist:    Pt reported she does not have a therapist but is open to a referral     ACT/ICM/CPS/WRT/SC: N/A   PCP:    N/A   Work/Income:      Pt is a stay at home mom    Legal/  Probation/[place] Ofc:    Denies   Referrals Needed: OP Therapy and Medication Management    Transport at Discharge:    Pt reported her  will pick her up    IMM:   [address]: Declined   Emergency Contact:     [facility] ([address] [place] [address])   [phone]   ROIs obtained:       Outpatient Referral      Insurance:     [facility]    Audit:        PAWSS:  BAT:  UDS: Negative

## 2022-10-09 NOTE — TREATMENT TEAM
10/09/22 1000   Team Meeting   Meeting Type Daily Rounds   Team Members Present   Team Members Present Physician;Nurse   Physician Team Member Dr Gianfranco Hernandez Team Member Nieves Mendoza   Patient/Family Present   Patient Present No   Patient's Family Present No       AM rounds- reports having passive SAB thoughts, denies HI  Withdrawn to room  Declined shower  Flat affect  Slept well   Signed 72 hour notice 10/9

## 2022-10-10 LAB — RPR SER QL: NORMAL

## 2022-10-10 PROCEDURE — 99232 SBSQ HOSP IP/OBS MODERATE 35: CPT | Performed by: STUDENT IN AN ORGANIZED HEALTH CARE EDUCATION/TRAINING PROGRAM

## 2022-10-10 RX ADMIN — VENLAFAXINE HYDROCHLORIDE 225 MG: 150 CAPSULE, EXTENDED RELEASE ORAL at 08:16

## 2022-10-10 RX ADMIN — SENNOSIDES AND DOCUSATE SODIUM 1 TABLET: 50; 8.6 TABLET ORAL at 21:52

## 2022-10-10 NOTE — PROGRESS NOTES
Progress Note - Behavioral Health   Shavonne Lucas WillsHumza 28 y o  female MRN: [id number]  Unit/Bed#: [facility] 261-01 Encounter: [id number]    Assessment/Plan   Principal Problem:    Suicide attempt [facility])  Active Problems:    Medical clearance for psychiatric admission      Subjective: Patient was seen, chart was reviewed, and case was discussed with the team  This is 29 yo female with hx of depression/anxiety admitted to inpatient unit on voluntary status for over dosing on pills in the context of psychosocial stressors  Patient says "I was numb and it was impulsive" Patient endorses depressed mood for a while especially prior to menses  She has been having thoughts for while and bought pills and hid them in a safe place 2 months ago  Reports was able to talk herself out of these suicidal thoughts with family support however this time she did not have anybody to talk to  She is remorseful for her actions  Mood has improved after her menses  She misses her children and focused on discharge  Denies any thoughts to hurt self       Behavior over the last 24 hours:  improved  Sleep: hypersomnia  Appetite: normal  Medication side effects: Yes drowsiness    Medical ROS: Pertinent items are noted in HPI all other systems are negative    Current Medications:  Current Facility-Administered Medications   Medication Dose Route Frequency   • acetaminophen (TYLENOL) tablet 650 mg  650 mg Oral Q6H PRN   • acetaminophen (TYLENOL) tablet 650 mg  650 mg Oral Q4H PRN   • acetaminophen (TYLENOL) tablet 975 mg  975 mg Oral Q6H PRN   • aluminum-magnesium hydroxide-simethicone (MYLANTA) oral suspension 30 mL  30 mL Oral Q4H PRN   • artificial tear (LUBRIFRESH P M ) ophthalmic ointment 1 application  1 application Both Eyes N2X PRN   • benztropine (COGENTIN) injection 1 mg  1 mg Intramuscular Q4H PRN Max 6/day   • benztropine (COGENTIN) tablet 1 mg  1 mg Oral Q4H PRN Max 6/day   • bisacodyl (DULCOLAX) rectal suppository 10 mg  10 mg Rectal Daily PRN   • hydrOXYzine HCL (ATARAX) tablet 50 mg  50 mg Oral Q6H PRN Max 4/day    Or   • diphenhydrAMINE (BENADRYL) injection 50 mg  50 mg Intramuscular Q6H PRN   • hydrOXYzine HCL (ATARAX) tablet 100 mg  100 mg Oral Q6H PRN Max 4/day    Or   • LORazepam (ATIVAN) injection 2 mg  2 mg Intramuscular Q6H PRN   • hydrOXYzine HCL (ATARAX) tablet 25 mg  25 mg Oral Q6H PRN Max 4/day   • melatonin tablet 3 mg  3 mg Oral HS PRN   • OLANZapine (ZyPREXA) tablet 5 mg  5 mg Oral Q4H PRN Max 3/day    Or   • OLANZapine (ZyPREXA) IM injection 2 5 mg  2 5 mg Intramuscular Q4H PRN Max 3/day   • OLANZapine (ZyPREXA) tablet 5 mg  5 mg Oral Q3H PRN Max 3/day    Or   • OLANZapine (ZyPREXA) IM injection 5 mg  5 mg Intramuscular Q3H PRN Max 3/day   • OLANZapine (ZyPREXA) tablet 2 5 mg  2 5 mg Oral Q4H PRN Max 6/day   • polyethylene glycol (MIRALAX) packet 17 g  17 g Oral Daily   • senna-docusate sodium (SENOKOT S) 8 6-50 mg per tablet 1 tablet  1 tablet Oral HS   • venlafaxine (EFFEXOR-XR) 24 hr capsule 225 mg  225 mg Oral Daily       Behavioral Health Medications:   all current active meds have been reviewed  Vitals:  Vitals:    10/10/22 0739   BP: 136/94   Pulse: 104   Resp: 18   Temp: (!) 97 2 °F (36 2 °C)   SpO2: 99%       Laboratory results:    I have personally reviewed all pertinent laboratory/tests results      Mental Status Evaluation:    Appearance:  age appropriate   Behavior:  cooperative   Speech:  normal pitch and normal volume   Mood:  depressed   Affect:  constricted   Thought Process:  goal directed and logical   Thought Content:  normal   Perceptual Disturbances: None   Risk Potential: Suicidal Ideations none  Homicidal Ideations none  Potential for Aggression No   Sensorium:  person, place and time/date   Memory:  recent and remote memory grossly intact   Consciousness:  alert and awake    Attention: attention span appeared shorter than expected for age   Insight:  Improving   Judgment: fair   Gait/Station: normal gait/station   Motor Activity: no abnormal movements       Progress Toward Goals: Progressing    Recommended Treatment: Continue with pharmacotherapy, group therapy, milieu therapy and occupational therapy  1 Effexor 225 mg HS  Risks, benefits and possible side effects of Medications:   Risks, benefits, alternatives, and possible side effects of patient's psychiatric medications were discussed with patient

## 2022-10-10 NOTE — NURSING NOTE
Pt awake early this morning, in bed doing sudoku  Pt complaint with medication  Denies SI/HI/AH/VH  Pt disinterested in education  Scant and guarded in interaction  Pt focused on finding out discharge date  Pt encouraged to speak with provider  Pt has a 72 hour from the 9th @0930  Denies any question or concern at this time

## 2022-10-10 NOTE — UTILIZATION REVIEW
Notification of Discharge   This is a Notification of Discharge from our facility [address]  Please be advised that this patient has been discharge from our facility  Below you will find the admission and discharge date and time including the patient’s disposition  UTILIZATION REVIEW CONTACT:  Hung Calderon MA  Utilization   Network Utilization Review Department  Phone: [phone] x carefully listen to the prompts  All voicemails are confidential   Email: [email or web address]     PHYSICIAN ADVISORY SERVICES:  FOR WEUD-LI-SEHI REVIEW - MEDICAL NECESSITY DENIAL  Phone: [phone]  Fax: [phone]  Email: [email or web address]     PRESENTATION DATE: 10/6/2022  1:47 PM  OBERVATION ADMISSION DATE:   INPATIENT ADMISSION DATE: 10/6/22  1:47 PM   DISCHARGE DATE: 10/7/2022  4:30 PM  DISPOSITION: [address]       IMPORTANT INFORMATION:  Send all requests for admission clinical reviews, approved or denied determinations and any other requests to dedicated fax number below belonging to the campus where the patient is receiving treatment   List of dedicated fax numbers:  [address] [place] [address] DENIALS (Administrative/Medical Necessity) [phone]   [address] [place] [address] (Maternity/NICU/Pediatrics) [phone]   [facility] [phone]   [facility] 87 [phone]   [address] [phone]   220 [facility] [phone]   90 [facility] [phone]   [address] [place] [address] 119 [phone]   [facility]  [phone] 4058 [facility] [phone]   [facility] [address]  [phone]

## 2022-10-10 NOTE — NURSING NOTE
Lethea Phlegm is calm upon approach, visualized resting in bed, napping intermittently throughout the late afternoon and early evening  Patient denies current SI/HI/AH/VH, however, remains withdrawn to room for much of the shift  Lethea Phlegm is scant and somewhat guarded in conversation, expresses desire to discharge  Patient has a 72 hour notice in effect, signed today, 10/9/22 at 09:30  Lethea Phlegm was encouraged to attend this evening's wrap-up group, but declined  Patient encouraged to seek staff with any issues and/or concerns, verbalized understanding

## 2022-10-11 PROBLEM — Z00.8 MEDICAL CLEARANCE FOR PSYCHIATRIC ADMISSION: Status: RESOLVED | Noted: 2022-10-08 | Resolved: 2022-10-11

## 2022-10-11 PROCEDURE — 99232 SBSQ HOSP IP/OBS MODERATE 35: CPT | Performed by: STUDENT IN AN ORGANIZED HEALTH CARE EDUCATION/TRAINING PROGRAM

## 2022-10-11 RX ADMIN — SENNOSIDES AND DOCUSATE SODIUM 1 TABLET: 50; 8.6 TABLET ORAL at 21:25

## 2022-10-11 RX ADMIN — POLYETHYLENE GLYCOL 3350 17 G: 17 POWDER, FOR SOLUTION ORAL at 09:56

## 2022-10-11 RX ADMIN — VENLAFAXINE HYDROCHLORIDE 225 MG: 150 CAPSULE, EXTENDED RELEASE ORAL at 21:25

## 2022-10-11 NOTE — DISCHARGE SUMMARY
Discharge Summary - Michele Juarez 28 y o  female MRN: [id number]  Unit/Bed#: -01 Encounter: [id number]     Admission Date: 10/7/2022         Discharge Date: 10/12/22    Attending Psychiatrist: Brianna Palmer*    Reason for Admission/HPI:     Per initial H&P from Dr Sarina Marroquin on 10/8/22:    "Patient is a 28 y o  female presents with Signs of suicidal potential   Patient was admitted to psychiatric unit on a voluntarily 201 commitment basis      Primary complaints include: depression worse and feeling suicidal   Onset of symptoms was gradual starting a few months ago with rapidly worsening course since that time  Psychosocial Stressors: family and social   She was admitted to the medical detox unit and subsequently transferred to [facility] for further management of depression  Per detox note "On assessment Elizabeth Rojas reports that she has been experiencing increased depression and has been feeling suicidal due to ongoing relationship issues and family stressors as well as concerns that she would lose her college scholarship (she is currently attending college online and pursuing a degree in behavioral sciences)  Elizabeth Rojas reports that she feels safe at her home and states that her relationship is not abusive  She states that she feels "trapped" and that the work she does around the house (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, cleans, 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) "is never enough"  She reports that she was drinking 4 bottles of Smirnoff Ice and  took a bottle of Benadryl with the intent to die  At this time she desires to pursue inpatient psychiatric treatment for her ongoing symptoms of depression "     On interview she is tearful and anxious but cooperative  SI for weeks, usually correlated with menses   Intrusive thoughts persisted and usual coping skills were not working so she began researching ways to overdose and purchased 2 bottles of OTC medications of diphenhydramine (reached that if taken in large amounts can lead to death)  Hid it where children would not be able to find it  Few days ago she felt numb and had SI and recalled she had ETOH in home and got the pills and decided that was the day to overdose  Was taking 5 capsules at a time with shots of ETOH and lost count when got over 40 and continued  Felt drowsy but continued until bottle almost empty  Family was home but patient in another room but daughter found her drowsy  Feels disappointed that attempt did not work "      Social History     Tobacco History     Smoking Status  Never Smoker    Smokeless Tobacco Use  Never Used          Alcohol History     Alcohol Use Status  Yes Drinks/Week  10 Glasses of wine per week Amount  10 0 standard drinks of alcohol/wk          Drug Use     Drug Use Status  Never          Sexual Activity     Sexually Active  Not Asked          Activities of Daily Living    Not Asked               Additional Substance Use Detail     Questions Responses    Problems Due to Past Use of Alcohol? Yes    Problems Due to Past Use of Substances?  No    Substance Use Assessment Denies substance use within the past 12 months    Alcohol Use Frequency 1 or 2 times/week    Cannabis frequency Never used    Comment:  Never used on 10/7/2022     Heroin Frequency Denies use in past 12 months    Cocaine frequency Never used    Comment:  Never used on 10/7/2022     Crack Cocaine Frequency Denies use in past 12 months    Methamphetamine Frequency Denies use in past 12 months    Narcotic Frequency Denies use in past 12 months    Benzodiazepine Frequency Denies use in past 12 months    Amphetamine frequency Denies use in past 12 months    Barbituate Frequency Denies use use in past 12 months    Inhalant frequency Never used    Comment:  Never used on 10/7/2022     Hallucinogen frequency Never used    Comment:  Never used on 10/7/2022     Ecstasy frequency Never used    Comment:  Never used on 10/7/2022     Other drug frequency Never used    Comment:  Never used on 10/7/2022     Opiate frequency Denies use in past 12 months    Last reviewed by Jacqui Mast RN on 10/7/2022          History reviewed  No pertinent past medical history  Past Surgical History:   Procedure Laterality Date   • HYSTERECTOMY         Medications: All current active medications have been reviewed  Medications prior to admission:    Prior to Admission Medications   Prescriptions Last Dose Informant Patient Reported? Taking?   venlafaxine (EFFEXOR-XR) 75 mg 24 hr capsule Not Taking at Unknown time Spouse/Significant Other Yes No   Sig: Take 75 mg by mouth 2 (two) times a day   Patient not taking: Reported on 10/11/2022      Facility-Administered Medications: None       Allergies:     No Known Allergies    Objective     Vital signs in last 24 hours:    Temp:  [97 8 °F (36 6 °C)-98 3 °F (36 8 °C)] 98 3 °F (36 8 °C)  HR:  [93-97] 93  Resp:  [18] 18  BP: (113-128)/(79-81) 113/79    No intake or output data in the 24 hours ending 10/12/22 June Godinez 73 Course:     Lis Shabazz was admitted to the inpatient psychiatric unit and started on [address] checks every 7 minutes  During the hospitalization she was encouraged to attend individual therapy, group therapy, milieu therapy and occupational therapy  Psychiatric medications were adjusted over the hospital stay  To address depressive symptoms, self-abusive behavior and anxiety symptoms, Lis Shabazz was treated with antidepressant Venlafaxine ER  Medication doses were adjusted during the hospital course  Venlafaxine ER was adjusted to 225mg qhs  Prior to beginning of treatment medications risks and benefits and possible side effects including risk of suicidality and serotonin syndrome related to treatment with antidepressants were reviewed with Cecilia  She verbalized understanding and agreement for treatment   Upon admission Lis Shabazz was seen by medical service for medical clearance for inpatient treatment and medical follow up  Cecilia's symptoms improved over the hospital course  Initially after admission she was still feeling depressed, anxious and overwhelmed  With adjustment of medications and therapeutic milieu her symptoms gradually improved  Patient signed a 72 hour notice voluntarily withdrawing themselves from treatment  Though Sahrita Duong likely would have benefited from additional inpatient care, she was unwilling to rescind her 67 hour notice and was not demonstrating 302 behavior  At the end of treatment Sharita Duong was improved  Her mood was more stable at the time of discharge  Judsam Duong denied suicidal ideation, intent or plan at the time of discharge and denied homicidal ideation, intent or plan at the time of discharge  Sharita Duong was now remorseful about suicide attempt and had more hope for the future  Sharita Duong was participating appropriately in milieu at the time of discharge  Behavior was appropriate on the unit at the time of discharge  Sleep and appetite were improved  Sharita Duong was tolerating medications and was not reporting any significant side effects at the time of discharge  Cecilia signed 72 hour notice and requested discharge  At the time of the 72 hour notice expiration Sharita Duong had no criteria for involuntary commitment and denied any suicidal or homicidal ideation  Patient was attending to all ADLs, taking medications appropriately, eating meals, and not demonstrating any clinical stigmata of acute psychosis  At the time of discharge, they were goal oriented, forward thinking and optimistic about the future  The outpatient follow up with [facility] at [address] [place] [address] E was arranged by the unit  upon discharge      Mental Status at Time of Discharge:     Appearance: casually dressed, appears consistent with stated age, normal grooming  Motor: no psychomotor disturbances, no gait abnormalities  Behavior: calm, cooperative, interacts with this writer appropriately, still limited eye contact  Speech: normal rate, rhythm, and volume  Mood: "good" less depressed  Affect: appropriate, not as constricted, mood-congruent  Thought Process: organized, linear, and goal-oriented; intact associations  Thought Content: denies any delusional material, no preoccupation  Perception: denies any auditory or visual hallucinations, denies other perceptual disturbances  Risk Potential: denies suicidal ideation, plan, or intent  Denies homicidal ideation  Sensorium: Oriented to person, place, time, and situation  Cognition: cognitive ability appears intact but was not quantitatively tested  Consciousness: alert and awake  Attention/Concentration: able to focus without difficulty, attention and concentration are age appropriate  Insight: improved  Judgement: improved    Admission Diagnosis:    Principal Problem:    Suicide attempt [facility])      Discharge Diagnosis:     Principal Problem:    Suicide attempt [facility])  Resolved Problems:    Medical clearance for psychiatric admission      Lab Results:   I have personally reviewed all pertinent laboratory/tests results    Most Recent Labs:   Lab Results   Component Value Date    WBC 9 07 10/09/2022    RBC 4 12 10/09/2022    HGB 11 2 (L) 10/09/2022    HCT 35 2 10/09/2022     10/09/2022    RDW 13 3 10/09/2022    NEUTROABS 5 84 10/09/2022    SODIUM 139 10/09/2022    K 3 4 (L) 10/09/2022     10/09/2022    CO2 26 10/09/2022    BUN 11 10/09/2022    CREATININE 0 75 10/09/2022    GLUC 78 10/09/2022    GLUF 62 (L) 10/06/2022    CALCIUM 9 1 10/09/2022    AST 23 10/09/2022    ALT 51 10/09/2022    ALKPHOS 114 10/09/2022    TP 7 4 10/09/2022    ALB 3 5 10/09/2022    TBILI 0 30 10/09/2022    CHOLESTEROL 153 10/09/2022    HDL 38 (L) 10/09/2022    TRIG 100 10/09/2022    LDLCALC 95 10/09/2022    NONHDLC 115 10/09/2022    JSD2CCLEPRIL 2 653 10/09/2022    PREGUR negative 10/05/2022    PREGSERUM Negative 10/08/2022    RPR Non-Reactive 10/08/2022    HGBA1C 5 5 10/09/2022     10/09/2022       Discharge Medications:    See after visit summary for all reconciled discharge medications provided to patient and family  Discharge instructions/Information to patient and family:     See after visit summary for information provided to patient and family  Provisions for Follow-Up Care:    See after visit summary for information related to follow-up care and any pertinent home health orders  Discharge Statement:    I spent 35 minutes discharging the patient  This time was spent on the day of discharge  I had direct contact with the patient on the day of discharge  Additional documentation is required if more than 30 minutes were spent on discharge:    I reviewed with Cecilia importance of compliance with medications and outpatient treatment after discharge  I discussed the medication regimen and possible side effects of the medications with Cecilia prior to discharge  At the time of discharge she was tolerating psychiatric medications  I discussed outpatient follow up with Nikita Fonseca  I reviewed with Cecilia crisis plan and safety plan upon discharge  Nikita Fonseca was competent to understand risks and benefits of withholding information and risks and benefits of her actions  Cecilia signed 72 hour notice and requested discharge  At the time of the 72 hour notice expiration she had no criteria for involuntary commitment and denied any suicidal or homicidal ideation      Discharge on Two Antipsychotic Medications: No    Mariposa Maldonado PA-C 10/12/22

## 2022-10-11 NOTE — PROGRESS NOTES
Progress Note - Behavioral Health     Tish AyonSuri 28 y o  female MRN: [id number]   Unit/Bed#: U 261-01 Encounter: [id number]    Behavior over the last 24 hours: some improvement  Kash Escudero is a 35-year-old female with a history of depression status post suicide attempt via intentional Benadryl overdose who presents for psychiatric follow-up  Staff reports no behavioral issues overnight  She signed a 72 hour notice due to  tomorrow morning  She was complaining of daytime sedation, so her Effexor was switched to bedtime  She remains focused on discharge  She describes her mood as “much better,” but her affect is constricted and incongruent  Eye contact is limited  When asked about her suicide attempt, she describes it as impulsive and she regrets her actions  When asked how she can prevent something like this from happening again in the future, her responses “remove all the ingredients, I guess? If I did not have the bottle of Benadryl then maybe I would have taken all those pills ”  Seemingly lacks insight but adamantly denying any current thoughts of self-harm  No suicidal ideation, intent or plan  Remains goal oriented and focused on the future, excited to get back to her children and spend time with her family  Says she feels a little less depressed than when she 1st came in  No HI and no psychotic symptoms  Unwilling to rescind her 72 hour notice  Has not had a bowel movement since she has been on the unit  Sleep: normal  Appetite: normal  Medication side effects: Yes - constipation   ROS: all other systems are negative    Mental Status Evaluation:    Appearance:  age appropriate, casually dressed, adequate grooming, tattooed   Behavior:  cooperative, calm, somewhat guarded, limited eye contact   Speech:  normal rate and volume   Mood:  "I feel better   I'm not as sad"   Affect:  constricted, mood-incongruent   Thought Process:  organized, goal directed, linear Associations: intact associations   Thought Content:  no overt delusions   Perceptual Disturbances: no auditory hallucinations, no visual hallucinations, does not appear responding to internal stimuli   Risk Potential: Suicidal ideation - None at present, status post suicide attempt by overdose on Benadryl, remorseful now, contracts for safety on the unit, would talk to staff if not feeling safe on the unit  Homicidal ideation - None at present  Potential for aggression - No   Sensorium:  oriented to person, place and time/date   Memory:  recent and remote memory grossly intact   Consciousness:  alert and awake   Attention/Concentration: attention span and concentration appear shorter than expected for age   Insight:  limited   Judgment: limited   Gait/Station: normal gait/station   Motor Activity: no abnormal movements     Vital signs in last 24 hours:    Temp:  [97 9 °F (36 6 °C)-98 1 °F (36 7 °C)] 98 1 °F (36 7 °C)  HR:  [80-83] 80  Resp:  [16-18] 16  BP: (106-115)/(59-74) 106/59    Laboratory results: I have personally reviewed all pertinent laboratory/tests results    Results from the past 24 hours: No results found for this or any previous visit (from the past 24 hour(s))    Most Recent Labs:   Lab Results   Component Value Date    WBC 9 07 10/09/2022    RBC 4 12 10/09/2022    HGB 11 2 (L) 10/09/2022    HCT 35 2 10/09/2022     10/09/2022    RDW 13 3 10/09/2022    NEUTROABS 5 84 10/09/2022    SODIUM 139 10/09/2022    K 3 4 (L) 10/09/2022     10/09/2022    CO2 26 10/09/2022    BUN 11 10/09/2022    CREATININE 0 75 10/09/2022    GLUC 78 10/09/2022    CALCIUM 9 1 10/09/2022    AST 23 10/09/2022    ALT 51 10/09/2022    ALKPHOS 114 10/09/2022    TP 7 4 10/09/2022    ALB 3 5 10/09/2022    TBILI 0 30 10/09/2022    CHOLESTEROL 153 10/09/2022    HDL 38 (L) 10/09/2022    TRIG 100 10/09/2022    LDLCALC 95 10/09/2022    NONHDLC 115 10/09/2022    MOO0IWMKLKQW 2 653 10/09/2022    PREGUR negative 10/05/2022 PREGSERUM Negative 10/08/2022    RPR Non-Reactive 10/08/2022    HGBA1C 5 5 10/09/2022     10/09/2022       Progress Toward Goals: progressing, working on coping skills, discharge planning    Assessment/Plan   Principal Problem:    Suicide attempt [facility])  Active Problems:    Medical clearance for psychiatric admission      Recommended Treatment:     Planned medication and treatment changes: All current active medications have been reviewed  Encourage group therapy, milieu therapy and occupational therapy  Behavioral Health checks every 7 minutes    Continue current medications  Discharge planning for tomorrow prior to expiration of 72 hour notice  Has not demonstrated 302 behaviors since arriving to the unit, though ideally, she would spend more time in treatment    Case management working on a partial program referral     Current Facility-Administered Medications   Medication Dose Route Frequency Provider Last Rate   • acetaminophen  650 mg Oral Q6H PRN Murl Song, JC     • acetaminophen  650 mg Oral Q4H PRN Murl Song, JC     • acetaminophen  975 mg Oral Q6H PRN Murl SongJC     • aluminum-magnesium hydroxide-simethicone  30 mL Oral Q4H PRN Murl SongJC     • artificial tear  1 application Both Eyes W0S PRN Murl Song, JC     • benztropine  1 mg Intramuscular Q4H PRN Max 6/day Murl SongJC     • benztropine  1 mg Oral Q4H PRN Max 6/day Murl SongJC     • bisacodyl  10 mg Rectal Daily PRN Murl SongJC     • hydrOXYzine HCL  50 mg Oral Q6H PRN Max 4/day Murl SongJC      Or   • diphenhydrAMINE  50 mg Intramuscular Q6H PRN Murl SongJC     • hydrOXYzine HCL  100 mg Oral Q6H PRN Max 4/day Murl SongJC      Or   • LORazepam  2 mg Intramuscular Q6H PRN Murl SongJC     • hydrOXYzine HCL  25 mg Oral Q6H PRN Max 4/day Murl SongJC     • melatonin  3 mg Oral HS PRN Murl Song, JC     • OLANZapine 5 mg Oral Q4H PRN Max 3/day Robe Moctezuma PA-C      Or   • OLANZapine  2 5 mg Intramuscular Q4H PRN Max 3/day Robe Moctezuma PA-C     • OLANZapine  5 mg Oral Q3H PRN Max 3/day Robe Moctezuma PA-C      Or   • OLANZapine  5 mg Intramuscular Q3H PRN Max 3/day Robe Moctezuma PA-C     • OLANZapine  2 5 mg Oral Q4H PRN Max 6/day Robe Moctezuma PA-C     • polyethylene glycol  17 g Oral Daily Angelika Gill, [place]     • senna-docusate sodium  1 tablet Oral HS Angelika Little PA-C     • venlafaxine  225 mg Oral HS Tenzin Eastman MD       Risks / Benefits of Treatment:    Risks, benefits, and possible side effects of medications explained to patient and patient verbalizes understanding and agreement for treatment  Counseling / Coordination of Care:    Patient's progress discussed with staff in treatment team meeting  Medications, treatment progress and treatment plan reviewed with patient      Robe Moctezuma PA-C 10/11/22

## 2022-10-11 NOTE — NURSING NOTE
Awake and alert  Pleasant upon approach  Currently sitting in bed working on a [employer]  Denies SI/HI  Improved mood  Compliant with medications, denies side effects  Restful sleep but hot overnight  Discussed healthy coping skills: enjoys meditating and music  Hopeful for discharge Wednesday

## 2022-10-11 NOTE — BH TRANSITION RECORD
Contact Information: If you have any questions, concerns, pended studies, tests and/or procedures, or emergencies regarding your inpatient behavioral health visit  Please contact [place] behavioral [facility] unit [phone] and ask to speak to a , nurse or physician  A contact is available 24 hours/ 7 days a week at this number  Summary of Procedures Performed During your Stay:  Below is a list of major procedures performed during your hospital stay and a summary of results:  - Cardiac Procedures/Studies: ekg  - Major Imaging Studies: ct head wo contrast     Pending Studies (From admission, onward)    None        Please follow up on the above pending studies with your PCP and/or referring provider

## 2022-10-11 NOTE — CASE MANAGEMENT
CM contacted [facility] to let them know that pt will dc tomorrow and is interested in PHP and asked for a response of when she could begin PHP

## 2022-10-11 NOTE — CASE MANAGEMENT
CM met with pt to check in about dc plans for tomorrow  Pt reported she is feeling ready for dc  CM spoke to pt about [facility] and she reported that she is interested in attending  Pt reported that she could get her kids off to school and then go to [facility]  Pt reported that her  can pick her up tomorrow  CM reminded her that her 72 hour notice expires on 10/12/22 @ 9:30am so she will need to be picked up before then  Pt verbalized understanding

## 2022-10-11 NOTE — CASE MANAGEMENT
CM received response from [facility] and they reported that pt can start intake for IN PERSON PHP on Friday 10/14/22      "Nida Velazquez has been scheduled to start in-person PHP intakes on 10/14 Friday 8 30 am at [address] [place] [address] ([address]: [phone]) and to start the group on Monday 9am to 2pm at the same location "

## 2022-10-11 NOTE — NURSING NOTE
Patient appears to have slept overnight, without periods of restlessness observed  Patient remains asleep currently  Q7 minute observational rounds maintained for patient safety

## 2022-10-12 VITALS
DIASTOLIC BLOOD PRESSURE: 79 MMHG | HEART RATE: 93 BPM | RESPIRATION RATE: 18 BRPM | OXYGEN SATURATION: 98 % | SYSTOLIC BLOOD PRESSURE: 113 MMHG | WEIGHT: 198.41 LBS | BODY MASS INDEX: 36.51 KG/M2 | HEIGHT: 62 IN | TEMPERATURE: 98.3 F

## 2022-10-12 PROBLEM — F33.9 RECURRENT MAJOR DEPRESSIVE DISORDER (HCC): Status: ACTIVE | Noted: 2022-10-12

## 2022-10-12 PROBLEM — F32.81 PMDD (PREMENSTRUAL DYSPHORIC DISORDER): Status: ACTIVE | Noted: 2022-10-12

## 2022-10-12 PROBLEM — Z78.9 ALCOHOL USE: Status: ACTIVE | Noted: 2022-10-12

## 2022-10-12 PROCEDURE — 99239 HOSP IP/OBS DSCHRG MGMT >30: CPT | Performed by: STUDENT IN AN ORGANIZED HEALTH CARE EDUCATION/TRAINING PROGRAM

## 2022-10-12 RX ORDER — VENLAFAXINE HYDROCHLORIDE 150 MG/1
150 CAPSULE, EXTENDED RELEASE ORAL DAILY
Qty: 30 CAPSULE | Refills: 0 | Status: SHIPPED | OUTPATIENT
Start: 2022-10-12 | End: 2022-10-28

## 2022-10-12 RX ORDER — VENLAFAXINE HYDROCHLORIDE 75 MG/1
75 CAPSULE, EXTENDED RELEASE ORAL DAILY
Qty: 30 CAPSULE | Refills: 0 | Status: SHIPPED | OUTPATIENT
Start: 2022-10-12 | End: 2022-10-28

## 2022-10-12 NOTE — PROGRESS NOTES
New admission - 201 from [facility] post OD on Benedryl  Pt scant, drowsy  Reported feeling of anxiety and feeling like a prisoner  Pt denies SI and reported she misses her children  DC: pt signed 72 hour notice on 10/9/22 @ 9:30am      10/10/22 0311   Team Meeting   Meeting Type Daily Rounds   Team Members Present   Team Members Present Physician;Nurse;; Other (Discipline and Name)   Physician Team Member Dr Kira Hawkins / Jaziel Cruz / Dino Purdy Team Member Jeff Ross / Andrew Mejia Management Team Member Marilyn Plascencia / Claire Cummings   Other (Discipline and Name) Vince Diaz - Art Therapy   Patient/Family Present   Patient Present No   Patient's Family Present No

## 2022-10-12 NOTE — NURSING NOTE
Awake, sitting in bed reading  Expressed readiness for discharge  Reports spouse is a support and will pick her up for transport at 0900  Denies SI/HI or thoughts of self harm  Mood continues to improve  Restful sleep  Discussed healthy coping skills  Encouraged to follow up with OP appointments and medications upon discharge  No questions or concerns

## 2022-10-12 NOTE — PLAN OF CARE
Problem: SELF HARM/SUICIDALITY  Goal: Will have no self-injury during hospital stay  Description: INTERVENTIONS:  - Q 15 MINUTES: Routine safety checks  - Q WAKING SHIFT & PRN: Assess risk to determine if routine checks are adequate to maintain patient safety  - Encourage patient to participate actively in care by formulating a plan to combat response to suicidal ideation, identify supports and resources  Outcome: Adequate for Discharge     Problem: DEPRESSION  Goal: Will be euthymic at discharge  Description: INTERVENTIONS:  - Administer medication as ordered  - Provide emotional support via 1:1 interaction with staff  - Encourage involvement in milieu/groups/activities  - Monitor for social isolation  Outcome: Adequate for Discharge     Problem: ANXIETY  Goal: Will report anxiety at manageable levels  Description: INTERVENTIONS:  - Administer medication as ordered  - Teach and encourage coping skills  - Provide emotional support  - Assess patient/family for anxiety and ability to cope  Outcome: Adequate for Discharge  Goal: By discharge: Patient will verbalize 2 strategies to deal with anxiety  Description: Interventions:  - Identify any obvious source/trigger to anxiety  - Staff will assist patient in applying identified coping technique/skills  - Encourage attendance of scheduled groups and activities  Outcome: Adequate for Discharge     Problem: SLEEP DISTURBANCE  Goal: Will exhibit normal sleeping pattern  Description: Interventions:  -  Assess the patients sleep pattern, noting recent changes  - Administer medication as ordered  - Decrease environmental stimuli, including noise, as appropriate during the night  - Encourage the patient to actively participate in unit groups and or exercise during the day to enhance ability to achieve adequate sleep at night  - Assess the patient, in the morning, encouraging a description of sleep experience  Outcome: Adequate for Discharge     Problem: SELF CARE DEFICIT  Goal: Return ADL status to a safe level of function  Description: INTERVENTIONS:  - Administer medication as ordered  - Assess ADL deficits and provide assistive devices as needed  - Obtain PT/OT consults as needed  - Assist and instruct patient to increase activity and self care as tolerated  Outcome: Adequate for Discharge     Problem: DISCHARGE PLANNING  Goal: Discharge to home or other facility with appropriate resources  Description: INTERVENTIONS:  - Identify barriers to discharge w/patient and caregiver  - Arrange for needed discharge resources and transportation as appropriate  - Identify discharge learning needs (meds, wound care, etc )  - Arrange for interpretive services to assist at discharge as needed  - Refer to Case Management Department for coordinating discharge planning if the patient needs post-hospital services based on physician/advanced practitioner order or complex needs related to functional status, cognitive ability, or social support system  Outcome: Adequate for Discharge     Problem: Ineffective Coping  Goal: Participates in unit activities  Description: Interventions:  - Provide therapeutic environment   - Provide required programming   - Redirect inappropriate behaviors   Outcome: Adequate for Discharge     Expressed readiness for discharge

## 2022-10-12 NOTE — CASE MANAGEMENT
CM met with pt to check in, pt smiling upon approach and reported feeling ready for dc home today  Pt  will pick her up at 9am  (Pt signed 72 hour notice on 10/9/22 @ 9:30am      Pt scheduled for in person [facility] starting Friday 10/14/22

## 2022-10-12 NOTE — PROGRESS NOTES
Pt denies SI, seclusive, focused on dc, scant in conversation  DC: Wednesday (?) pt signed 72 hour notice on 10/9/22 @ 9:30am      10/11/22 0751   Team Meeting   Meeting Type Daily Rounds   Team Members Present   Team Members Present Physician;Nurse;; Other (Discipline and Name)   Physician Team Member Dr Filiberto Travis / Alayna Brown / Angelica [place] Team Member Turner Phelan / Lopez Bailey Management Team Member Yocasta Owusu   Other (Discipline and Name) Iman Payneding - Art Therapy   Patient/Family Present   Patient Present No   Patient's Family Present No

## 2022-10-12 NOTE — PROGRESS NOTES
Pt scant in conversation, isolates in her room  Reported mood has improved and reported feeling ready for dc home today  Pt  will pick pt today at [address]: Today at [address] [facility] - pt to begin In person [facility] Friday 10/14/22      10/12/22 0843   Team Meeting   Meeting Type Daily Rounds   Team Members Present   Team Members Present Physician;Nurse;; Other (Discipline and Name)   Physician Team Member Dr Kira Hawkins / Jaziel Cruz / Dino Purdy Team Member Jeff Ross / Andrew Mejia Management Team Member Marilyn Plascencia / Ross Davis   Patient/Family Present   Patient Present No   Patient's Family Present No

## 2022-10-14 ENCOUNTER — OFFICE VISIT (OUTPATIENT)
Dept: PSYCHIATRY | Facility: CLINIC | Age: 32
End: 2022-10-14
Payer: COMMERCIAL

## 2022-10-14 ENCOUNTER — OFFICE VISIT (OUTPATIENT)
Dept: PSYCHOLOGY | Facility: CLINIC | Age: 32
End: 2022-10-14
Payer: COMMERCIAL

## 2022-10-14 VITALS
BODY MASS INDEX: 36.95 KG/M2 | RESPIRATION RATE: 16 BRPM | HEIGHT: 62 IN | SYSTOLIC BLOOD PRESSURE: 108 MMHG | DIASTOLIC BLOOD PRESSURE: 73 MMHG | HEART RATE: 89 BPM | WEIGHT: 200.8 LBS

## 2022-10-14 DIAGNOSIS — F33.2 SEVERE EPISODE OF RECURRENT MAJOR DEPRESSIVE DISORDER, WITHOUT PSYCHOTIC FEATURES (HCC): Primary | ICD-10-CM

## 2022-10-14 DIAGNOSIS — F32.81 PMDD (PREMENSTRUAL DYSPHORIC DISORDER): ICD-10-CM

## 2022-10-14 PROCEDURE — 90791 PSYCH DIAGNOSTIC EVALUATION: CPT

## 2022-10-14 PROCEDURE — 90792 PSYCH DIAG EVAL W/MED SRVCS: CPT | Performed by: PSYCHIATRY & NEUROLOGY
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
## 2022-10-14 NOTE — BH TREATMENT PLAN
Assessment/Plan:      Diagnoses and all orders for this visit:    Severe episode of recurrent major depressive disorder, without psychotic features ([facility] [address] )    PMDD (premenstrual dysphoric disorder)          Subjective:     Patient ID: Hermilo Cano is a 28 y o  female  [facility] Treatment Plan   AREAS OF NEED: Hermilo Cano has experienced worsening depression symptoms as evidenced by suicidal ideation (no plan or intent currently, however, 1 week ago she engaged in a plan and action to complete it), weight loss, decreased appetite, difficulty concentrating, decreased motivation and energy, difficulty complete activities of daily living, and decreased interest in completing pleasurable activities due to relationship stress with boyfriend, natural parenting stressors, unmanaged PMDD symptoms, and school       As per Hermilo Cano- "I know what I have to do, but the thought of beginning it, I find very stressful "     Date Initiated: 10/14/22  Strengths:  Determined, open-minded, understanding, patient, and a problem-solver      LONG TERM GOAL:   Date Initiated: 10/14/22  1 0 By the end of program, I will identify 3 ways my mental health has improved, 3 ways I am able to follow or maintain a schedule, and 3 coping skills that assist in maintaining my mental health  Target Date: 11/11/22  Completion Date:       SHORT TERM OBJECTIVES:     Date Initiated: 10/14/22  1 1 On a daily basis, I will complete 2 manageable activities of daily living (1 load of laundry, clean dishes, etc) and 1 pleasurable activity for at least 15 minutes to begin completing and following a schedule  Revision Date:   Target Date: 10/26/22  Completion Date:     Date Initiated: 10/14/22  1 2 By the end of program, I will learn at least 3 mindfulness or grounding strategies that will assist me in decreasing my emotional intensity and assist me in remaining within the present moment  Revision Date:   Target Date: 10/26/22  Completion Date:    Date Initiated: 10/14/22  1 3 I will take medications as prescribed and share questions and concerns if arise  Revision Date:  Target Date: 10/26/22  Completion Date:     Date Initiated: 10/14/22  1 4 I will identify 3 ways my supports can assist in my recovery and agree to staff/support contact as indicated  Revision Date:  Target Date: 10/26/22  Completion Date:          7 DAY REVISION:    Date Initiated:  Revision Date:   Target Date:   Completion Date:      PSYCHIATRY:  Date Initiated:  10/14/22  Medication Management and Education       Revision Date:   The person(s) responsible for carrying out the plan is Destinee Mckeon MD    NURSING:   Date Initiated: 10/14/22  1 1,1 2,1 3,1 4 This therapist will provide wellness/symptoms and skill education groups three to five days weekly to educate Tristan Zepeda on signs and symptoms of diagnoses, skills to manage, and medication questions that will be addressed by the treatment team     Revision Date:  The person(s) responsible for carrying out the plan is Vini Marsh MS     PSYCHOLOGY:   Date Initiated: 10/14/22       1 1, 1 2, 1 4 Provide psychotherapy group 5 times per week to allow opportunity for Tristan Zepeda  to explore stressors and ways of coping  Revision Date:   The person(s) responsible for carrying out the plan is Silvana Davila MS    ALLIED THERAPY:   Date Initiated: 10/14/22  1 1,1 2 Engage Tristan Zepeda in AT group 5 times per week to encourage development and use of wellness tools to decrease symptoms and promote recovery through meaningful activity    Revision Date:   The person(s) responsible for carrying out the plan is MORENA Gallardo and MORENA Keith    CASE MANAGEMENT:   Date Initiated: 10/14/22      1 0 This  will meet with Nataliia Juarez  3-4 times weekly to assess treatment progress, discharge planning, connection to community supports and UR as indicated  Revision Date:   The person(s) responsible for carrying out the plan is MORENA Keith    TREATMENT REVIEW/COMMENTS:     DISCHARGE CRITERIA: Identify 3 signs of progress and complete relapse prevention plan  DISCHARGE PLAN: Connect with outpatient providers  Estimated Length of Stay: 10 treatment days      Diagnosis and Treatment Plan explained to Jeff Christy relates understanding diagnosis and is agreeable to Treatment Plan  CLIENT COMMENTS / Please share your thoughts, feelings, need and/or experiences regarding your treatment plan with Staff  Please see follow up note with comments  Signatures can be found on [facility] Treatment plan consent form

## 2022-10-14 NOTE — PSYCH
Subjective:     Patient ID: Lavern Lu is a 28 y o  female  [facility] Clinical Progress Notes      Specialized Services Documentation  Therapist must complete separate progress note for each specific clinical activity in which the individual participated during the day  Other  Paperwork faxed to [facility]     Case Management Note  [id number]- VC met with Lavern Lu  Reviewed program structure, expectations and was given on call number and crisis phone numbers  Lavern Lu completed releases and OP providers/ PCP notified of admission and health care coordination form completed  Completed initial psycho-social evaluation and initial treatment goals discussed  MORENA España    Current suicide risk : Low     Medications changes/added/denied? No    Treatment session number: 0    Individual Case Management Visit provided today? Yes     [facility] follow up physician's orders: Admit to PHP  Read Dr Jreica Musa psychiatric evaluation

## 2022-10-17 ENCOUNTER — OFFICE VISIT (OUTPATIENT)
Dept: PSYCHOLOGY | Facility: CLINIC | Age: 32
End: 2022-10-17
Payer: COMMERCIAL

## 2022-10-17 DIAGNOSIS — F33.2 SEVERE EPISODE OF RECURRENT MAJOR DEPRESSIVE DISORDER, WITHOUT PSYCHOTIC FEATURES (HCC): Primary | ICD-10-CM

## 2022-10-17 DIAGNOSIS — F32.81 PMDD (PREMENSTRUAL DYSPHORIC DISORDER): ICD-10-CM

## 2022-10-17 PROCEDURE — H0035 MH PARTIAL HOSP TX UNDER 24H: HCPCS

## 2022-10-17 NOTE — PSYCH
Subjective:     Patient ID: Shilpa David is a 28 y o  female  [facility] Clinical Progress Notes      Specialized Services Documentation  Therapist must complete separate progress note for each specific clinical activity in which the individual participated during the day  Group Psychotherapy    [id number] Shilpa David participated actively in an educational group on boundaries  The group focused on the interpersonal effectiveness pillar of DBT; specifically, looking at the [place] [id number]  Participants followed a step by step breakdown of the [facility] process and were encouraged to engage in open discussion throughout  Group members were given a [facility] practice worksheet to independently complete  Jordyn Juarez voluntarily read aloud, maintained eye-contact, took notes, and completed the worksheet  good  progress noted towards goals  Continue with psychotherapy to encourage further development of interpersonal effectiveness skills      Tx Plan Objective 1 1, 1 2, 1 4 Therapist: KRYSTAL Quick

## 2022-10-17 NOTE — PSYCH
Subjective:     Patient ID: Patrick Dias is a 28 y o  female  [facility] Clinical Progress Notes      Specialized Services Documentation  Therapist must complete separate progress note for each specific clinical activity in which the individual participated during the day  Allied Therapy   [id number]- Patrick Dias actively shared and participated in [facility] group focused on learning about what archetypes are, the purpose of survival archetypes,  the dark and light qualities of each archetype, and how each survival archetype relates to their life  Group focused on the victim and saboteur archetype today  Group explored archetypes through farzad analysis, active discussion, and utilizing guided worksheets  Patrick Dias  identified several limiting beliefs within the group setting and frequently shared insight within the group discussion  When not verbally participating, she presented as receptive to conversation as observed by body language and facial affect  Some effort noted toward treatment goal   Continue AT to encourage the development and practice of utilizing their guided worksheet of questions to help them move from the dark or shadow qualities of an archetype to the light qualities  Tx Plan Objective: 1 1, 1 2, & 1 4        Therapist:  MORENA Cross     Education Therapy   [id number] Patrick Dias actively shared in morning assessment  Presented as Receptive related to readiness to learn  Patrick Dias observed goal review only as it was her first day in treatment  did not present with any barriers to learning  Tx Plan Objective: 1 1, 1 2, 1 4  Therapist:  MORENA Cross       Case Management Note  [id number]- CM met with Mayte Brooks and CM reviewed initial tx plan  Mayte Rodarte had no questions or concerns regarding her tx plan or programming       MORENA Cross    Current suicide risk : Low    Medications changes/added/denied? No    Treatment session number: 1    Individual Case Management Visit provided today? Yes    [facility] follow up physician's orders: Continue to follow orders

## 2022-10-17 NOTE — PSYCH
Subjective:     Patient ID: Davis Saldivar is a 28 y o  female  [facility] Clinical Progress Notes      Specialized Services Documentation  Therapist must complete separate progress note for each specific clinical activity in which the individual participated during the day  Group Psychotherapy ([id number])    Today's group was centered around Emotional Freedom Tapping, a technique for altering/lessening negative emotions (backed by studies, patient testimony, and traditional chinese medicine)  The group learned what EFT is and in what ways it can be effective  Then, members learned/discussed the step by step process of EFT (in great detail)  Members practiced the EFT process together multiple times until the group felt comfortable doing it on their own after program today  Additionally, the group was given handouts which detailed ETF and how to do it  Davis Saldivar contributed to this group by practicing along with every session of EFT and by taking notes  Nohemi Haywood is making progress towards their goals  Continue Psychotherapy to encourage further exploration of the self  Tx Plan Objective: 1 1,1 2, 1 4   Therapist: Arturo Ram MS    Education Therapy     [phone] Jennifer Juarez engaged throughout the treatment day  Was engaged in learning related to Illness, Medication, Aftercare and Wellness Tools  Staff utilized Verbal, Written, A/V and Demonstration teaching methods  Jennifer Juarez shared area of learning and set a goal for outside of program to continue catching up with school and to clean her room        Tx Plan Objective: 1 1,1 2, 1 4   Therapist: Arturo Ram MS

## 2022-10-18 ENCOUNTER — OFFICE VISIT (OUTPATIENT)
Dept: PSYCHOLOGY | Facility: CLINIC | Age: 32
End: 2022-10-18
Payer: COMMERCIAL

## 2022-10-18 DIAGNOSIS — F32.81 PMDD (PREMENSTRUAL DYSPHORIC DISORDER): ICD-10-CM

## 2022-10-18 DIAGNOSIS — F33.2 SEVERE EPISODE OF RECURRENT MAJOR DEPRESSIVE DISORDER, WITHOUT PSYCHOTIC FEATURES (HCC): Primary | ICD-10-CM

## 2022-10-18 PROCEDURE — H0035 MH PARTIAL HOSP TX UNDER 24H: HCPCS

## 2022-10-18 NOTE — PSYCH
Subjective:     Patient ID: Elaezar Mohr is a 28 y o  female  [facility] Clinical Progress Notes      Specialized Services Documentation  Therapist must complete separate progress note for each specific clinical activity in which the individual participated during the day  Group Psychotherapy    0930-1030 Eleazar Mohr actively participated in a group focused on reframing language  Group members received an informational worksheet that covered the STOP skill for distress tolerance  The group went through several exercises covering rhetorical questions and ineffective vs effective communication  All members were given slips of paper to submit anonymous statements they have used before, and the group interpreted and reframed them together  Tristan Juarez openly verbally engaged throughout the entire group, shared how she gets a feeling of "butterflies" in her body when her anger is intensifying, as well as offered peer support  good  progress noted towards goal  Continue with psychotherapy to work focus on positive language and effective communication       Tx Plan Objective 1 1, 1 2, 1 4 Therapist: Hardy Woodall

## 2022-10-18 NOTE — PSYCH
Subjective:     Patient ID: Dorita Newman is a 28 y o  female  [facility] Clinical Progress Notes      Specialized Services Documentation  Therapist must complete separate progress note for each specific clinical activity in which the individual participated during the day  Psychotherapeutic Group ([id number])    The group learned and discussed what happens in the body and mind during sleep, the consequences of poor sleep, things that cause poor sleep, and ways/skills to get to sleep/stay asleep  They shared methods that work, methods that don't, and learned new ones  We reviewed the stages of sleep in detail, in addition to multiple sleep hygiene handouts and sleep diaries  There were multiple handouts and worksheets in addition to white board content  This group contained a large amount of discussion and sharing  Phivazquezs Kary Juarez shared actively and contributed to the group by taking notes, sharing her own knowledge of different sleep methods (aromatherapy, brown noises, etc)  Trupti Pretty is actively working towards goals  Continue psychotherapy groups to encourage further exploration of needs, personal awareness, and skills      Tx Plan Objective: 1 1,1 2, 1 4   Therapist: Patti Azar MS

## 2022-10-18 NOTE — PSYCH
Subjective:     Patient ID: Samuel Woods is a 28 y o  female  [facility] Clinical Progress Notes      Specialized Services Documentation  Therapist must complete separate progress note for each specific clinical activity in which the individual participated during the day  Allied Therapy   5846-0220- Group covered self-sabotaging behaviors and how to overcome them by challenging limited beliefs with limitless beliefs  Clients learned the definition and origin of the word Sabotage  Discussion was lead first through farzad analysis of the song "Don't Let Me Get Me" by Edwin  Clients identified phrases which related to their own lives and experiences  Discussion was then opened up to naming personal self-sabotaging behaviors and identifying underlying fears that propel them  Clients split into groups to challenge one limited belief with three corresponding limitless beliefs  Samuel Woods actively participated in discussion by agreeing and identifying with other group members  She also named personal self-sabotaging behaviors and underlying fears that cause them  Continue AT to encourage the development and awareness of archetypes to alleviate symptoms and support wellness  Therapist: Norman Logan MTI and Joseph Escobar MT-BC    Education Therapy   1232-3054 Samuel Woods actively shared in morning assessment and goal review  Presented as Receptive related to readiness to learn  Samuel Woods did not complete goal from last treatment day, but changed goal after leaving program and identified gaining Support  did not present with any barriers to learning  [phone] Diana Juarez engaged throughout the treatment day  Was engaged in learning related to Illness, Medication, Aftercare and Wellness Tools  Staff utilized Verbal, Written, A/V and Demonstration teaching methods    Diana Juarez shared area of learning and set a goal for outside of program to carve pumpkins with her kids, do a load of laundry, and spend quality time with a friend        Tx Plan Objective: 1 1, 1 2, 1 4, Therapist:  JOSE ROBERTO Lal and Aron Aase, MT-BC

## 2022-10-18 NOTE — PSYCH
Subjective:     Patient ID: Darleen Nick is a 28 y o  female  [facility] Clinical Progress Notes      Specialized Services Documentation  Therapist must complete separate progress note for each specific clinical activity in which the individual participated during the day  Case Management Note  [id number]- CM and Cecilia Isaac discussed that she has anxiety regarding whether or not she should visit her mother and sister from out of state as she feels a lot of pressure placed on her to visit them  She fears the pushback that may occur if she does decide to go from her partner  Portia Isaac was encouraged to create a pros and cons list regarding this decision  Portia Isaac has attempted to set boundaries with her partner  Portia Isaac believes it was ineffective, however, after having an effective coping skills group, Edouardsandeep Carmel realizes that she will be utilizing the effective communication skills to express her needs with her partner  Dasha Loredo, [facility]    Current suicide risk : Low     Medications changes/added/denied? No    Treatment session number: 2    Individual Case Management Visit provided today?  Yes     [facility] follow up physician's orders: Continue to follow orders

## 2022-10-19 ENCOUNTER — OFFICE VISIT (OUTPATIENT)
Dept: PSYCHOLOGY | Facility: CLINIC | Age: 32
End: 2022-10-19
Payer: COMMERCIAL

## 2022-10-19 DIAGNOSIS — F33.2 SEVERE EPISODE OF RECURRENT MAJOR DEPRESSIVE DISORDER, WITHOUT PSYCHOTIC FEATURES (HCC): Primary | ICD-10-CM

## 2022-10-19 DIAGNOSIS — F32.81 PMDD (PREMENSTRUAL DYSPHORIC DISORDER): ICD-10-CM

## 2022-10-19 PROCEDURE — H0035 MH PARTIAL HOSP TX UNDER 24H: HCPCS

## 2022-10-19 NOTE — PSYCH
Subjective:     Patient ID: Ajay Bowden is a 28 y o  female  [facility] Clinical Progress Notes      Specialized Services Documentation  Therapist must complete separate progress note for each specific clinical activity in which the individual participated during the day  Education Therapy   8690-0527 Patience Juarez engaged throughout the treatment day  Was engaged in learning related to Illness, Medication, Aftercare and Wellness Tools  Staff utilized Verbal, Written, A/V and Demonstration teaching methods  Patience Juarez shared area of learning and set a goal for outside of program to wash a load of laundry  Tx Plan Objective: 1 1,1 2,1 4 Therapist:  Annette Weeks MS    GROUP PSYCHOTHERAPY (0512-4532)   The group engaged in an activity on problem-solving by making positive, healthy versus negative, unhealthy choices that included skill-building in naming a current problem (large or small) that needs to be solved, list the alternatives, list resources, analyze the alternatives, make a decision, and evaluate the decision  The group provided feedback about their identified, current problem and a positive, healthy solution  Nikita Fonseca demonstrated progress towards goals and objectives through group participation  Continue with psychotherapy    TX Plan Objectives: 1 1, 1 2, 1 4   Therapist: Annette Weeks MS

## 2022-10-19 NOTE — PSYCH
Subjective:     Patient ID: Dao Vickers is a 28 y o  female  [facility] Clinical Progress Notes      Specialized Services Documentation  Therapist must complete separate progress note for each specific clinical activity in which the individual participated during the day  Psychotherapeutic Group (2722-3164)    The group learned about the causes of dissatisfaction and using gratitude as an antidote  The group gained a further understanding of feelings of dissatisfaction through a short video and discussions  They identified solutions to these feelings by doing writing exercises, practice gratitude journal, and discussion  The group also learned new skills to achieve a larger sense of gratitude  Dao Vickers actively participated in group by watching the video, contributing in discussions, writing, reading a [employer] gratitude blessing, and reviewing a worksheet  Vishal Greer shared her gratitude list in addition to volunteering to share multiple other insights and anecdotes (which were helpful and promoted discussion)  Vishal Greer  is working towards goals  Continue psychotherapy groups to encourage further exploration of needs, personal awareness, and skills        Tx Plan Objective: 1 1, 1 2, 1 4 Therapist: Jaron Lugo MS

## 2022-10-19 NOTE — PSYCH
Subjective:     Patient ID: Laurita Villafuerte is a 28 y o  female  [facility] Clinical Progress Notes      Specialized Services Documentation  Therapist must complete separate progress note for each specific clinical activity in which the individual participated during the day  Group Psychotherapy   0930-1030 Laurita Villafuerte  actively shared in psychotherapy group focused on self- care  Pritchett Mor was encouraged to identify aspects of self-care and barriers to it  The concept of self -care versus selfishness explored  Group reinforced the necessity of self -care in wellness versus seeing this as a luxury and tips to increase self-care  She engaged in object meditation  She identified they would engage in an activity she enjoys for self-care tonight  Some positive progress voiced toward goal   Continue psychotherapy to engage in the development and practice of wellness tools      Tx Plan Objective: 1 1, Therapist:  Oswaldo BERG

## 2022-10-19 NOTE — PSYCH
Subjective:     Patient ID: Elizabeth Khan is a 28 y o  female  [facility] Clinical Progress Notes      Specialized Services Documentation  Therapist must complete separate progress note for each specific clinical activity in which the individual participated during the day  Case Management Note  CM did not meet with Elizabeth Khan today as it was not a scheduled meeting day and Elizabeth Khan  did not request to meet with CM  CM will meet with Zach Juarez  on next treatment day  MORENA Randolph    Current suicide risk : Low     Medications changes/added/denied? No    Treatment session number: 3    Individual Case Management Visit provided today? No    [facility] follow up physician's orders: Continue to follow orders

## 2022-10-19 NOTE — PSYCH
Subjective:     Patient ID: Jose Jacome is a 28 y o  female  [facility] Clinical Progress Notes      Specialized Services Documentation  Therapist must complete separate progress note for each specific clinical activity in which the individual participated during the day  Education Therapy   0746-9782 Jose Jacome actively shared in morning assessment and goal review  Presented as Receptive related to readiness to learn  Jose Jacome did complete goal from last treatment day identifying gaining responsibility, hope, and support  Did not present with any barriers to learning       Tx Plan Objective: 1 1, 1 2, 1 4, Therapist:  LIGIA Kumar

## 2022-10-20 ENCOUNTER — OFFICE VISIT (OUTPATIENT)
Dept: PSYCHOLOGY | Facility: CLINIC | Age: 32
End: 2022-10-20
Payer: COMMERCIAL

## 2022-10-20 DIAGNOSIS — F33.2 SEVERE EPISODE OF RECURRENT MAJOR DEPRESSIVE DISORDER, WITHOUT PSYCHOTIC FEATURES (HCC): Primary | ICD-10-CM

## 2022-10-20 PROCEDURE — H0035 MH PARTIAL HOSP TX UNDER 24H: HCPCS

## 2022-10-20 NOTE — PSYCH
Subjective:     Patient ID: Shay Arteaga is a 28 y o  female  [facility] Clinical Progress Notes      Specialized Services Documentation  Therapist must complete separate progress note for each specific clinical activity in which the individual participated during the day  Group Psychotherapy    [id number] Shay Arteaga participated actively in a psychoeducational group focused on self-care  The group was received and reviewed the Self-Care Wheel by [employer]  Participants then independently completed a self-care assessment and shared reflections  The group also received and reviewed DBT handouts on ACCEPTS and self-soothing with the five senses  While going through each section, group members voluntarily shared and discussed personal ideas, experiences, and recommendations as they correlated  Participants received and independently completed a self-care plan  Yan AyonZionvahid shared that she uses music and the movie [place] to change her mood, and that she would like to begin taking walks for self-care  good  progress noted towards goals  Continue with psychotherapy to further strengthen ability to take care of self      Tx Plan Objective 1 1, 1 2, 1 4 Therapist: KRYSTAL Santoro

## 2022-10-20 NOTE — PSYCH
Subjective:     Patient ID: Haylee Pastor is a 28 y o  female  [facility] Clinical Progress Notes      Specialized Services Documentation  Therapist must complete separate progress note for each specific clinical activity in which the individual participated during the day  Group Psychotherapy   [phone] Haylee Pastor actively shared in psychotherapy group focused on radical acceptance and the concept of “letting go”  During group discussion on “letting go”, Morelia Green participated in group reading and was able to voice relationship to the topic and meaningfully relate  Group reinforced importance of consistently caring for one’s self and use of strategies explored to refocus to the present  Progressive Muscle Relaxation shared and practiced  Some positive progress toward goal   Continue psychotherapy to increase self awareness and use of skills consistently       Tx Plan Objective: 1 1,1 2, Therapist:  [facility] MT-BC

## 2022-10-20 NOTE — PSYCH
Subjective:     Patient ID: Shay Arteaga is a 28 y o  female  [facility] Clinical Progress Notes      Specialized Services Documentation  Therapist must complete separate progress note for each specific clinical activity in which the individual participated during the day  Allied Therapy   [id number] -- Shay Arteaga actively participated in [facility] group that focused on the topic of turning limited beliefs related to their mental health wellness journey into limitless beliefs (beliefs that empower us and assist us in reaching full potential)  Group engaged in creating a chord progression, song lyrics related to farzad analysis, group discussion, and active music making to a steady beat  Yan Beatris Juarez engaged in teamwork with peers by sharing her opinion with others and assisting in others in sharing their opinions  Some effort noted toward treatment goal  Continue AT to encourage the development and practicing using limitless beliefs through an accessible tool such as a song  Tx Objective: 1 1, 1 2, & 1 4    Therapist Signature: Bandar Webster, [facility]           Education Therapy   [id number] Shay Arteaga actively shared in morning assessment and goal review  Presented as Receptive related to readiness to learn  Shay Arteaga did complete goal from last treatment day identifying gaining responsibility, support, and hope  did not present with any barriers to learning  [id number] Yan Beatris Juarez engaged throughout the treatment day  Was engaged in learning related to Illness, Medication, Aftercare and Wellness Tools  Staff utilized Verbal, Written, A/V and Demonstration teaching methods  Yan Sutherlandmonroe Juarez shared area of learning and set a goal for outside of program to email her advisor, submit school assignments, and draw for self-care        Tx Plan Objective: 1 1, 1 2, 1 4  Therapist: ARACELIS MontesBC      Case Management Note   [id number]- CM met with Cecilia Vera shared that she believes she wants to leave her partner  She is beginning to assess her values and believes she does not want to be with him any longer as she is not happy  She shared she wants to be an example for her children and knows by remaining with her partner she is not setting a good example  MORENA Montes    Current suicide risk : Low     Medications changes/added/denied? No    Treatment session number: 16    Individual Case Management Visit provided today? Yes     [facility]ations follow up physician's orders: Continue to follow orders

## 2022-10-21 ENCOUNTER — OFFICE VISIT (OUTPATIENT)
Dept: PSYCHOLOGY | Facility: CLINIC | Age: 32
End: 2022-10-21
Payer: COMMERCIAL

## 2022-10-21 ENCOUNTER — OFFICE VISIT (OUTPATIENT)
Dept: PSYCHIATRY | Facility: CLINIC | Age: 32
End: 2022-10-21
Payer: COMMERCIAL

## 2022-10-21 DIAGNOSIS — F32.81 PMDD (PREMENSTRUAL DYSPHORIC DISORDER): ICD-10-CM

## 2022-10-21 DIAGNOSIS — F33.2 SEVERE EPISODE OF RECURRENT MAJOR DEPRESSIVE DISORDER, WITHOUT PSYCHOTIC FEATURES (HCC): ICD-10-CM

## 2022-10-21 DIAGNOSIS — F33.2 SEVERE EPISODE OF RECURRENT MAJOR DEPRESSIVE DISORDER, WITHOUT PSYCHOTIC FEATURES (HCC): Primary | ICD-10-CM

## 2022-10-21 DIAGNOSIS — F32.81 PMDD (PREMENSTRUAL DYSPHORIC DISORDER): Primary | ICD-10-CM

## 2022-10-21 PROBLEM — F41.1 GAD (GENERALIZED ANXIETY DISORDER): Status: ACTIVE | Noted: 2022-10-21

## 2022-10-21 PROBLEM — F41.1 GAD (GENERALIZED ANXIETY DISORDER): Status: RESOLVED | Noted: 2022-10-21 | Resolved: 2022-10-21

## 2022-10-21 PROBLEM — F41.9 ANXIETY: Status: ACTIVE | Noted: 2022-10-21

## 2022-10-21 PROCEDURE — H0035 MH PARTIAL HOSP TX UNDER 24H: HCPCS

## 2022-10-21 PROCEDURE — 99213 OFFICE O/P EST LOW 20 MIN: CPT | Performed by: NURSE PRACTITIONER

## 2022-10-21 NOTE — PSYCH
Subjective:     Patient ID: Yael Thurman is a 28 y o  female  [facility] Clinical Progress Notes      Specialized Services Documentation  Therapist must complete separate progress note for each specific clinical activity in which the individual participated during the day  Group Psychotherapy    [id number]  Yael Thurman actively listened and participated in psychotherapy, psychoeducational group focused on self-esteem/self-worth and the correlation between behavior and values (values reflecting real self), as well as when behavior conflicts with values it leads to shame (guilt)  Hand-out of positive values was provided with exercise to rate 1-5 the most important value (1) with lesser degree of value but still important (5)  Group rated their values and shared their identified values with ratings:  an exciting life, money and possessions, a job well-done, close family relationship, friendship, independence, good health, calmness, love, concern for the environment, physical attractiveness, sense of humor, spirituality, security, respect and admiration from others, and sobriety  Hand-out of an inspirational poem fostering positive/healthy self-esteem/self-worth for group was provided to take home to read to reflect  At the end of group, the Fan Compliment exercise was done and then a check-out as to where the group was at via brief, individual sharings of present mental status  Progress toward goal good as evidenced by active participation and willingness to share in group  Continue psychotherapy, psychoeducation to encourage self-awareness and exploration of challenging emotions      Tx Plan Objective:  1 1, 1 2, 1 4         Therapist: Zoila Shields MS

## 2022-10-21 NOTE — PSYCH
Subjective:     Patient ID: Chyna Dobbins is a 28 y o  female  [facility] Clinical Progress Notes      Specialized Services Documentation  Therapist must complete separate progress note for each specific clinical activity in which the individual participated during the day  Case Management Note  CM did not meet with Chyna Dobbins today as it was not a scheduled meeting day and Chyna Dobbins  did not request to meet with CM  CM will meet with Hayde Juarez  on next treatment day  MORENA Lan    Current suicide risk : Low     Medications changes/added/denied? No    Treatment session number: 5    Individual Case Management Visit provided today? Yes     [facility]ations follow up physician's orders: Continue to follow orders

## 2022-10-21 NOTE — PSYCH
Subjective:    Patient ID: Raul Marquez is a 28 y o  female      [facility] Clinical Progress Notes      Specialized Services Documentation  Therapist must complete separate progress note for each specific clinical activity in which the individual participated during the day  Education Therapy   [id number]  Raul Marquez actively shared in check in and goal review  Presented as Receptive related to readiness to learn  Raul Marquez  did complete goal from last treatment day identifying gaining hope, advocacy, and support  did not present with any barriers to learning  [id number]  Lawyer Amanda WillsMeyvahid engaged throughout the treatment day  Was engaged in learning related to Illness, Medication, Aftercare and Wellness Tools  Staff utilized Verbal, Written, A/V and Demonstration teaching methods  Lawyer Amanda Juarez shared area of learning and set a goal for outside of program to do her school work, clean, attend to email, and take her twins to [employer]  Tx Plan Objective: 1 1, 1 2, 1 4, Therapist: Citlali Montero    Group Psychotherapy    [id number]  Raul Marquez participated actively in a psychoeducational group focused on behavioral activation  The group explored documents which detailed and displayed examples of how our negative events, feelings/moods, and behaviors can self-perpetuate in a cycle  Members took time to independently identify the steps in a personal cycle revolving around negative behaviors  Followed by this, the same activity was completed, but this time with a positive behavior  Members voluntarily shared results and reflections  The group was then given time to generate a list of pleasurable activities they have previously enjoyed but since discontinued  The same was done for new activities to try after seeing examples   For both categories of pleasurable activities the group was instructed on how to create realistic, actionable steps towards reintegrating or introducing these activities into their lives, as well as problem-solving in advance for any potential obstacles or setbacks  The group was provided with information and resources for activity planning / mood tracking, as well as tips for success  Trung Parents shared about a negative emotional-behavioral cycle stemming from arguments with her partner; she also shared that she would like to reintegrate fashion sketching  good  progress noted towards goals  Continue with psychotherapy to encourage further encouragement towards positive activities       Tx Plan Objective 1 1, 1 2, 1 4 Therapist: KRYSTAL Jara

## 2022-10-21 NOTE — PSYCH
Visit Time    Visit Start Time: 10:38 AM  Visit Stop Time: 10:53 AM  Total Visit Duration: 15 minutes   This note was not shared with the patient due to reasonable likelihood of causing patient harm   PHP MEDICATION MANAGEMENT NOTE        Anthony Jimenez    Name and Date of Birth:  Tristan Juarez 28 y o  1990 MRN: [id number]    Date of Visit: October 21, 2022    No Known Allergies  SUBJECTIVE:    Vazquez Whalen is seen today for a follow up for Major Depressive Disorder and Generalized Anxiety Disorder  She reports that she has done fairly well since the last visit  Patient reports she has found the group setting helpful  Working on effective communication and goal setting  Denies feelings of depression anxiety over the past week  States feelings of depression typically occur week prior to her menstrual cycle  Discussed patient's history of intrusive suicidal thoughts and past habit of gathering supplies as part of a plan to commit suicide  Patient encouraged to identify resources and supports that she can reach out to she finds herself experiencing suicidal ideation, intrusive suicidal thoughts or considering gathering implements for suicide  Patient reports she does have the crisis hotline phone number  Identifies mother and friends as supports that she can call  Patient encouraged to begin making phone calls to schedule individual therapy and outpatient psychiatry appointments  She denies any side effects from medications  PLAN:  Continue Effexor  mg p o   Daily  Aware of 24 hour and weekend coverage for urgent situations accessed by calling [facility] main practice number  Continue partial hospitalization program    Diagnoses and all orders for this visit:    PMDD (premenstrual dysphoric disorder)    Severe episode of recurrent major depressive disorder, without [facility]        Current Outpatient Medications on File Prior to Visit   Medication Sig Dispense Refill   • venlafaxine (EFFEXOR-XR) 150 mg 24 hr capsule Take 1 capsule (150 mg total) by mouth daily Take one 150mg capsule with one 75mg capsule at bedtime for total bedtime dose of 225mg  30 capsule 0   • venlafaxine (EFFEXOR-XR) 75 mg 24 hr capsule Take 1 capsule (75 mg total) by mouth daily Take one 150mg capsule with one 75mg capsule at bedtime for total bedtime dose of 225mg  30 capsule 0     No current facility-administered medications on file prior to visit  Psychotherapy Provided:     Individual psychotherapy provided: Yes  Counseling was provided during the session today for 16 minutes  Supportive counseling provided  HPI ROS Appetite Changes and Sleep:     She reports normal sleep, normal appetite, adequate energy level   Denies homicidal ideation, denies suicidal ideation    Review Of Systems:      General emotional problems, decreased functioning and relationship problems   Personality no change in personality   Constitutional negative   ENT negative   Cardiovascular negative   Respiratory negative   Gastrointestinal negative   Genitourinary negative   Musculoskeletal negative   Integumentary negative   Neurological negative   Endocrine negative   Other Symptoms none, all other systems are negative     Mental Status Evaluation:    Appearance Adequate hygiene and grooming   Behavior calm and cooperative   Mood euthymic  Depression Scale -  of 10 (0 = No depression)  Anxiety Scale -  of 10 (0 = No anxiety)   Speech Normal rate and volume   Affect mood-congruent   Thought Processes Goal directed and coherent   Thought Content Does not verbalize delusional material   Associations Tightly connected   Perceptual Disturbances Denies hallucinations and does not appear to be responding to internal stimuli   Risk Potential Suicidal/Homicidal Ideation - No evidence of suicidal or homicidal ideation and patient does not verbalize suicidal or homicidal ideation  Risk of Violence - No evidence of risk for violence found on assessment  Risk of Self Mutilation - No evidence of risk for self mutilation found on assessment   Orientation oriented to person, place, time/date and situation   Memory recent and remote memory grossly intact   Consciousness alert and awake   Attention/Concentration attention span and concentration are age appropriate   Insight fair   Judgement fair and improved   Muscle Strength and Gait normal muscle strength and normal muscle tone, normal gait/station and normal balance   Motor Activity no abnormal movements   Language no difficulty naming common objects, no difficulty repeating a phrase, no difficulty writing a sentence   Fund of Knowledge adequate knowledge of current events  adequate fund of knowledge regarding past history  adequate fund of knowledge regarding vocabulary      Past Psychiatric History Update:     Inpatient Psychiatric Admission Since Last Encounter:   no  Suicide Attempt Or Self Mutilation Since Last Encounter:   no  Incidence of Violent Behavior Since Last Encounter:   no    Traumatic History Update:     New Onset of Abuse Since Last Encounter:   no  Traumatic Events Since Last Encounter:   no    Past Medical History:    Past Medical History:   Diagnosis Date   • Anxiety    • Depression         Past Surgical History:   Procedure Laterality Date   • SALPINGECTOMY       No Known Allergies  Substance Abuse History:    Social History     Substance and Sexual Activity   Alcohol Use Not Currently   • Alcohol/week: 10 0 standard drinks   • Types: 10 Glasses of wine per week    Comment: every other weekend, last time 10/5/22     Social History     Substance and Sexual Activity   Drug Use Never     Social History:    Social History     Socioeconomic History   • Marital status: Single     Spouse name: Not on file   • Number of children: 5   • Years of education: college student   • Highest education level: Not on file   Occupational History   • Not on file   Tobacco Use   • Smoking status: Never Smoker   • Smokeless tobacco: Never Used   Vaping Use   • Vaping Use: Never used   Substance and Sexual Activity   • Alcohol use: Not Currently     Alcohol/week: 10 0 standard drinks     Types: 10 Glasses of wine per week     Comment: every other weekend, last time 10/5/22   • Drug use: Never   • Sexual activity: Not on file   Other Topics Concern   • Not on file   Social History Narrative   • Not on file     Social Determinants of Health     Financial Resource Strain: Not on file   Food Insecurity: Not on file   Transportation Needs: Not on file   Physical Activity: Not on file   Stress: Not on file   Social Connections: Not on file   Intimate Partner Violence: Not on file   Housing Stability: Not on file     Family Psychiatric History:     Family History   Problem Relation Age of Onset   • No Known Problems Mother    • No Known Problems Father    • Drug abuse Cousin    • Alcohol abuse Cousin    • Psychiatric Illness Neg Hx      History Review: The following portions of the patient's history were reviewed and updated as appropriate: allergies, current medications, past family history, past medical history, past social history, past surgical history and problem list     OBJECTIVE:     Vital signs in last 24 hours: There were no vitals filed for this visit  Laboratory Results: I have personally reviewed all pertinent laboratory/tests results  Medications Risks/Benefits:      Risks, Benefits And Possible Side Effects Of Medications:    Discussed risks and benefits of treatment with patient including risk of suicidality, serotonin syndrome, increased QTc interval and SIADH related to treatment with antidepressants;  Risk of induction of manic symptoms in certain patient populations     Controlled Medication Discussion:     Not applicable    STEVIE Akins 10/21/22

## 2022-10-21 NOTE — PSYCH
Subjective:     Patient ID: Ta Wing is a 28 y o  female  [facility] Clinical Progress Notes      Specialized Services Documentation  Therapist must complete separate progress note for each specific clinical activity in which the individual participated during the day  Group Psychotherapy ([id number])    The group engaged in the wellness assessment, which evaluates progress on several different areas of wellness/wellbeing: physical, emotional, cognitive, vocational, social and spiritual  Clients rated their progress and discussed areas that need work  By completing and discussing areas of progress and challenges, members are connected and reminded that, in their mental health struggle, they are not alone  Topics of discussion revolved around positive experiences within each area of wellness as well as the challenging aspects to wellness within their past week  Ta Wing continues to make progress towards goals through participation in group activity and personal disclosures  Continue psychotherapy groups to encourage further exploration of needs, personal awareness, and skills       Tx Plan Objective: 1 1,1 2, 1 4   Therapist: Hannah Singletary MS

## 2022-10-24 ENCOUNTER — OFFICE VISIT (OUTPATIENT)
Dept: PSYCHOLOGY | Facility: CLINIC | Age: 32
End: 2022-10-24
Payer: COMMERCIAL

## 2022-10-24 DIAGNOSIS — F33.2 SEVERE EPISODE OF RECURRENT MAJOR DEPRESSIVE DISORDER, WITHOUT PSYCHOTIC FEATURES (HCC): Primary | ICD-10-CM

## 2022-10-24 PROCEDURE — H0035 MH PARTIAL HOSP TX UNDER 24H: HCPCS

## 2022-10-24 NOTE — PSYCH
Subjective:     Patient ID: Yovany Schneider is a 28 y o  female  [facility] Clinical Progress Notes      Specialized Services Documentation  Therapist must complete separate progress note for each specific clinical activity in which the individual participated during the day  Group Psychotherapy    [id number]  Yovany Schneider actively listened in processing, psychotherapy group focused on gains made in program, future gains wanted to be made, and how the [facility] program can support the group  Group encouraged participation and exploration of open, topic discussion  Grounding/mindfulness exercise completed at the end of group to bring group back to center  Jasperjigar Valle Larry shared about how she is learning to love and accept herself with the unconditional love from her children who provide her with hope in group; progress toward goal good as she presented as actively listening and engaged  Continue psychotherapy to encourage self-awareness and exploration      Tx Goals 1 1, 1 2, 1 4         Therapist: Kishan Lopez MS

## 2022-10-24 NOTE — PSYCH
Subjective:    Patient ID: Misael Echevarria is a 28 y o  female      [facility] Clinical Progress Notes      Specialized Services Documentation  Therapist must complete separate progress note for each specific clinical activity in which the individual participated during the day  Education Therapy   [id number]  Misael Echevarria actively shared in check in and goal review  Presented as Receptive related to readiness to learn  Misael Echevarria  did complete goal from last treatment day identifying gaining hope, advocacy, responsibility, and support  did not present with any barriers to learning  [id number]  Wilfred Juarez engaged throughout the treatment day  Was engaged in learning related to Illness, Medication, Aftercare and Wellness Tools  Staff utilized Verbal, Written, A/V and Demonstration teaching methods  Wilfred AyonZionvahid shared area of learning and set a goal for outside of program to take care of her children  Tx Plan Objective: 1 1, 1 2, 1 4, Therapist: Rodríguez Batres [place]    Group Psychotherapy    [id number] Misael Echevarria participated actively in a psychoeducational group focused on SMART goals  At the beginning of the group, participants were given slips of paper to anonymously write general goals on, which were folded and collected in a bin  The importance of SMART goals was explained as a handout with specific information on each part of the acronyms and DIY spaces next to them was distributed  Volunteers read aloud the individual letters and examples were provided  On printed paper, each letter from SMART with brief definitions were placed on the board in order   Group members were divided into small teams with which to complete the main activity: the submitted goals were read aloud, one at a time, and each group was given five note cards to turn that goal into S/M/A/R/T goals as a team  After time for deliberation and completion, group members took turns reading aloud their answers for each letter, taking turns with other groups around the room  Answers were discussed then collected and put on the board under their corresponding letters  At the end of the group, participants were given time and instruction to fill out their SMART goals worksheet with a personal goal of choice  Paul Juarez shared that one first step they can take towards accomplishing part of their SMART goal is make / find time  good  progress noted towards goals  Continue with psychotherapy to further increase likelihood of success in goal setting       Tx Plan Objective 1 1, 1 2, 1 4     Therapist: KRYSTAL Thomas

## 2022-10-24 NOTE — PSYCH
Subjective:     Patient ID: Lauri Pereira is a 28 y o  female  [facility] Clinical Progress Notes      Specialized Services Documentation  Therapist must complete separate progress note for each specific clinical activity in which the individual participated during the day  Allied Therapy   [id number]-- Tasha Juarez attended [facility] group focused on learning about non-negotiables and how to apply their non-negotiables to creating smart goals  Group explored non-negotiables through an active music making opportunity and reflecting on mindfulness regarding what they saw, heard, or felt while playing  Once the group established how they would or not be treated by others, the group rated themselves on how they treat themselves in that capacity  "I want others to effectively communicate"-> "I am a effective communicator " They rated the second statement if they never, sometimes, or always live up to that non-negotiable  If they rated as a never or sometimes, they identified 2 areas in their life that they need to improve within that non-negotiable and the first smart goal toward improving it  Geraldo Cuadra left group at 1 PM today due to her child needing to attend a doctor appointment  Geraldo Cuadra did not receive information regarding non-negotiables, however, prior to leaving she participated in the active music making experience  Some effort noted toward treatment goal   Continue AT to encourage the development and practice of utilizing their guided worksheet of questions to help them move from the dark or shadow qualities of an archetype to the light qualities  Tx Plan Objective: 1 1, 1 2, & 1 4      Therapist:  MORENA Edmond      Case Management Note  JUMANA did not meet with Tasha Juarez today as it was not a scheduled meeting day and Lauri Pereira  did not request to meet with JUMANA   CM will meet with Tasha Juarez  on next treatment day  Naeem Fuentes, [facility]    Current suicide risk : Low     Medications changes/added/denied? No    Treatment session number: 7    Individual Case Management Visit provided today? Yes     Innovations follow up physician's orders: Continue to follow orders

## 2022-10-25 ENCOUNTER — OFFICE VISIT (OUTPATIENT)
Dept: PSYCHOLOGY | Facility: CLINIC | Age: 32
End: 2022-10-25
Payer: COMMERCIAL

## 2022-10-25 DIAGNOSIS — F33.2 SEVERE EPISODE OF RECURRENT MAJOR DEPRESSIVE DISORDER, WITHOUT PSYCHOTIC FEATURES (HCC): Primary | ICD-10-CM

## 2022-10-25 DIAGNOSIS — F32.81 PMDD (PREMENSTRUAL DYSPHORIC DISORDER): ICD-10-CM

## 2022-10-25 PROCEDURE — H0035 MH PARTIAL HOSP TX UNDER 24H: HCPCS

## 2022-10-25 NOTE — PSYCH
Subjective:     Patient ID: Corey Tubbs is a 28 y o  female  [facility] Clinical Progress Notes      Specialized Services Documentation  Therapist must complete separate progress note for each specific clinical activity in which the individual participated during the day  Case Management Note  [id number]- CM met with Cecilia Calles reported that overall she is doing better and continues to focus on: self-care, setting boundaries with her children and loved ones, effective communication, and completing ADLs  Ricky Calles would like to continue to work on these in her upcoming treatment review as she expressed worries regarding her PMDD symptoms and maintaining these goal areas  List of OP therapy providers given today  MORENA Clark    Current suicide risk : Low     Medications changes/added/denied? No    Treatment session number: 7    Individual Case Management Visit provided today? Yes     Innovations follow up physician's orders: Continue to follow orders

## 2022-10-25 NOTE — PSYCH
Subjective:     Patient ID: Yovany Schneider is a 28 y o  female  [facility] Clinical Progress Notes      Specialized Services Documentation  Therapist must complete separate progress note for each specific clinical activity in which the individual participated during the day  Group Psychotherapy   [id number] Yovany Schneider  actively shared in psychotherapy group focused on DBT Module Interpersonal Effectiveness and [facility] skill  Group engaged in tasks exploring what makes it difficult to share and strategies to improve with supports  She participated in discussion related to communication roadblocks  She was an active participant as group worked together to practice writing out a meaningful interaction using [facility]  Some progress toward goal noted  Continue psychotherapy to encourage sharing, personal advocacy and practice of wellness tools         Tx Plan Objective: 1 1,1 2, Therapist:  Karlos BERG

## 2022-10-25 NOTE — PSYCH
Subjective:     Patient ID: Corey Tubbs is a 28 y o  female  [facility] Clinical Progress Notes      Specialized Services Documentation  Therapist must complete separate progress note for each specific clinical activity in which the individual participated during the day  GROUP PSYCHOTHERAPY ([id number])   The group was provided psychoeducation on healthy vs unhealthy coping strategies  The group engaged in a healthy vs unhealthy coping strategies activity by identifying the following:  describe a problem they are dealing with, their unhealthy coping strategies, their consequences of unhealthy strategies; healthy coping strategies they use or could use; expected outcomes of healthy coping strategies; and barriers to using healthy coping strategies  The group was provided the opportunity to share after they finished the activity  Corey Tubbs demonstrated progress towards goals and objectives through group participation  Continue with psychotherapy    Tx Plan Objectives: 1 1, 1 2, 1 4   Therapist: Florence Anderson MS

## 2022-10-25 NOTE — PSYCH
Subjective:    Patient ID: Daniele Odell is a 28 y o  female      [facility] Clinical Progress Notes      Specialized Services Documentation  Therapist must complete separate progress note for each specific clinical activity in which the individual participated during the day  Education Therapy   [id number]  Daniele Odell actively shared in check in and goal review  Presented as Receptive related to readiness to learn  Daniele Odell  did complete goal from last treatment day identifying gaining hope, responsibility, and support  did not present with any barriers to learning  [id number]  Nida Juarez engaged throughout the treatment day  Was engaged in learning related to Illness, Medication, Aftercare and Wellness Tools  Staff utilized Verbal, Written, A/V and Demonstration teaching methods  Nida Juarez shared area of learning and set a goal for outside of program to put away clothing, organize her table, list out her assignments for school  Tx Plan Objective: 1 1, 1 2, 1 4, Therapist: Rachael Weiner [facility] Psychotherapy    [id number] Daniele Odell actively participated in an educational group about fair fighting  The group reflected on associations and experiences they have with conflict and fighting  Members learned the three principles of fair fighting and explored the benefits of healthy conflict resolution  Participants were given the opportunity to discuss interpersonal disputes that have escalated disproportionately, cycles of damaging arguments, and personal patterns towards conflict  The group members were given a handout of nine fair fighting rules that led to more group discussion and sharing  Nida Juarez shared that one rule they can reflect on or practice this week is "attempt to come to a compromise or understanding"  good  progress noted towards treatment goals  Continue with psychotherapy to encourage further exploration conflict resolution       Tx Plan Objective 1 1, 1 2, 1 4 Therapist: KRYSTAL Woodson

## 2022-10-26 ENCOUNTER — OFFICE VISIT (OUTPATIENT)
Dept: PSYCHOLOGY | Facility: CLINIC | Age: 32
End: 2022-10-26
Payer: COMMERCIAL

## 2022-10-26 DIAGNOSIS — F33.2 SEVERE EPISODE OF RECURRENT MAJOR DEPRESSIVE DISORDER, WITHOUT PSYCHOTIC FEATURES (HCC): Primary | ICD-10-CM

## 2022-10-26 DIAGNOSIS — F32.81 PMDD (PREMENSTRUAL DYSPHORIC DISORDER): ICD-10-CM

## 2022-10-26 PROCEDURE — H0035 MH PARTIAL HOSP TX UNDER 24H: HCPCS

## 2022-10-26 NOTE — PSYCH
Subjective:    Patient ID: Kailey Escalante is a 28 y o  female      [facility] Clinical Progress Notes      Specialized Services Documentation  Therapist must complete separate progress note for each specific clinical activity in which the individual participated during the day  Education Therapy   [id number]  Kailey Escalante actively shared in check in and goal review  Presented as Receptive related to readiness to learn  Kaliey Escalante  did complete goal from last treatment day identifying gaining support and responsibility  did not present with any barriers to learning  [id number]  Moi Juarez engaged throughout the treatment day  Was engaged in learning related to Illness, Medication, Aftercare and Wellness Tools  Staff utilized Verbal, Written, A/V and Demonstration teaching methods  Lynettecisco Juarez shared area of learning and set a goal for outside of program to check out support groups  Tx Plan Objective: 1 1, 1 2, 1 4, Therapist: Citlali Edouard    Group Psychotherapy    [id number] Kailey Escalante participated actively in a psychoeducational group focused on codependency  Group members voluntarily read, reflected on, and openly discussed educational material on codependency, containing: who is affected, dysfunctional families, codependent behaviors and characteristics, a questionnaire to identify signs of and steps to move beyond codependency  Participants also received a list of reflections for outside of programming  Moi Juarez shared that the task they can focus on this week is "Pick one area where you are willing to say no or speak up about what will work for you " Good progress noted towards goals  Continue with psychotherapy to encourage further understanding of codependency       Tx Plan Objective: 1 1, 1 2, 1 4    Therapist: KRYSTAL Edouard

## 2022-10-26 NOTE — PSYCH
Subjective:     Patient ID: Misael Echevarria is a 28 y o  female  [facility] Clinical Progress Notes      Specialized Services Documentation  Therapist must complete separate progress note for each specific clinical activity in which the individual participated during the day  ALLIED THERAPY ([id number])  CPS Tom Thompson shared her life story as she co-led this session  The guest speaker presentation about KHALIF encouraged group members to reflect on their mental health journey  regarding the power of learning about self, accepting illness, and personal responsibility in recovery  ALLAN Dawkins reviewed community resources in addition to personal resources like positive affirmations and an acronym she uses to remind her about what tools she should use on a daily basis to maintain mental health wellness  Wilfred Juarez  attentively listened to Mariza's presentation  Progress toward goals noted  Continue AT to encourage self -awareness and healthy engagement of supports  TX Plan Objectives: 1 1, 1 2, 1 4   Therapist: MORENA Oliva & ALLAN CUADRA       Case Management Note  CM did not meet with Misael Echevarria today as it was not a scheduled meeting day and Misael Echevarria  did not request to meet with CM  CM will meet with Wilfred Juarez  on next treatment day  MORENA Oliva    Current suicide risk : Low     Medications changes/added/denied? No    Treatment session number: 8    Individual Case Management Visit provided today? Yes     [facility] follow up physician's orders: Continue to follow orders

## 2022-10-26 NOTE — PSYCH
Subjective:     Patient ID: Jorge Ochoa is a 28 y o  female  [facility] Clinical Progress Notes      Specialized Services Documentation  Therapist must complete separate progress note for each specific clinical activity in which the individual participated during the day  GROUP PSYCHOTHERAPY ([id number])   The group was provided psychoeducation on the three styles of boundaries (porous, rigid, and healthy) and the 5 best boundary setting exercises with how to implement (1  Know your rights, 2  Find your core values, 3  Establish your boundaries, 4  Set your boundaries, 5  Maintain your boundaries)  The group then engaged in a boundary exploration activity  The group was provided the opportunity to process and share after they finished the activity  Jorge Ochoa demonstrated progress towards goals and objectives through group participation  Continue with psychotherapy    Tx Plan Objectives: 1 1, 1 2, 1 4   Therapist: Perla Noriega MS

## 2022-10-27 ENCOUNTER — OFFICE VISIT (OUTPATIENT)
Dept: PSYCHOLOGY | Facility: CLINIC | Age: 32
End: 2022-10-27
Payer: COMMERCIAL

## 2022-10-27 DIAGNOSIS — F32.81 PMDD (PREMENSTRUAL DYSPHORIC DISORDER): ICD-10-CM

## 2022-10-27 DIAGNOSIS — F33.2 SEVERE EPISODE OF RECURRENT MAJOR DEPRESSIVE DISORDER, WITHOUT PSYCHOTIC FEATURES (HCC): Primary | ICD-10-CM

## 2022-10-27 PROCEDURE — H0035 MH PARTIAL HOSP TX UNDER 24H: HCPCS

## 2022-10-27 NOTE — PSYCH
Subjective:     Patient ID: Kat Rendon is a 28 y o  female  [facility] Clinical Progress Notes      Specialized Services Documentation  Therapist must complete separate progress note for each specific clinical activity in which the individual participated during the day  Group Psychotherapy   [id number]  Kat Rendon actively shared in psychotherapy group focused on distress tolerance skill “self-soothe”  She was observed to be engaged in therapist led visualization  Group discussed specific items that could help self soothe if in an “anxiety crisis” with encouragement to use these things regularly to manage stressors consistently  She identified she would put sore candy  in  a “self soothe kit”  Positive effort noted toward tx goal   Continue psychotherapy to encourage development of wellness skills and consistent practice         Tx Plan Objective: 1 1, Therapist:  Ketan BERG

## 2022-10-27 NOTE — BH TREATMENT PLAN
Assessment/Plan:      Diagnoses and all orders for this visit:    Severe episode of recurrent major depressive disorder, without psychotic features ([facility] [address] )    PMDD (premenstrual dysphoric disorder)          Subjective:     Patient ID: Shant James is a 28 y o  female  Assessment/Plan:       Diagnoses and all orders for this visit:     Severe episode of recurrent major depressive disorder, without psychotic features ([address] )     PMDD (premenstrual dysphoric disorder)            Subjective:      Patient ID: Shant James is a 28 y o  female      [facility] Treatment Plan   AREAS OF NEED: Shant James has experienced worsening depression symptoms as evidenced by suicidal ideation (no plan or intent currently, however, 1 week ago she engaged in a plan and action to complete it), weight loss, decreased appetite, difficulty concentrating, decreased motivation and energy, difficulty complete activities of daily living, and decreased interest in completing pleasurable activities due to relationship stress with boyfriend, natural parenting stressors, unmanaged PMDD symptoms, and school       As per Cecilia Juarez- "I know what I have to do, but the thought of beginning it, I find very stressful "      Date Initiated: 10/14/22  Strengths:  Determined, open-minded, understanding, patient, and a problem-solver               LONG TERM GOAL:   Date Initiated: 10/14/22  1 0 By the end of program, I will identify 3 ways my mental health has improved, 3 ways I am able to follow or maintain a schedule, and 3 coping skills that assist in maintaining my mental health     Target Date: 11/11/22  Completion Date:         SHORT TERM OBJECTIVES:      Date Initiated: 10/14/22  1 1 On a daily basis, I will complete 2 manageable activities of daily living (1 load of laundry, clean dishes, etc) and 1 pleasurable activity for at least 15 minutes to begin completing and following a schedule  Revision Date: 10/27/22  Target Date: 10/26/22  Completion Date:      Date Initiated: 10/14/22  1 2 By the end of program, I will learn at least 3 mindfulness or grounding strategies that will assist me in decreasing my emotional intensity and assist me in remaining within the present moment  Revision Date: 10/27/22  Target Date: 10/26/22  Completion Date:     Date Initiated: 10/14/22  1 3 I will take medications as prescribed and share questions and concerns if arise  Revision Date: 10/27/22  Target Date: 10/26/22  Completion Date:      Date Initiated: 10/14/22  1 4 I will identify 3 ways my supports can assist in my recovery and agree to staff/support contact as indicated  Revision Date: 10/27/22  Target Date: 10/26/22  Completion Date:            7 DAY REVISION:  1 5: To maintain my wellness while experiencing PMDD symptoms, I will write down 1 manageable goal I want to accomplish each day in my identified mental health wellness areas I am working on  Date Initiated: 10/27/22  Revision Date:   Target Date: 11/07/22  Completion Date:        PSYCHIATRY:  Date Initiated:  10/14/22  Medication Management and Education       Revision Date: 10/27/22        1 3 Continue medication management   The person(s) responsible for carrying out the plan is Radha Sprague MD     NURSING:   Date Initiated: 10/14/22  1 1,1 2,1 3,1 4 This therapist will provide wellness/symptoms and skill education groups three to five days weekly to educate Beulah Matute on signs and symptoms of diagnoses, skills to manage, and medication questions that will be addressed by the treatment team     Revision Date: 10/27/22        1 1,1 2,1 3,1 4,1 5 Continue to encourage Beulah Matute to participate in wellness groups daily to learn about symptoms, coping strategies and warning signs to promote relapse prevention     The person(s) responsible for carrying out the plan is Zandra Desai MS      PSYCHOLOGY:   Date Initiated: 10/14/22       1 1, 1 2, 1 4 Provide psychotherapy group 5 times per week to allow opportunity for Myla Jackson  to explore stressors and ways of coping  Revision Date: 10/27/22   1 1,1 2,1 4,1 5  Continue to provide psychotherapy group daily to Myla Jackson and encourage sharing of stressors, skills and positive change  The person(s) responsible for carrying out the plan is Anjel Will MS     ALLIED THERAPY:   Date Initiated: 10/14/22  1 1,1 2 Engage Myla Jackson in AT group 5 times per week to encourage development and use of wellness tools to decrease symptoms and promote recovery through meaningful activity  Revision Date: 10/27/22   1 1,1 2,1 5 Continue to engage Myla Jackson to participate in AT group to practice wellness tools within program and transfer to home sharing successes and barriers through healthy task involvement  The person(s) responsible for carrying out the plan is MORENA Gregg and MORENA Mora     CASE MANAGEMENT:   Date Initiated: 10/14/22      1 0 This  will meet with Reji Juarez  3-4 times weekly to assess treatment progress, discharge planning, connection to community supports and UR as indicated  Revision Date: 10/27/22   1 0 Continue to meet with Reji Juarez 3-4 times weekly to assess growth, work toward goals, continued treatment needs, dc planning and use of supports  The person(s) responsible for carrying out the plan is MORENA Mora     TREATMENT REVIEW/COMMENTS:      DISCHARGE CRITERIA: Identify 3 signs of progress and complete relapse prevention plan      DISCHARGE PLAN: Connect with outpatient providers  Estimated Length of Stay: 10 treatment days      Diagnosis and Treatment Plan explained to Sachin Denton relates understanding diagnosis and is agreeable to Treatment Plan       CLIENT COMMENTS / Please share your thoughts, feelings, need and/or experiences regarding your treatment plan with Staff  Please see follow up note with comments      Signatures can be found on [facility] Treatment plan consent form  Diagnosis and Treatment Plan explained to Flako Guallpa relates understanding diagnosis and is agreeable to Treatment Plan  CLIENT COMMENTS / Please share your thoughts, feelings, need and/or experiences regarding your treatment plan with Staff  Please see follow up note with comments  Signatures can be found on [facility] Treatment plan consent form

## 2022-10-27 NOTE — PSYCH
Subjective:     Patient ID: Herson Barrera is a 28 y o  female  [facility] Clinical Progress Notes      Specialized Services Documentation  Therapist must complete separate progress note for each specific clinical activity in which the individual participated during the day  GROUP PSYCHOTHERAPY ([id number])   The group was provided psychoeducation on assertive communication from traits of assertive communicators, assertiveness tips, to examples of assertive communication  The group engaged in an assertive communication practice activity of providing assertive responses and assertive statements to various situations  Herson Barrera demonstrated progress towards goals and objectives through group participation  Continue with psychotherapy    Tx Plan Objectives: 1 1, 1 2, 1 4   Therapist: Pretty Vang MS

## 2022-10-27 NOTE — PSYCH
Subjective:     Patient ID: Wesly Gross is a 28 y o  female  [facility] Clinical Progress Notes      Specialized Services Documentation  Therapist must complete separate progress note for each specific clinical activity in which the individual participated during the day  Allied Therapy   [id number]- Wesly Gross actively shared in [facility] group that focused on the balanced mind  Group discussion pertained to how to live out of our balanced mind and identifying signs that our brain is being unbalanced (underthinking, overthinking, underfeeling, overfeeling)  Josefina Juarez engaged in group by listening to music from an emotional mind perspective vs  reasoning perspective and learning how to analyze a situation from a balanced mind perspective  Wesly Gross participated in group by by sharing personal insight and taking notes during group  Group explored practice of active song listening, group processing and discussion, and journaling  Some effort noted toward treatment goal   Continue AT to encourage the development and practice of understanding the balanced mind and utilizing opposite action to alleviate symptoms and support wellness  Tx Plan Objective: 1 1, 1 2, 1 4    Therapist:  MORENA Cameoj & JOSE ROBERTO Manley       Case Management Note  [id number]- CM met with Javid Lee had nothing she wanted to discuss today  She reported that she is feeling tired and is not sure if it is related to her PMDD symptoms  It was recommended that she try Vitamin B12 to see if that helps with energy  MORENA Camejo    Current suicide risk : Low     Medications changes/added/denied? No    Treatment session number: 9    Individual Case Management Visit provided today? Yes     [facility] follow up physician's orders: Continue to follow orders

## 2022-10-27 NOTE — PSYCH
Subjective:    Patient ID: Michael Akins is a 28 y o  female      [facility] Clinical Progress Notes      Specialized Services Documentation  Therapist must complete separate progress note for each specific clinical activity in which the individual participated during the day  Education Therapy   1214-1914  Michael Akins actively shared in check in and goal review  Presented as Receptive related to readiness to learn  Micheal Akins  did complete goal from last treatment day identifying gaining support  did not present with any barriers to learning  [id number]  Robe Juarez engaged throughout the treatment day  Was engaged in learning related to Illness, Medication, Aftercare and Wellness Tools  Staff utilized Verbal, Written, A/V and Demonstration teaching methods  Robe Juarez shared area of learning and set a goal for outside of program to put away clothes and cook dinner        Tx Plan Objective: 1 1, 1 2, 1 4, Therapist: KRYSTAL Badillo

## 2022-10-28 ENCOUNTER — OFFICE VISIT (OUTPATIENT)
Dept: PSYCHIATRY | Facility: CLINIC | Age: 32
End: 2022-10-28

## 2022-10-28 ENCOUNTER — OFFICE VISIT (OUTPATIENT)
Dept: PSYCHOLOGY | Facility: CLINIC | Age: 32
End: 2022-10-28
Payer: COMMERCIAL

## 2022-10-28 DIAGNOSIS — Z86.59 HISTORY OF DEPRESSION: ICD-10-CM

## 2022-10-28 DIAGNOSIS — F32.81 PMDD (PREMENSTRUAL DYSPHORIC DISORDER): Primary | ICD-10-CM

## 2022-10-28 DIAGNOSIS — F32.81 PMDD (PREMENSTRUAL DYSPHORIC DISORDER): ICD-10-CM

## 2022-10-28 DIAGNOSIS — F33.2 SEVERE EPISODE OF RECURRENT MAJOR DEPRESSIVE DISORDER, WITHOUT PSYCHOTIC FEATURES (HCC): Primary | ICD-10-CM

## 2022-10-28 RX ORDER — VENLAFAXINE HYDROCHLORIDE 75 MG/1
75 CAPSULE, EXTENDED RELEASE ORAL DAILY
Qty: 30 CAPSULE | Refills: 1 | Status: SHIPPED | OUTPATIENT
Start: 2022-10-28 | End: 2022-12-27

## 2022-10-28 RX ORDER — VENLAFAXINE HYDROCHLORIDE 150 MG/1
150 CAPSULE, EXTENDED RELEASE ORAL DAILY
Qty: 30 CAPSULE | Refills: 1 | Status: SHIPPED | OUTPATIENT
Start: 2022-10-28 | End: 2022-12-27

## 2022-10-28 NOTE — PSYCH
Subjective:     Patient ID: Silver Nevarez is a 28 y o  female  [facility] Clinical Progress Notes      Specialized Services Documentation  Therapist must complete separate progress note for each specific clinical activity in which the individual participated during the day  Group Psychotherapy ([id number])    The group engaged in the wellness assessment, which evaluates progress on several different areas of wellness/wellbeing: physical, emotional, cognitive, vocational, social and spiritual  Clients rated their progress and discussed areas that need work  By completing and discussing areas of progress and challenges, members are connected and reminded that, in their mental health struggle, they are not alone  Topics of discussion revolved around positive experiences within each area of wellness as well as the challenging aspects to wellness within their past week  Silver Nevarez continues to make progress towards goals through participation in group activity and personal disclosures  Continue psychotherapy groups to encourage further exploration of needs, personal awareness, and skills       Tx Plan Objective: 1 1,1 2, 1 4   Therapist: Ansley Bowman MS

## 2022-10-28 NOTE — PSYCH
Subjective:     Patient ID: Myla Jackson is a 28 y o  female  [facility] Clinical Progress Notes      Specialized Services Documentation  Therapist must complete separate progress note for each specific clinical activity in which the individual participated during the day  Allied Therapy    9924-0423 MTH group focused on a mindfulness experience and reviewed the WHAT and HOW skills as well as when we use them  The first experience was a mirroring movement experience and the group learned the definition of mindfulness  The group then discussed how the activity was a form a mindfulness as well as their personal experiences  After the movement experience, each group member had 1 minute to share with the group how they were feeling and what they were carrying to the group today  A bullet point  list was provided on the board for each group member about what they shared  Each group member was assigned a partner to dedicate a song to that individual with a song that related to what their partner struggled with  All songs will be shared and analyzed in group discussion tomorrow  Reji Juarez shared within group a song about grief and loss for a peer  Continue AT to encourage the development and practice of utilizing mindfulness and how to complete a self reflection check-in to  alleviate symptoms and support wellness  Tx Plan Objective: 1 1, 1 2, & 1 4   Therapist:  MORENA Mora          Case Management Note  JUMANA did not meet with Myla Jackson today as it was not a scheduled meeting day and@ did not request to meet with JUMANA  CM will meet with Reji Juarez  on next treatment day  MORENA Mora    Current suicide risk : Low     Medications changes/added/denied? No    Treatment session number: 10    Individual Case Management Visit provided today?  Yes     [facility] follow up physician's orders: Continue to follow orders

## 2022-10-28 NOTE — PSYCH
Visit Time    Visit Start Time: 1030am  Visit Stop Time: 1042am   Total Visit Duration: 20 minutes   This note was not shared with the patient due to reasonable likelihood of causing patient harm   PHP MEDICATION MANAGEMENT NOTE        [facility]    Name and Date of Birth:  Rebeka Juarez 28 y o  1990 MRN: [id number]    Date of Visit: October 28, 2022    No Known Allergies  SUBJECTIVE:    Maxim Zepeda is seen today for a follow up for Major Depressive Disorder and PMDD  She reports that she has decompensated slightly since the last visit  Patient reports she feels less energized today and reports she is fearful that PMDD cycle is starting again  Not yet feeling increase in depression however patient remains constricted restless and appears anxious  Patient reports she is concerned that she has difficulty with concentration asked about ADHD  Discussed difficulty with this diagnosis thing ADHD in adulthood and how other disorders such as depression can mimic symptoms of ADHD  Recommend considering neuropsychiatric testing if symptoms do not improve with adequate treatment of depression  She denies any side effects from medications  PLAN:  Continue Effexor  mg p o  Daily; last increased 3 weeks ago  Consider addition of Wellbutrin  Aware of 24 hour and weekend coverage for urgent situations accessed by calling [facility] main practice number  Continue partial hospitalization program    Diagnoses and all orders for this visit:    PMDD (premenstrual dysphoric disorder)    History of depression  -     venlafaxine (EFFEXOR-XR) 150 mg 24 hr capsule; Take 1 capsule (150 mg total) by mouth daily Take one 150mg capsule with one 75mg capsule at bedtime for total bedtime dose of 225mg   -     venlafaxine (EFFEXOR-XR) 75 mg 24 hr capsule;  Take 1 capsule (75 mg total) by mouth daily Take one 150mg capsule with one 75mg capsule at bedtime for total bedtime dose of 225mg  Current Outpatient Medications on File Prior to Visit   Medication Sig Dispense Refill   • [DISCONTINUED] venlafaxine (EFFEXOR-XR) 150 mg 24 hr capsule Take 1 capsule (150 mg total) by mouth daily Take one 150mg capsule with one 75mg capsule at bedtime for total bedtime dose of 225mg  30 capsule 0   • [DISCONTINUED] venlafaxine (EFFEXOR-XR) 75 mg 24 hr capsule Take 1 capsule (75 mg total) by mouth daily Take one 150mg capsule with one 75mg capsule at bedtime for total bedtime dose of 225mg  30 capsule 0     No current facility-administered medications on file prior to visit  Psychotherapy Provided:     Individual psychotherapy provided: Yes  Counseling was provided during the session today for 16 minutes  Supportive counseling provided  HPI ROS Appetite Changes and Sleep:     She reports normal sleep, adequate appetite, low energy   Denies homicidal ideation, denies suicidal ideation    Review Of Systems:      General emotional problems and decreased functioning   Personality no change in personality   Constitutional negative   ENT negative   Cardiovascular negative   Respiratory negative   Gastrointestinal negative   Genitourinary negative   Musculoskeletal negative   Integumentary negative   Neurological negative   Endocrine negative   Other Symptoms none, all other systems are negative     Mental Status Evaluation:    Appearance Adequate hygiene and grooming   Behavior restless and fidgety   Mood dysphoric  Depression Scale -  of 10 (0 = No depression)  Anxiety Scale -  of 10 (0 = No anxiety)   Speech Normal rate and volume   Affect constricted   Thought Processes Goal directed and coherent   Thought Content Does not verbalize delusional material   Associations Tightly connected   Perceptual Disturbances Denies hallucinations and does not appear to be responding to internal stimuli   Risk Potential Suicidal/Homicidal Ideation - No evidence of suicidal or homicidal ideation and patient does not verbalize suicidal or homicidal ideation  Risk of Violence - No evidence of risk for violence found on assessment  Risk of Self Mutilation - No evidence of risk for self mutilation found on assessment   Orientation oriented to person, place, time/date and situation   Memory recent and remote memory grossly intact   Consciousness alert and awake   Attention/Concentration attention span and concentration are age appropriate   Insight limited   Judgement fair   Muscle Strength and Gait normal muscle strength and normal muscle tone, normal gait/station and normal balance   Motor Activity no abnormal movements   Language no difficulty naming common objects, no difficulty repeating a phrase, no difficulty writing a sentence   Fund of Knowledge adequate knowledge of current events  adequate fund of knowledge regarding past history  adequate fund of knowledge regarding vocabulary      Past Psychiatric History Update:     Inpatient Psychiatric Admission Since Last Encounter:   no  Suicide Attempt Or Self Mutilation Since Last Encounter:   no  Incidence of Violent Behavior Since Last Encounter:   no    Traumatic History Update:     New Onset of Abuse Since Last Encounter:   no  Traumatic Events Since Last Encounter:   no    Past Medical History:    Past Medical History:   Diagnosis Date   • Anxiety    • Depression         Past Surgical History:   Procedure Laterality Date   • SALPINGECTOMY       No Known Allergies  Substance Abuse History:    Social History     Substance and Sexual Activity   Alcohol Use Not Currently   • Alcohol/week: 10 0 standard drinks   • Types: 10 Glasses of wine per week    Comment: every other weekend, last time 10/5/22     Social History     Substance and Sexual Activity   Drug Use Never     Social History:    Social History     Socioeconomic History   • Marital status: Single     Spouse name: Not on file   • Number of children: 5   • Years of education: college student   • Highest education level: Not on file   Occupational History   • Not on file   Tobacco Use   • Smoking status: Never Smoker   • Smokeless tobacco: Never Used   Vaping Use   • Vaping Use: Never used   Substance and Sexual Activity   • Alcohol use: Not Currently     Alcohol/week: 10 0 standard drinks     Types: 10 Glasses of wine per week     Comment: every other weekend, last time 10/5/22   • Drug use: Never   • Sexual activity: Not on file   Other Topics Concern   • Not on file   Social History Narrative   • Not on file     Social Determinants of Health     Financial Resource Strain: Not on file   Food Insecurity: Not on file   Transportation Needs: Not on file   Physical Activity: Not on file   Stress: Not on file   Social Connections: Not on file   Intimate Partner Violence: Not on file   Housing Stability: Not on file     Family Psychiatric History:     Family History   Problem Relation Age of Onset   • No Known Problems Mother    • No Known Problems Father    • Drug abuse Cousin    • Alcohol abuse Cousin    • Psychiatric Illness Neg Hx      History Review: The following portions of the patient's history were reviewed and updated as appropriate: allergies, current medications, past family history, past medical history, past social history, past surgical history and problem list     OBJECTIVE:     Vital signs in last 24 hours: There were no vitals filed for this visit  Laboratory Results: I have personally reviewed all pertinent laboratory/tests results  Medications Risks/Benefits:      Risks, Benefits And Possible Side Effects Of Medications:    Discussed risks and benefits of treatment with patient including risk of suicidality, serotonin syndrome, increased QTc interval and SIADH related to treatment with antidepressants;  Risk of induction of manic symptoms in certain patient populations     Controlled Medication Discussion:     Not applicable    STEVIE Garcia 10/28/22

## 2022-10-28 NOTE — PSYCH
Subjective:    Patient ID: Jannette Barrera is a 28 y o  female      [facility] Clinical Progress Notes      Specialized Services Documentation  Therapist must complete separate progress note for each specific clinical activity in which the individual participated during the day  Education Therapy   [id number]  Jannette Barrera actively shared in check in and goal review  Presented as Receptive related to readiness to learn  Jannette Barrera  did complete goal from last treatment day of putting away clothes and making dinner identifying gaining responsibility and support  did not present with any barriers to learning  [id number]  Jose L Linderfloresvahid engaged throughout the treatment day  Was engaged in learning related to Illness, Medication, Aftercare and Wellness Tools  Staff utilized Verbal, Written, A/V and Demonstration teaching methods  Jose L Linderdesireehaydenrosavahid shared area of learning and set a goal for outside of program to listen to a comforting playlist       Tx Plan Objective: 1 1, 1 2, 1 4, Therapist: KRYSTAL Fonseca      Group Psychotherapy    [id number]  Jannette Barrera participated actively in a psychoeducational group focused on coping strategies for emotionally intense weekends  Group members were provided with various DBT handouts and received information on how to use emotion regulation and distress tolerance skills during times outside of programming that may be particularly triggering  Materials covered topics including: opposite action, ACCEPTS, and self-soothing  The group also held space for open reflections and opportunities for giving and receiving peer support  Jose L WillsHumza shared that over the weekend they will self-soothe with the senses  good  progress noted towards goals   Continue with psychotherapy to encourage further development of strategies for managing distress and conflict outside of programming      Tx Plan Objective 1 1, 1 2, 1 4 Therapist: KRYSTAL Kaplan

## 2022-10-31 ENCOUNTER — OFFICE VISIT (OUTPATIENT)
Dept: PSYCHOLOGY | Facility: CLINIC | Age: 32
End: 2022-10-31

## 2022-10-31 DIAGNOSIS — F33.2 SEVERE EPISODE OF RECURRENT MAJOR DEPRESSIVE DISORDER, WITHOUT PSYCHOTIC FEATURES (HCC): Primary | ICD-10-CM

## 2022-10-31 DIAGNOSIS — F32.81 PMDD (PREMENSTRUAL DYSPHORIC DISORDER): ICD-10-CM

## 2022-10-31 NOTE — PSYCH
Subjective:    Patient ID: Robe Rosenthal is a 28 y o  female      [facility] Clinical Progress Notes      Specialized Services Documentation  Therapist must complete separate progress note for each specific clinical activity in which the individual participated during the day  Education Therapy   [id number]  Robe Rosenthal actively shared in check in and goal review  Presented as Receptive related to readiness to learn  Robe Rosenthal  did complete all goals from last treatment day, other than putting away clothes, identifying gaining hope, education, advocacy, responsibility, and support  did not present with any barriers to learning  [id number]  Kaylyn Juarez engaged throughout the treatment day  Was engaged in learning related to Illness, Medication, Aftercare and Wellness Tools  Staff utilized Verbal, Written, A/V and Demonstration teaching methods  Kaylyn Juarez shared area of learning and set a goal for outside of program to: cook something, talk with her partner about finances, make appointments, and submit an assignment  Tx Plan Objective: 1 1, 1 2, 1 4, Therapist: Lisa Rios [facility] Psychotherapy    [id number] Robe Rosenthal participated actively in a psychoeducational group focused on active listening  Group members learned main pillars and components of active listening, as well as seven techniques to strengthen active listening skills  Participants engaged in emotions charades where, after writing down / turning in an emotion, each member took a turn in front of the room acting out the emotion they abhi from a bin while other members tried to guess the emotion   Group members were also paired in teams where one person was the "speaker" who described an abstract geometric image to their partner(s), the "listener," who was facing away from the speaker and, without communicating back, attempted to recreate the image on a piece of paper  Zack Juarez shared that things from active listening that they can focus on this week are verbalizing emotions, asking questions, and clarifying  Continues to make progress towards goals  Continue with psychotherapy to further encourage interpersonal communication skills       Tx Plan Objective 1 1, 1 2, 1 4 Therapist: KRYSTAL Tyson

## 2022-10-31 NOTE — PSYCH
Subjective:     Patient ID: Elijah Marcus is a 28 y o  female  [facility] Clinical Progress Notes      Specialized Services Documentation  Therapist must complete separate progress note for each specific clinical activity in which the individual participated during the day  Allied Therapy  [id number]-- Elijah Marcus actively shared in [facility] group that focused on identifying negative coping skills, categorizing positive coping skills, and identifying which category to use when engaging in negative coping skills  Group explored practice of identifying what negative coping skills we use in our daily lives, the steps that need to be taken to replace the negative coping skills, categorizing positive coping skills, and analyzing when they engage in a negative coping skill what category of coping skills do they need to utilize  To explore this topic, the group engaged in farzad analysis, self-reflection, group discussion, and creating a group visual aid  Group also discussed what they need to do to keep themselves on track with utilizing positive coping skills (I e  accountability, creating a schedule, etc ) Elijah Marcus participated frequently in group today by contributing to conversation in every topic  Some effort made toward treatment goal   Continue AT to encourage the development of positive coping skills and pleasurable activities to alleviate symptoms and support wellness  Treatment Objectives: 1 1, 1 2, & 1 4     Therapist:  Bisi Gibson MT-BC           Case Management Note  [id number]- Cecilia and JUMANA reviewed her updated tx plan  She had no plans or questions  Reji Coates shared that she had a difficult weekend this weekend, however, she noted that was able to find appropriate distractions to engage in  She also recognized her partner giving her affirmations was helpful   Nika reviewed Reji Coates creating her own mantras and affirmations to utilize  MORENA Vincent    Current suicide risk : Low    Medications changes/added/denied? No    Treatment session number: 11    Individual Case Management Visit provided today? Yes    [facility] follow up physician's orders: Continue to follow orders

## 2022-10-31 NOTE — PSYCH
Subjective:     Patient ID: Claude Both is a 28 y o  female  [facility] Clinical Progress Notes      Specialized Services Documentation  Therapist must complete separate progress note for each specific clinical activity in which the individual participated during the day  Psychotherapeutic Group ([id number])    The group learned about the science behind loneliness and how we create a self-sustaining cycle of feeling lonely/isolated  They identified how they manifest loneliness in their own lives  Multiple discussions took place regarding loneliness and how we can combat this feeling  We also discussed the health concerns associated with feeling chronic loneliness  The group watched a short video on loneliness which was very eye opening for multiple group members  The group then learned/shared a variety of methods to address loneliness as an individual and how to meet new people  Claude Both  participated in the group by engaging in (and initiating) multiple discussions, taking notes, sharing insights on the topic at hand, sharing insights on the media we used ([id number]), and requesting information on cognitive distortions  They appear to be working towards goals  Continue psychotherapy groups to encourage further exploration of needs, personal awareness, and skills       Tx Plan Objective: 1 1,1 2, 1 4   Therapist: Nidia Collins MS

## 2022-11-01 ENCOUNTER — OFFICE VISIT (OUTPATIENT)
Dept: PSYCHOLOGY | Facility: CLINIC | Age: 32
End: 2022-11-01

## 2022-11-01 DIAGNOSIS — F33.2 SEVERE EPISODE OF RECURRENT MAJOR DEPRESSIVE DISORDER, WITHOUT PSYCHOTIC FEATURES (HCC): Primary | ICD-10-CM

## 2022-11-01 DIAGNOSIS — F32.81 PMDD (PREMENSTRUAL DYSPHORIC DISORDER): ICD-10-CM

## 2022-11-01 NOTE — PSYCH
Subjective:     Patient ID: Lucia Guevara is a 28 y o  female  [facility] Clinical Progress Notes      Specialized Services Documentation  Therapist must complete separate progress note for each specific clinical activity in which the individual participated during the day  Group Psychotherapy     0930-1030 Lucia Guevara actively shared in psychotherapy group focused on DBT mindfulness strategies “what” skills  Group tasks and discussions explored practice of “what” skills through observing, describing and participating including progressive muscle relaxation  Morales Veloz identified they could practice mindfulness today through being mindful with his kids  Some effort noted toward treatment goal   Continue psychotherapy to encourage the development and practice of mindfulness strategies to alleviate symptoms and support wellness    Tx Plan Objective: 1 1 Therapist:  Adriano BERG

## 2022-11-01 NOTE — PSYCH
Subjective:     Patient ID: Jannette Barrera is a 28 y o  female  [facility] Clinical Progress Notes      Specialized Services Documentation  Therapist must complete separate progress note for each specific clinical activity in which the individual participated during the day  Group Psychotherapy   [id number] Processing, psychotherapy group focused on The Four Agreements by Virgil Almonte based on ancient, Toltec wisdom and philosophy  Group was informed that the ancient wisdom and philosophy has a spiritual-based foundation and that, if a group member does not believe in spirituality, he or she may dismiss the spiritual content of the concepts and focus only on the guidelines surrounding The Four Agreements  Group was psychoeducated on what constitutes The Four Agreements and how to achieve personal freedom through self-help:  Agreement 1 - Be Impeccable with Your Word; Agreement 2 - Don't Take Anything Personally; Agreement 3 - Don't Make Assumptions; Agreement 4 - Always Do Your Best   The group was encouraged to explore what of The Four Agreements resonated with them, how these are impacting their world (whether be strength and/or need), and what they would like to work on    Jannette Barrera did not share but supported peers who did; progress towards goals good as she presented as actively listening and engaged  Continue psychotherapy to encourage self-awareness and exploration of challenging emotions, thoughts, and behaviors especially related to trauma        Tx Plan Objectives: 1 1, 1 2, 1 4,        Therapist:  Lakia Ross MS

## 2022-11-01 NOTE — PSYCH
Subjective:     Patient ID: Saturinno Tilley is a 28 y o  female  [facility] Clinical Progress Notes      Specialized Services Documentation  Therapist must complete separate progress note for each specific clinical activity in which the individual participated during the day  Allied Therapy   [id number] Group members participated in an affirmation songwriting experience  They were invited to participate in the opening exercise to their level of comfort (listening, playing or humming along) as the  performed a pre-written affirmation song  Discussion was led afterward referencing the song lyrics "taking one step at a time even when it's not all fine"  Clients were asked about difficulties and successes regarding growth in their wellness journey  The group was then invited to contribute their own lyrics in response to the line "Every day from now to then, it all counts"  Answers were written on the board and group members worked together to put them in order, adding more verses to the song  Saturnino Tilley frequently participated in group discussion and song-writing  She also played an instrument to accompany the group  Continue AT for the development and practice of writing affirmations through songwriting  Tx Objective: 1 1, 1 2, 1 4  Therapists: JOSE ROBERTO Miller and Charmayne Galea, MT-BC       Case Management Note  [phone]- JT met with Britany James to review OP psychiatrists  She signed an GÉNESIS for her OP psychiatrist  She reported she still has not contacted OP therapists but plans to begin tonight Charmayne Galea, MT-BC    Current suicide risk : Low     Medications changes/added/denied? No    Treatment session number: 12    Individual Case Management Visit provided today? Yes     [facility] follow up physician's orders: Continue to follow orders

## 2022-11-01 NOTE — PSYCH
Subjective:    Patient ID: Yovany Schneider is a 28 y o  female      [facility] Clinical Progress Notes      Specialized Services Documentation  Therapist must complete separate progress note for each specific clinical activity in which the individual participated during the day  Education Therapy   [id number]  Yovany Schneider actively shared in check in and goal review  Presented as Receptive related to readiness to learn  Yovany Schneider  did not any complete goals from last treatment day  did not present with any barriers to learning  [id number]  Beryl Juarez engaged throughout the treatment day  Was engaged in learning related to Illness, Medication, Aftercare and Wellness Tools  Staff utilized Verbal, Written, A/V and Demonstration teaching methods  Beryl Valle Larry shared area of learning and set a goal for outside of program to be present and season chicken        Tx Plan Objective: 1 1, 1 2, 1 4, Therapist: KRYSTAL Ray

## 2022-11-02 ENCOUNTER — OFFICE VISIT (OUTPATIENT)
Dept: PSYCHOLOGY | Facility: CLINIC | Age: 32
End: 2022-11-02

## 2022-11-02 DIAGNOSIS — F32.81 PMDD (PREMENSTRUAL DYSPHORIC DISORDER): ICD-10-CM

## 2022-11-02 DIAGNOSIS — F33.2 SEVERE EPISODE OF RECURRENT MAJOR DEPRESSIVE DISORDER, WITHOUT PSYCHOTIC FEATURES (HCC): Primary | ICD-10-CM

## 2022-11-02 NOTE — PSYCH
Subjective:     Patient ID: Lucia Guevara is a 28 y o  female  [facility] Clinical Progress Notes      Specialized Services Documentation  Therapist must complete separate progress note for each specific clinical activity in which the individual participated during the day  Group Psychotherapy (10:45-11:45) Morales Roselia was in attendance for this psychotherapy group  This group was on control and acceptance  Participants began with a brief mindfulness activity in which they were asked to walk around and explored the room with their senses taking note of things they may have never noticed before   Participants then added responses to a diagram of the things one can control and things one's cannot control and discussed these in the group  For instance the group discussed that one cannot control the past, other's feelings, responses and actions but one can control the energy they give people and things, their goals, their intentions, etc  Participants then reviewed and discussed a DBT video via [employer]ube on emotional regulation and acceptance  Participants discussed how it is unhelpful to try to control emotions by suppressing them and avoiding them and the importance of identifying emotions (although at times challenging) and validating them to lessen their impact  Morales Veloz ws attentive and participated  She is recommended to continue group participation and applications of skills learned in group to work towards treatment goals        Tx Goal Objective: 1 1,1 2  Therapist: KHADIJAH Yeung

## 2022-11-02 NOTE — PSYCH
Subjective:    Patient ID: Jorge Ochoa is a 28 y o  female      [facility] Clinical Progress Notes      Specialized Services Documentation  Therapist must complete separate progress note for each specific clinical activity in which the individual participated during the day  Education Therapy   [id number]  Jorge Ochoa actively shared in check in and goal review  Presented as Receptive related to readiness to learn  Jorge Ochoa  did complete goal from last treatment day identifying gaining support, hope, and advocacy  did not present with any barriers to learning  [id number]  Vida Juarez engaged throughout the treatment day  Was engaged in learning related to Illness, Medication, Aftercare and Wellness Tools  Staff utilized Verbal, Written, A/V and Demonstration teaching methods  Vida Juarez shared area of learning and set a goal for outside of program to cook for the kids, find a therapist, and make a doctor's appointment for herself  Tx Plan Objective: 1 1, 1 2, 1 4, Therapist: Citlali Re[facility]     [id number] Jorge Ochoa actively participated in a psychotherapy group focused on personal rights and responsibilities  Group discussed what personal rights mean to them and reflected on their relationship and experiences with personal rights  Participants were given a handout containing a 27 point Personal Bill of Rights, adapted by Payam Garcia  Group members were also given a handout containing a table with rights and their corresponding responsibilities  Members discussed their reflections on both provided materials  Vida Juarez shared that this week they can focus on the right "to say no to requests or demands I can't meet " Jorge Ochoa continues to make progress towards treatment goals   Continue with psychotherapy to encourage further exploration of the self       Tx Plan Objective 1 1, 1 2, 1 4 Therapist: KRYSTAL Kaplan

## 2022-11-02 NOTE — PSYCH
Subjective:     Patient ID: Kat Rendon is a 28 y o  female  [facility] Clinical Progress Notes      Specialized Services Documentation  Therapist must complete separate progress note for each specific clinical activity in which the individual participated during the day  Allied Therapy   [id number]-  Kat Rendon actively shared in [facility] group focused on utilizing the ABCDE model to cognitively re-frame all-or-nothing thinking  Zack Juarez engaged in farzad analysis, completing an ABCDE diary log, and group discussion  Group explored what the ABCDE model is (adversity, behavior, consequences, disputation, and energization), identified all-or-nothing thinking in a farzad analysis, learned about the 3 P's related to what contributes to negative thinking (permanence, pervasiveness, and personalization)  Each group member was provided handouts as well as an ABCDE diary log to fill out at least 1 negative belief they had  Kat Rendon presented as receptive during group and independently participated in group discussion  Some effort noted toward treatment goal   Continue AT to encourage the development and practice of utilizing the ABCDE model to decrease negative thinking,/beliefs, develop personal insight, actively identify new action steps, decrease symptoms, and support wellness  Tx Plan Objective: 1 1, 1 2, 1 4   Therapist:  MORENA Ochoa      Case Management Note  CM offered to meet with Matt Calderon but Matt Calderon declined a case management as she believes she is doing well and does not have anything to discuss at this time  CM will occur next tx day  MORENA Ochoa    Current suicide risk : Low     Medications changes/added/denied? No    Treatment session number: 13    Individual Case Management Visit provided today?  No (individual declined meeting today it was offered)     [facility] follow up physician's orders: Continue to follow orders

## 2022-11-03 ENCOUNTER — APPOINTMENT (OUTPATIENT)
Dept: PSYCHOLOGY | Facility: CLINIC | Age: 32
End: 2022-11-03

## 2022-11-03 ENCOUNTER — DOCUMENTATION (OUTPATIENT)
Dept: PSYCHOLOGY | Facility: CLINIC | Age: 32
End: 2022-11-03

## 2022-11-03 NOTE — PROGRESS NOTES
Subjective:     Patient ID: Augusto Azevedo is a 28 y o  female  [facility] Clinical Progress Notes      Specialized Services Documentation  Therapist must complete separate progress note for each specific clinical activity in which the individual participated during the day  Case Management Note  Elder Beltran cancelled tx today due to have a scheduled PCP appointment that interfered with program schedule  She will return to program tomorrow  MORENA Gonzalez    Current suicide risk : Low     Medications changes/added/denied? No    Treatment session number: 14    Individual Case Management Visit provided today? Yes     [facility] follow up physician's orders: Continue to follow orders

## 2022-11-04 ENCOUNTER — OFFICE VISIT (OUTPATIENT)
Dept: PSYCHIATRY | Facility: CLINIC | Age: 32
End: 2022-11-04

## 2022-11-04 ENCOUNTER — OFFICE VISIT (OUTPATIENT)
Dept: PSYCHOLOGY | Facility: CLINIC | Age: 32
End: 2022-11-04

## 2022-11-04 DIAGNOSIS — F32.81 PMDD (PREMENSTRUAL DYSPHORIC DISORDER): ICD-10-CM

## 2022-11-04 DIAGNOSIS — F33.2 SEVERE EPISODE OF RECURRENT MAJOR DEPRESSIVE DISORDER, WITHOUT PSYCHOTIC FEATURES (HCC): Primary | ICD-10-CM

## 2022-11-04 NOTE — PSYCH
Subjective:     Patient ID: Myla Jackson is a 28 y o  female  [facility] Clinical Progress Notes      Specialized Services Documentation  Therapist must complete separate progress note for each specific clinical activity in which the individual participated during the day  Group Psychotherapy ([id number])    The group engaged in the wellness assessment, which evaluates progress on several different areas of wellness/wellbeing: physical, emotional, cognitive, vocational, social and spiritual  Clients rated their progress and discussed areas that need work  By completing and discussing areas of progress and challenges, members are connected and reminded that, in their mental health struggle, they are not alone  Topics of discussion revolved around positive experiences within each area of wellness as well as the challenging aspects to wellness within their past week  Myla Jackson continues to make progress towards goals through participation in group activity and personal disclosures  Continue psychotherapy groups to encourage further exploration of needs, personal awareness, and skills       Tx Plan Objective: 1 1,1 2, 1 4   Therapist: Zandra Desai MS

## 2022-11-04 NOTE — PSYCH
Subjective:    Patient ID: Claude Both is a 28 y o  female      [facility] Clinical Progress Notes      Specialized Services Documentation  Therapist must complete separate progress note for each specific clinical activity in which the individual participated during the day  Education Therapy   [id number]  Claude Both actively shared in check in and goal review  Presented as Receptive related to readiness to learn  Claude Both  did complete all goals from last treatment day (aside from finding a therapist) identifying gaining support, responsibility, and advocacy  did not present with any barriers to learning  [id number]  Deberah Shone RepolletGonzalez engaged throughout the treatment day  Was engaged in learning related to Illness, Medication, Aftercare and Wellness Tools  Staff utilized Verbal, Written, A/V and Demonstration teaching methods  Deberah Shone RepolletGonzalez shared area of learning and set a goal for outside of program to pick a move-out date and survive the weekend using tools and things she has learned so far from program       Tx Plan Objective: 1 1, 1 2, 1 4, Therapist: Mario Kaplan    Group Psychotherapy    [id number] Claude Both participated actively in an educational group about the "[employer] " The group learned about the roles people take on in unhealthy relationships, why people might engage in drama, and how to shift from the drama triangle to the empowerment triangle  Participants were given the opportunity to discuss personal role patterns, reflections on unhealthy relationship dynamics and situations they have encountered that modeled the triangle  The group members were given a handout of steps to take to avoid contributing to unhealthy interactions which they reflected on    guided participants through discussion of and education on the persecutor, victim, and rescuer roles, as well as the challenger, survivor, and  roles  The group also received a physical copy of the drama to empowerment triangle to review  Tanesha Gayle Larry shared about her relationship dynamics openly  Sarina Meza continues to make progress towards treatment goals  Continue with psychotherapy to encourage further examination of unhealthy relationship dynamics       Tx Plan Objective 1 1, 1 2, 1 4 Therapist: KRYSTAL Pineda

## 2022-11-04 NOTE — PSYCH
Subjective:     Patient ID: Lucia Guevara is a 28 y o  female  [facility] Clinical Progress Notes      Specialized Services Documentation  Therapist must complete separate progress note for each specific clinical activity in which the individual participated during the day  Allied Therapy   [id number] Lucia Guevara actively shared and participated in [facility] group focused on practicing various mindfulness exercises, reviewing the what skills of mindfulness, and learning about open curiosity  The group began with a movement experience that involved creative expression with each individual creating their own movement  The group then engaged in thinking about a person, place, thing, or activity that they enjoy and experience pleasure doing  For each drawing they had 30 seconds and the instructions were they had duplicate their drawing in the same place on each paper after they passed their paper to a peer  After they completed the activity, the group discussed what mindfulness is, learned the definition of one mindfully, and reviewed the what skills  They completed the activity one more time  Using their visual sense, they practiced describing with facts only what they saw on their paper  They engaged in non-judgmental practice by utilizing open curiosity questions (what, how, where, who, and when) to ask their peers about their drawing  Each peer transferred this skill to their personal intrusive thoughts using their non-judgmental questions  Lucia Guevara participated in group by completing each experience and sharing personal insight  She reported that her mood changed from hopeless to hopeful  Cecilia's body affect and facial expressions also changed to support her statement that she shared with the group   Some effort noted toward treatment goal   Continue AT to encourage the development and practice of using mindfulness strategies to decrease symptoms and increase wellness  Tx Plan Objective: 1 1, 1 2, 1 4    Therapist:  MORENA Melgoza      Case Management Note  [id number]- CM met with Reji Coates  Reji Coates reported that she is  from her partner and will be moving with [place] to her mother and bringing her children with her  She discussed this decision and how she came to the conclusion as well as has been discussing how she has been remaining in control herself and setting boundaries during challenging moments with her partner  JUMANA and Cecilia discussed DC next week and she agreed to this decision as she is recognizing she is applying what she is learning outside of program and making healthier choices to cope  MORENA Melgoza    Current suicide risk : Low     Medications changes/added/denied? No    Treatment session number: 14    Individual Case Management Visit provided today? Yes     [facility] follow up physician's orders: Continue to follow orders

## 2022-11-04 NOTE — PSYCH
Visit Time    Visit Start Time: 2423  Visit Stop Time: 1009  Total Visit Duration: 12 minutes   This note was not shared with the patient due to reasonable likelihood of causing patient harm   PHP MEDICATION MANAGEMENT NOTE        Anthony Armandotriny    Name and Date of Birth:  Thaddeus Juarez 28 y o  1990 MRN: [id number]    Date of Visit: November 4, 2022    No Known Allergies  SUBJECTIVE:    Colin Henning is seen today for a follow up for depression, anxiety, insomnia and PMDD  She reports that she has experienced on and off symptoms since the last visit  Patient reports she continues to experience daily stressors, primarily significant other and father of her children with whom she lives  Patient is currently considering moving to [place] with mother after finishing program   Experiencing passive suicidal thoughts without any plan or intent  Patient has been successfully using coping skills to self soothe and alleviate depressive feelings and suicidal thoughts  She denies any side effects from medications  PLAN:  Continue Effexor- mg p o  Daily  Aware of 24 hour and weekend coverage for urgent situations accessed by calling [facility] main practice number  Continue partial hospitalization program    Diagnoses and all orders for this visit:    Severe episode of recurrent major depressive disorder, without psychotic features ([facility] [address] )    PMDD (premenstrual dysphoric disorder)      Current Outpatient Medications on File Prior to Visit   Medication Sig Dispense Refill   • venlafaxine (EFFEXOR-XR) 150 mg 24 hr capsule Take 1 capsule (150 mg total) by mouth daily Take one 150mg capsule with one 75mg capsule at bedtime for total bedtime dose of 225mg   30 capsule 1   • venlafaxine (EFFEXOR-XR) 75 mg 24 hr capsule Take 1 capsule (75 mg total) by mouth daily Take one 150mg capsule with one 75mg capsule at bedtime for total bedtime dose of 225mg  30 capsule 1     No current facility-administered medications on file prior to visit  Psychotherapy Provided:     Individual psychotherapy provided: Yes  Counseling was provided during the session today for 16 minutes  Supportive counseling provided  HPI ROS Appetite Changes and Sleep:     She reports frequent awakenings, adequate appetite, low energy   Denies homicidal ideation, denies suicidal ideation    Review Of Systems:      General emotional problems, decreased functioning, sleep disturbances and relationship problems   Personality no change in personality   Constitutional negative   ENT negative   Cardiovascular negative   Respiratory negative   Gastrointestinal negative   Genitourinary negative   Musculoskeletal negative   Integumentary negative   Neurological negative   Endocrine negative   Other Symptoms none, all other systems are negative     Mental Status Evaluation:    Appearance Adequate hygiene and grooming   Behavior calm and cooperative   Mood anxious and depressed  Depression Scale -  of 10 (0 = No depression)  Anxiety Scale -  of 10 (0 = No anxiety)   Speech Normal rate and volume   Affect mood-congruent; brighter today, less gaurded   Thought Processes Goal directed and coherent   Thought Content Does not verbalize delusional material   Associations Tightly connected   Perceptual Disturbances Denies hallucinations and does not appear to be responding to internal stimuli   Risk Potential Suicidal/Homicidal Ideation - Passive suicidal ideation without intent or plan  Risk of Violence - No evidence of risk for violence found on assessment  Risk of Self Mutilation - No evidence of risk for self mutilation found on assessment   Orientation oriented to person, place, time/date and situation   Memory recent and remote memory grossly intact   Consciousness alert and awake   Attention/Concentration attention span and concentration are age appropriate   Insight fair   Judgement fair   Muscle Strength and Gait normal muscle strength and normal muscle tone, normal gait/station and normal balance   Motor Activity no abnormal movements   Language no difficulty naming common objects, no difficulty repeating a phrase, no difficulty writing a sentence   Fund of Knowledge adequate knowledge of current events  adequate fund of knowledge regarding past history  adequate fund of knowledge regarding vocabulary      Past Psychiatric History Update:     Inpatient Psychiatric Admission Since Last Encounter:   no  Suicide Attempt Or Self Mutilation Since Last Encounter:   no  Incidence of Violent Behavior Since Last Encounter:   no    Traumatic History Update:     New Onset of Abuse Since Last Encounter:   no  Traumatic Events Since Last Encounter:   no    Past Medical History:    Past Medical History:   Diagnosis Date   • Anxiety    • Depression         Past Surgical History:   Procedure Laterality Date   • SALPINGECTOMY       No Known Allergies  Substance Abuse History:    Social History     Substance and Sexual Activity   Alcohol Use Not Currently   • Alcohol/week: 10 0 standard drinks   • Types: 10 Glasses of wine per week    Comment: every other weekend, last time 10/5/22     Social History     Substance and Sexual Activity   Drug Use Never     Social History:    Social History     Socioeconomic History   • Marital status: Single     Spouse name: Not on file   • Number of children: 5   • Years of education: college student   • Highest education level: Not on file   Occupational History   • Not on file   Tobacco Use   • Smoking status: Never Smoker   • Smokeless tobacco: Never Used   Vaping Use   • Vaping Use: Never used   Substance and Sexual Activity   • Alcohol use: Not Currently     Alcohol/week: 10 0 standard drinks     Types: 10 Glasses of wine per week     Comment: every other weekend, last time 10/5/22   • Drug use: Never   • Sexual activity: Not on file   Other Topics Concern   • Not on file Social History Narrative   • Not on file     Social Determinants of Health     Financial Resource Strain: Not on file   Food Insecurity: Not on file   Transportation Needs: Not on file   Physical Activity: Not on file   Stress: Not on file   Social Connections: Not on file   Intimate Partner Violence: Not on file   Housing Stability: Not on file     Family Psychiatric History:     Family History   Problem Relation Age of Onset   • No Known Problems Mother    • No Known Problems Father    • Drug abuse Cousin    • Alcohol abuse Cousin    • Psychiatric Illness Neg Hx      History Review: The following portions of the patient's history were reviewed and updated as appropriate: allergies, current medications, past family history, past medical history, past social history, past surgical history and problem list     OBJECTIVE:     Vital signs in last 24 hours: There were no vitals filed for this visit  Laboratory Results: I have personally reviewed all pertinent laboratory/tests results  Medications Risks/Benefits:      Risks, Benefits And Possible Side Effects Of Medications:    Discussed risks and benefits of treatment with patient including risk of suicidality, serotonin syndrome, increased QTc interval and SIADH related to treatment with antidepressants;  Risk of induction of manic symptoms in certain patient populations     Controlled Medication Discussion:     Not applicable    STEVIE Henry 11/04/22

## 2022-11-07 ENCOUNTER — OFFICE VISIT (OUTPATIENT)
Dept: PSYCHOLOGY | Facility: CLINIC | Age: 32
End: 2022-11-07

## 2022-11-07 DIAGNOSIS — F32.81 PMDD (PREMENSTRUAL DYSPHORIC DISORDER): ICD-10-CM

## 2022-11-07 DIAGNOSIS — F33.2 SEVERE EPISODE OF RECURRENT MAJOR DEPRESSIVE DISORDER, WITHOUT PSYCHOTIC FEATURES (HCC): Primary | ICD-10-CM

## 2022-11-07 NOTE — PSYCH
Subjective:     Patient ID: Yrn Cardenas is a 28 y o  female  [facility] Clinical Progress Notes      Specialized Services Documentation  Therapist must complete separate progress note for each specific clinical activity in which the individual participated during the day  Allied Therapy   [id number]- Yrn Cardenas actively shared in [facility] group focused on learning what a vision board is and creating their own  Group explored the importance of vision boards, how to create a SMART goal, and determining their first action step to accomplishing something on their vision board  Group explored vision boards through group discussion, group interaction, visual arts, and goal setting  Some effort noted toward treatment goal   Continue AT to encourage the development and practice of creating vision boards to decrease symptoms and support wellness  Tx Plan Objective: 1 1, 1 2, 1 4   Therapist:  MORENA Abraham     Education Therapy   4559-5350 Yrn Cardenas actively shared in morning assessment and goal review  Presented as Receptive related to readiness to learn  Yrn Cardenas did complete goal from last treatment day identifying gaining hope, advocacy, responsibility, and support  did not present with any barriers to learning  [id number] Rosendo Juarez engaged throughout the treatment day  Was engaged in learning related to Illness, Medication, Aftercare and Wellness Tools  Staff utilized Verbal, Written, A/V and Demonstration teaching methods  Rosendo Juarez shared area of learning and set a goal for outside of program to mop the floors, make dinner, and spend time drawing        Tx Plan Objective: 1 1, 1 2, 1 4  Therapist:  MORENA Abraham    Case Management Note  CM did not meet with Rosendo Juarez today as it was not a scheduled meeting day and Rosendo Grijalva Larry did not request to meet with CM  CM will meet with Nadine Juarez  on next treatment day  MORENA Pearson    Current suicide risk : Low     Medications changes/added/denied? No    Treatment session number: 16    Individual Case Management Visit provided today? Yes     [facility] follow up physician's orders: Continue to follow orders

## 2022-11-07 NOTE — PSYCH
Subjective:     Patient ID: Sophia Benoit is a 28 y o  female  [facility] Clinical Progress Notes      Specialized Services Documentation  Therapist must complete separate progress note for each specific clinical activity in which the individual participated during the day  Group Psychotherapy ([id number])    The group focused on expressing their feelings through the mediums of creative writing and art  Group began with each person choosing a number from 1-100  Each number was assigned a different prompt (such as "I hope   ", "I am ", "Recovery is   ", "I forgive   ", etc) and then roughly 5-7 minutes was allotted for each member to write a poem, creative writing, paragraph, story, etc based on their assigned phrase  The group shared each of their writings and took a moment to explain/give each other positive feedback  The group was then opened up to allow any member to share their own creative writing from outside of program  The second exercise was as follows: the group was presented with an image on the television  Microtonal low-fi music was quietly played while each member wrote either a word association, poem, or short story based on/inspired by the visual/auditory stimulation  Group members were given a final exercise of using art to express their perception of an emotion  Sophia Benoit contributed to the group by participating/sharing in every exercise, promoting group discussions, and supporting peers  Lety Griffin  is actively working towards goals  Continue psychotherapy groups to encourage further exploration of needs, personal awareness, and skills      Tx Plan Objective: 1 1,1 2, 1 4   Therapist: Joe Coffman MS & KRYSTAL Valles

## 2022-11-07 NOTE — PSYCH
Subjective:    Patient ID: Dirk Murray is a 28 y o  female      [facility] Clinical Progress Notes      Specialized Services Documentation  Therapist must complete separate progress note for each specific clinical activity in which the individual participated during the day  Group Psychotherapy    [id number] Dirk Murray actively participated in a psychoeducational group focused on mental health and wellness support resources  Participants were given note-cards to record resources of interest, then observed the presentation of resourceful websites, including topics such as: multicultural psychiatrists / therapists, LGBTQ, DV / SA, housing, legal, drug & alcohol, employment, grief, eating disorder, and  support  Group members openly asked questions and shared personal experiences  Continued progress noted towards goal  Continue with psychotherapy to encourage further expansion of outpatient support networks      Tx Plan Objective 1 1, 1 2, 1 4 Therapist: Rekha Martinez, [address] [place] [address] Naty Treviño MS

## 2022-11-08 ENCOUNTER — OFFICE VISIT (OUTPATIENT)
Dept: PSYCHOLOGY | Facility: CLINIC | Age: 32
End: 2022-11-08

## 2022-11-08 DIAGNOSIS — F32.81 PMDD (PREMENSTRUAL DYSPHORIC DISORDER): ICD-10-CM

## 2022-11-08 DIAGNOSIS — F33.2 SEVERE EPISODE OF RECURRENT MAJOR DEPRESSIVE DISORDER, WITHOUT PSYCHOTIC FEATURES (HCC): Primary | ICD-10-CM

## 2022-11-08 NOTE — PSYCH
Subjective:     Patient ID: Heriberto Lilly is a 28 y o  female  [facility] Clinical Progress Notes      Specialized Services Documentation  Therapist must complete separate progress note for each specific clinical activity in which the individual participated during the day  Group Psychotherapy (5567-8500)    Today's group was centered around Emotional Freedom Tapping, a technique for altering/lessening negative emotions (backed by studies, patient testimony, and traditional chinese medicine)  The group learned what EFT is and in what ways it can be effective  Then, members learned/discussed the step by step process of EFT (in great detail)  Members practiced the EFT process together multiple times until the group felt comfortable doing it on their own after program today  Additionally, the group was given handouts which detailed ETF and how to do it  Heriberto Lilly contributed to this group by practicing along with the group and by recalling how EFT works/the method  Humphriesgama Ledezma is making progress towards their goals  Continue Psychotherapy to encourage further exploration of the self       Tx Plan Objective: 1 1,1 2, 1 4   Therapist: Isabel Calderon MS

## 2022-11-08 NOTE — PSYCH
Subjective:    Patient ID: Nahomi Alvarez is a 28 y o  female      [facility] Clinical Progress Notes      Specialized Services Documentation  Therapist must complete separate progress note for each specific clinical activity in which the individual participated during the day  Education Therapy   7767-6626  Nahomi Alvarez actively shared in check in and goal review  Presented as Receptive related to readiness to learn  Nahomi Alvarez  did complete goal from last treatment day identifying gaining support and hope  did not present with any barriers to learning  [id number]  Santosluna Juarez engaged throughout the treatment day  Was engaged in learning related to Illness, Medication, Aftercare and Wellness Tools  Staff utilized Verbal, Written, A/V and Demonstration teaching methods  Santos AyonZionvahid shared area of learning and set a goal for outside of program to cook and put away mugs  Tx Plan Objective: 1 1, 1 2, 1 4, Therapist: Citlali Amaya    Group Psychotherapy    6299-7732 Nahomi Alvarez participated actively in a processing group focused on peer support  Group members were guided in a collaborative activity orchestrated to promote cohesion and focused energy  Participants were then given blank slips of paper and time to write anonymous questions or other queries to be read aloud to and processed with the group  A disclaimer was made that the group will not touch on topics regarding abuse, addiction, or other traumas  The slips were folded and collected in a bin, then one at a time, opened and read aloud by   The floor was opened to group members to offer insight, support, ideas, suggestions, etc  to the anonymous member behind the submission   Santos AyonZionvahid offered peer support by talking about what helps her with positive communication, advice for self-love, advice about travelling, and more  good  progress noted towards goals  Continue with psychotherapy to further encourage development of support networks and comfortability with asking for help      Tx Plan Objective: 1 1, 1 2, 1 4    Therapist: KRYSTAL Hampton

## 2022-11-08 NOTE — PSYCH
Subjective:     Patient ID: Merlin Mcrae is a 28 y o  female  [facility] Clinical Progress Notes      Specialized Services Documentation  Therapist must complete separate progress note for each specific clinical activity in which the individual participated during the day  Group Psychotherapy (9:30-10:30) Cecilia Marrufo was present for this psychotherapy group on acceptance and commitment regarding thoughts and emotions  Participants began with brief reflection/meditation on radical acceptance including some deep breathing  Participants then learned and discussed the three initial steps of acceptance and commitment therapy; acceptance, cognitive defusion and being present  Participants explored cognitive defusion further with an exercise in which the hands covering the face are a metaphor for one's thoughts and how when fused in our minds they can shield us from the outside environment and others, but when the hands are placed down away from the face they can be looked at and noticed at a distance while one is able to take in more information and connect with the environment  Participants learned and discussed some defusion techniques such as just noticing, thanking the mind, mindful watching (and practiced this activity) and repeating the though outloud in a different voice or in song until it becomes just noise  Participants completed some journal prompts on the cost of avoiding negative thoughts and emotions and shared reflections with the group  Participants ended with reading two pieces on mindfulness and acceptance, "The [address] [place] [address] [address]," and "Demons on the Boat' and reflecting on them  Angeles Allyson was attentive during the group and recommended to continue participation in group to learn skills to meet her treatment goals    Tx Goal Objective: 1 1,1 2  Therapist: KHADIJAH Sheth

## 2022-11-08 NOTE — PSYCH
Subjective:     Patient ID: Maria C Perez is a 28 y o  female  [facility] Clinical Progress Notes      Specialized Services Documentation  Therapist must complete separate progress note for each specific clinical activity in which the individual participated during the day  Case Management Note  [id number]- CM met with Cecilia  Camron Alvarado reported that she has officially established her moving date to [address] [facility] with family  She reported that she is experiencing frustration with her partner as her partner continues to make empty promises as to how they want to work on improving their relationship  [place] Alvarado reported that she has concerns regarding her transportation and is relying on her partner to fix it, but she knows he is procrastinating to sabotage her move  Cecilia and JUMANA reviewed affirmation statements and mantras to work on in regards to learning how to rely on herself and loved ones during this transition  Nika discussed her researching which support groups she will attend weekly until she transitions to MORENA Morales    Current suicide risk : Low     Medications changes/added/denied? No    Treatment session number: 17    Individual Case Management Visit provided today? Yes     [facility] follow up physician's orders: Continue to follow orders

## 2022-11-09 ENCOUNTER — OFFICE VISIT (OUTPATIENT)
Dept: PSYCHOLOGY | Facility: CLINIC | Age: 32
End: 2022-11-09

## 2022-11-09 ENCOUNTER — OFFICE VISIT (OUTPATIENT)
Dept: PSYCHIATRY | Facility: CLINIC | Age: 32
End: 2022-11-09

## 2022-11-09 DIAGNOSIS — F33.2 SEVERE EPISODE OF RECURRENT MAJOR DEPRESSIVE DISORDER, WITHOUT PSYCHOTIC FEATURES (HCC): Primary | ICD-10-CM

## 2022-11-09 DIAGNOSIS — F32.81 PMDD (PREMENSTRUAL DYSPHORIC DISORDER): ICD-10-CM

## 2022-11-09 DIAGNOSIS — F32.81 PMDD (PREMENSTRUAL DYSPHORIC DISORDER): Primary | ICD-10-CM

## 2022-11-09 NOTE — PSYCH
Visit Time    Visit Start Time: 7694  Visit Stop Time: 1956  Total Visit Duration: 15 minutes     This note was not shared with the patient due to reasonable likelihood of causing patient harm     PHP MEDICATION MANAGEMENT NOTE        Anthony Jimenez    Name and Date of Birth:  Stephan Juarez 28 y o  1990 MRN: [id number]    Date of Visit: November 9, 2022    No Known Allergies  SUBJECTIVE:    Indira Sousa is seen today for a follow up for Major Depressive Disorder  She reports that she has done fairly well since the last visit  Patient reports she has run into some barriers to leaving significant other and moving to [place]  She is reliant on significant other to fix the brakes on her car  States significant others resistant to her leaving  Frustrated that she may need to wait to save money until able to leave  Patient reports she is running on medication, medication refills available for patient at pharmacy  She denies any side effects from medications  PLAN:  Continue Effexor  mg p o  Daily  Aware of 24 hour and weekend coverage for urgent situations accessed by calling [facility] main practice number  Continue partial hospitalization program    Diagnoses and all orders for this visit:    PMDD (premenstrual dysphoric disorder)      Current Outpatient Medications on File Prior to Visit   Medication Sig Dispense Refill   • venlafaxine (EFFEXOR-XR) 150 mg 24 hr capsule Take 1 capsule (150 mg total) by mouth daily Take one 150mg capsule with one 75mg capsule at bedtime for total bedtime dose of 225mg  30 capsule 1   • venlafaxine (EFFEXOR-XR) 75 mg 24 hr capsule Take 1 capsule (75 mg total) by mouth daily Take one 150mg capsule with one 75mg capsule at bedtime for total bedtime dose of 225mg  30 capsule 1     No current facility-administered medications on file prior to visit         Psychotherapy Provided: Individual psychotherapy provided: Yes  Counseling was provided during the session today for 16 minutes  Supportive counseling provided  HPI ROS Appetite Changes and Sleep:     She reports early morning awakening, adequate appetite, low energy   Denies homicidal ideation, denies suicidal ideation    Review Of Systems:      General emotional problems, decreased functioning, sleep disturbances and relationship problems   Personality no change in personality   Constitutional negative   ENT negative   Cardiovascular negative   Respiratory negative   Gastrointestinal negative   Genitourinary negative   Musculoskeletal negative   Integumentary negative   Neurological negative   Endocrine negative   Other Symptoms none, all other systems are negative     Mental Status Evaluation:    Appearance Adequate hygiene and grooming   Behavior calm and guarded   Mood depressed and irritable  Depression Scale -  of 10 (0 = No depression)  Anxiety Scale -  of 10 (0 = No anxiety)   Speech Normal rate and volume   Affect appropriate and mood-congruent   Thought Processes Goal directed and coherent   Thought Content Does not verbalize delusional material   Associations Tightly connected   Perceptual Disturbances Denies hallucinations and does not appear to be responding to internal stimuli   Risk Potential Suicidal/Homicidal Ideation - No evidence of suicidal or homicidal ideation and patient does not verbalize suicidal or homicidal ideation  Risk of Violence - No evidence of risk for violence found on assessment  Risk of Self Mutilation - No evidence of risk for self mutilation found on assessment   Orientation oriented to person, place, time/date and situation   Memory recent and remote memory grossly intact   Consciousness alert and awake   Attention/Concentration attention span and concentration are age appropriate   Insight intact   Judgement intact   Muscle Strength and Gait normal muscle strength and normal muscle tone, normal gait/station and normal balance   Motor Activity no abnormal movements   Language no difficulty naming common objects, no difficulty repeating a phrase, no difficulty writing a sentence   Fund of Knowledge adequate knowledge of current events  adequate fund of knowledge regarding past history  adequate fund of knowledge regarding vocabulary      Past Psychiatric History Update:     Inpatient Psychiatric Admission Since Last Encounter:   no  Suicide Attempt Or Self Mutilation Since Last Encounter:   no  Incidence of Violent Behavior Since Last Encounter:   no    Traumatic History Update:     New Onset of Abuse Since Last Encounter:   no  Traumatic Events Since Last Encounter:   no    Past Medical History:    Past Medical History:   Diagnosis Date   • Anxiety    • Depression         Past Surgical History:   Procedure Laterality Date   • SALPINGECTOMY       No Known Allergies  Substance Abuse History:    Social History     Substance and Sexual Activity   Alcohol Use Not Currently   • Alcohol/week: 10 0 standard drinks   • Types: 10 Glasses of wine per week    Comment: every other weekend, last time 10/5/22     Social History     Substance and Sexual Activity   Drug Use Never     Social History:    Social History     Socioeconomic History   • Marital status: Single     Spouse name: Not on file   • Number of children: 5   • Years of education: college student   • Highest education level: Not on file   Occupational History   • Not on file   Tobacco Use   • Smoking status: Never Smoker   • Smokeless tobacco: Never Used   Vaping Use   • Vaping Use: Never used   Substance and Sexual Activity   • Alcohol use: Not Currently     Alcohol/week: 10 0 standard drinks     Types: 10 Glasses of wine per week     Comment: every other weekend, last time 10/5/22   • Drug use: Never   • Sexual activity: Not on file   Other Topics Concern   • Not on file   Social History Narrative   • Not on file     Social Determinants of Health Financial Resource Strain: Not on file   Food Insecurity: Not on file   Transportation Needs: Not on file   Physical Activity: Not on file   Stress: Not on file   Social Connections: Not on file   Intimate Partner Violence: Not on file   Housing Stability: Not on file     Family Psychiatric History:     Family History   Problem Relation Age of Onset   • No Known Problems Mother    • No Known Problems Father    • Drug abuse Cousin    • Alcohol abuse Cousin    • Psychiatric Illness Neg Hx      History Review: The following portions of the patient's history were reviewed and updated as appropriate: allergies, current medications, past family history, past medical history, past social history, past surgical history and problem list     OBJECTIVE:     Vital signs in last 24 hours: There were no vitals filed for this visit  Laboratory Results: I have personally reviewed all pertinent laboratory/tests results  Suicide/Homicide Risk Assessment:    Risk of Harm to Self:  • The following ratings are based on assessment at the time of the interview  • Recent Specific Risk Factors include: current depressive symptoms    Risk of Harm to Others:  • The following ratings are based on assessment at the time of the interview  • Recent Specific Risk Factors include: none  The following interventions are recommended: no intervention changes needed    Medications Risks/Benefits:      Risks, Benefits And Possible Side Effects Of Medications:    Discussed risks and benefits of treatment with patient including risk of suicidality, serotonin syndrome, increased QTc interval and SIADH related to treatment with antidepressants;  Risk of induction of manic symptoms in certain patient populations     Controlled Medication Discussion:     Not applicable        Clarise Brittle, CRNP 11/09/22

## 2022-11-09 NOTE — PSYCH
Subjective:     Patient ID: Jaky Strong is a 28 y o  female  [facility] Clinical Progress Notes      Specialized Services Documentation  Therapist must complete separate progress note for each specific clinical activity in which the individual participated during the day  Allied Therapy  [id number] group focused on self-advocacy skills via verbal and non-verbal communication, problem solving, and how to listen to others  Each group member was given a word from a song title  Each group member had to trade a card and receive a card in order to create a song title  As a group they were given several different prompts to assemble their song title involving teamwork and problem solving  The group then discussed their reactions to the group experience and group dynamics they observed  Group made connections via discussion how what they observed in group relates to the outside world when advocating for their needs  Group received education regarding the 3 types of listening and 6 steps to advocating for her needs  Jaky Strong participated in group by participating each experience and sharing personal insights regarding her struggles with advocating for herself  Some effort noted towards treatment progress  Continue AT to encourage self-advocating for needs and desires to increase wellness and decrease symptoms  Treatment Objectives: 1 1, 1 2, 1 4  Therapists: MORENA Snyder & JOSE ROBERTO Manley     Case Management Note  JUMANA did not meet with Jaky Strong today as it was not a scheduled meeting day and Jaky Strong  did not request to meet with CM  CM will meet with Ez Juarez  on next treatment day  After 11:45 AM Reg cason requested to leave program due to not feeling well  MORENA Snyder    Current suicide risk : Low     Medications changes/added/denied?  No    Treatment session number: 17    Individual Case Management Visit provided today? Yes     [facility] follow up physician's orders: Continue to follow orders

## 2022-11-09 NOTE — PSYCH
Subjective:    Patient ID: Irais Cleaning is a 28 y o  female      [facility] Clinical Progress Notes      Specialized Services Documentation  Therapist must complete separate progress note for each specific clinical activity in which the individual participated during the day  Education Therapy   [id number]  Irais Cleaning actively shared in check in and goal review  Presented as receptive related to readiness to learn  Irais Cleaning  did not complete goal from last treatment day identifying being tired as a barrier  did not present with any barriers to learning  Tx Plan Objective: 1 1, 1 2, 1 4, Therapist: Citlali Kam Psychotherapy    [id number] Irais Cleaning participated actively in a continuation of a processing group focused on peer support  Group members were guided in a collaborative activity orchestrated to promote cohesion and focused energy  Participants who were not present for previous group session / participants who requested paper were then given blank slips of paper and time to write anonymous questions or other queries to be read aloud to and processed with the group  A disclaimer was made that the group will not touch on topics regarding abuse, addiction, or other traumas  The new slips were folded and collected into the bin containing the submissions that were not covered in the first iteration of the group, then one at a time, opened and read aloud by   The floor was opened to group members to offer insight, support, ideas, suggestions, etc  to the anonymous member behind the submission  Devan Juarez openly shared  about what helps her self confidence and how taking space helps her cope in the moment  good  progress noted towards goals  Continue with psychotherapy to further encourage development of support networks and comfortability with asking for help      Tx Plan Objective: 1  1, 1 2, 1 4    Therapist: KRYSTAL Horner

## 2022-11-09 NOTE — PSYCH
Subjective:     Patient ID: Gema Hayward is a 28 y o  female  [facility] Clinical Progress Notes      Specialized Services Documentation  Therapist must complete separate progress note for each specific clinical activity in which the individual participated during the day  Group Psychotherapy  (2361-2580)  CPS Jevon Baird shared her life story as she co-led this session  The guest speaker presentation about KHALIF encouraged group members to reflect on their mental health journey  regarding the power of learning about self, accepting illness, and personal responsibility in recovery  ALLAN Dawkins reviewed community resources in addition to personal resources like positive affirmations and an acronym she uses to remind her about what tools she should use on a daily basis to maintain mental health wellness  Gema Hayward was not present for Mariza's presentation, as she left program early  Continue AT to encourage self -awareness and healthy engagement of supports      TX Plan Objectives: 1 1, 1 2, 1 4   Therapist: MORENA Herrera & June Jones 587, MTI

## 2022-11-10 ENCOUNTER — DOCUMENTATION (OUTPATIENT)
Dept: PSYCHOLOGY | Facility: CLINIC | Age: 32
End: 2022-11-10

## 2022-11-10 ENCOUNTER — APPOINTMENT (OUTPATIENT)
Dept: PSYCHOLOGY | Facility: CLINIC | Age: 32
End: 2022-11-10

## 2022-11-10 NOTE — PROGRESS NOTES
[facility] Discharge Instructions:   Disposition: home  Address: [address] [place] [address]    Diagnosis:   Severe episode of recurrent major depressive disorder, without psychotic features ([address] )     PMDD (premenstrual dysphoric disorder)      Allergies (Drug/Food): No Known Allergies  Activity: No activity restrictions  Diet:no recommendations  Smoking Cessation:not a smoker   Diagnostic/Laboratory Orders: No labs ordered  Vaccines: If you received a vaccine, please notify your family physician on your next visit  For more information, please call [phone]  Follow-up appointments/Referrals:   Medication Management:  Mary Jo Camilo  [address], [address] [place] [address]  (p): [phone]   Appointment Time: 12/01/22 @ 2 PM; 1 mo follow-up 12/27/22 @ 2 PM      Outpatient Therapist:   Sriram Loyola was given an outpatient therapist provider list on 10/25/22  It is us up to Dock Salm to create an appointment with a OP therapist of her choosing  Due to recent life transitions, Sriram Loyola will also be provided a list of OP providers for psychiatry and OP therapy located in another [facility]  Primary Care Physician:   Dr Ceci Lee  Counts include 234 beds at the [facility]   [place], [address]   (p): [phone]       ICM/CTT: [address] [place] [address] [phone]  Intake/Referral/Evaluation (Non-Emergency) *NON INSURED FOR FUNDING: [facility]: [phone], [facility]: [phone], [facility]: [phone] and [place]: [phone]  Crisis Intervention (Emergency) [place] Service: [facility]: [phone], [place]: [phone], [address] [place] [address]: [phone], Rocco Patrick [facility]): [phone], Elena Patrick: [phone] and C/M/P: [phone]  [place] Peer/Warm Line: [phone]     HOURS: 6 AM to 2 AM DAILY    Text 988- Suicide and Crisis Lifeline        _________________________________  [employer] Intervention Hotline: 8-337.520.7054    National Suicide Crisis Hotline: 6-547-438-587-955-2814  I, the undersigned, have received and understand the above instructions          Patient/Rep Signature: __________________________________       Date/Time: ______________         Relationship: __________________________________________       Date/Time: ______________         Physician Signature: ____________________________________      Date/Time: ______________               Signature: ________________________________       Date/Time: ______________

## 2022-11-10 NOTE — PROGRESS NOTES
Subjective:     Patient ID: Eleazar Bustamante is a 28 y o  female  [facility] Clinical Progress Notes      Specialized Services Documentation  Therapist must complete separate progress note for each specific clinical activity in which the individual participated during the day  Case Management Note  Malika Drake cancelled from program today due to being sick as well as her whole family  Return to program date is unknown due to illness  Her next treatment day will be her discharge day  MORENA Morse    Current suicide risk : Unable to assess due to absence  Medications changes/added/denied? No    Treatment session number: N/A    Individual Case Management Visit provided today? No    [facility]ations follow up physician's orders: Continue to follow orders

## 2022-11-10 NOTE — BH TREATMENT PLAN
Assessment/Plan:        Diagnoses and all orders for this visit:     Severe episode of recurrent major depressive disorder, without psychotic features ([address] )     PMDD (premenstrual dysphoric disorder)            Subjective:      Patient ID: Cecilia Juarez is a 28 y o   female       [facility] Treatment Plan   AREAS OF NEED:  Shawna Juarez has experienced worsening depression symptoms as evidenced by suicidal ideation (no plan or intent currently, however, 1 week ago she engaged in a plan and action to complete it), weight loss, decreased appetite, difficulty concentrating, decreased motivation and energy, difficulty complete activities of daily living, and decreased interest in completing pleasurable activities due to relationship stress with boyfriend, natural parenting stressors, unmanaged PMDD symptoms, and school       As per Cecilia WillsRaghavleodan- "I know what I have to do, but the thought of beginning it, I find very stressful "      Date Initiated:  10/14/22  Strengths:  Determined, open-minded, understanding, patient, and a problem-solver               LONG TERM GOAL:   Date Initiated: 10/14/22  1 0 By the end of program, I will identify 3 ways my mental health has improved, 3 ways I am able to follow or maintain a schedule, and 3 coping skills that assist in maintaining my mental health    Target Date: 11/11/22  Completion Date:         SHORT TERM OBJECTIVES:      Date Initiated: 10/14/22  1 1 On a daily basis, I will complete 2 manageable activities of daily living (1 load of laundry, clean dishes, etc) and 1 pleasurable activity for at least 15 minutes to begin completing and following a schedule    Revision Date: 10/27/22; 11/07/22  Target Date: 10/26/22  Completion Date:      Date Initiated: 10/14/22  1 2 By the end of program, I will learn at least 3 mindfulness or grounding strategies that will assist me in decreasing my emotional intensity and assist me in remaining within the present moment    Revision Date: 10/27/22; 11/07/22  Target Date: 10/26/22  Completion Date:     Date Initiated: 10/14/22  1 3 I will take medications as prescribed and share questions and concerns if arise     Revision Date: 10/27/22; 11/07/22  Target Date: 10/26/22  Completion Date:      Date Initiated: 10/14/22  1 4 I will identify 3 ways my supports can assist in my recovery and agree to staff/support contact as indicated     Revision Date: 10/27/22; 11/07/22  Target Date: 10/26/22  Completion Date:            7 DAY REVISION:  1 5: To maintain my wellness while experiencing PMDD symptoms, I will write down 1 manageable goal I want to accomplish each day in my identified mental health wellness areas I am working on  Date Initiated: 10/27/22  Revision Date: 11/07/22  Target Date: 11/07/22  Completion Date:    1 6: I will research about 3 living arrangements within the local area of [facility] and create a 2 month budget to assist me in my life transition and decrease anxiety  Date Initiated: 11/10/22  Revision Date:   Target Date: 11/21/22  Completion Date:        PSYCHIATRY:  Date Initiated:  10/14/22  Medication Management and Education       Revision Date: 10/27/22; 11/07/22       1 3 Continue medication management   The person(s) responsible for carrying out the plan is Margareth Musa MD     NURSING:   Date Initiated: 10/14/22  1 1,1 2,1 3,1 4 This therapist will provide wellness/symptoms and skill education groups three to five days weekly to educate  Nilson Mcmahon Larry on signs and symptoms of diagnoses, skills to manage, and medication questions that will be addressed by the treatment team     Revision Date: 10/27/22; 11/07/22        1 1,1 2,1 3,1 4,1 5 Continue to encourage Eleazar Bustamante to participate in wellness groups daily to learn about symptoms, coping strategies and warning signs to promote relapse prevention     The person(s) responsible for carrying out the plan is Jose R Davis MS      PSYCHOLOGY:   Date Initiated: 10/14/22       1 1, 1 2, 1 4 Provide psychotherapy group 5 times per week to allow opportunity for Jie AyonZionvahid  to explore stressors and ways of coping  Revision Date: 10/27/22; 11/07/22  1 1,1 2,1 4,1 5  Continue to provide psychotherapy group daily to [employer] and encourage sharing of stressors, skills and positive change  The person(s) responsible for carrying out the plan is Zulema Beltran MS     ALLIED THERAPY:   Date Initiated: 10/14/22  1 1,1 2 Engage  Jie SueroVitorleodan in AT group 5 times per week to encourage development and use of wellness tools to decrease symptoms and promote recovery through meaningful activity  Revision Date: 10/27/22; 11/07/22  1 1,1 2,1 5 Continue to engage [employer] to participate in AT group to practice wellness tools within program and transfer to home sharing successes and barriers through healthy task involvement  The person(s) responsible for carrying out the plan is MORENA Mcneil and MORENA Cerda     CASE MANAGEMENT:   Date Initiated: 10/14/22      1 0 This  will meet with Jie Rebecca Juarez  3-4 times weekly to assess treatment progress, discharge planning, connection to community supports and UR as indicated  Revision Date: 10/27/22; 11/07/22   1 0 Continue to meet with Jie Juarez 3-4 times weekly to assess growth, work toward goals, continued treatment needs, dc planning and use of supports     The person(s) responsible for carrying out the plan is MORENA Cerda     TREATMENT REVIEW/COMMENTS:      DISCHARGE CRITERIA: Identify 3 signs of progress and complete relapse prevention plan     DISCHARGE PLAN: Connect with outpatient providers  Estimated Length of Stay: 10 treatment days      Diagnosis and Treatment Plan explained to Cecilia Brooks relates understanding diagnosis and is agreeable to Treatment Plan       CLIENT COMMENTS / Please share your thoughts, feelings, need and/or experiences regarding your treatment plan with Staff  Diana Genao see follow up note with comments      Signatures can be found on [facility] Treatment plan consent form     Diagnosis and Treatment Plan explained to Marie Lomax relates understanding diagnosis and is agreeable to Treatment Plan       CLIENT COMMENTS / Please share your thoughts, feelings, need and/or experiences regarding your treatment plan with Staff  Please see follow up note with comments      Signatures can be found on [facility] Treatment plan consent form

## 2022-11-11 ENCOUNTER — OFFICE VISIT (OUTPATIENT)
Dept: PSYCHOLOGY | Facility: CLINIC | Age: 32
End: 2022-11-11

## 2022-11-11 DIAGNOSIS — F33.2 SEVERE EPISODE OF RECURRENT MAJOR DEPRESSIVE DISORDER, WITHOUT PSYCHOTIC FEATURES (HCC): Primary | ICD-10-CM

## 2022-11-11 DIAGNOSIS — F32.81 PMDD (PREMENSTRUAL DYSPHORIC DISORDER): ICD-10-CM

## 2022-11-11 NOTE — PSYCH
Subjective:     Patient ID: Connie Flores is a 28 y o  female  [facility] Clinical Progress Notes      Specialized Services Documentation  Therapist must complete separate progress note for each specific clinical activity in which the individual participated during the day  Case Management Note  [id number]---Met with Kati Juarez  Manuel Arshad presented as tearful during case management  She stated that she is having difficulty accepting that she will need to work for a couple of months to save enough money before she moves  Manuel Arshad reported that she does not have any friends in the area and does not want to be living at a homeless shelter during this transition  Reviewed relapse prevention plan, aftercare plan, crisis plan interventions, and medication list (copies provided)  Kati Juarez shared 3 ways their mental has improved: setting boundaries with others, communicating with loved ones more effectively, and self-soothing to decrease the intensity of intrusive thoughts 3 takeaways from program include[de-identified] distress tolerance skills, STOP skill, TIP skill, and ACCEPTS skill  Denied SI, HI, and psychosis  Aftercare providers to receive summary  [phone]- CM called Manuel Arshad via [employer] in HIPAA compliant office to review her updated tx plan  Due to Manuel Arshad presenting as tearful during case management, CM became distracted and forgot to complete the revision  Manuel Arshad verbally consented and agrees to updated tx plan  MORENA Ellington    Current suicide risk : Low     Medications changes/added/denied? No    Treatment session number: 18    Individual Case Management Visit provided today? Yes     [facility] follow up physician's orders: Discharge from [facility]  Continue care with OP providers
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
## 2022-11-11 NOTE — PSYCH
Subjective:     Patient ID: Irais Cleaning is a 28 y o  female  [facility] Clinical Progress Notes      Specialized Services Documentation  Therapist must complete separate progress note for each specific clinical activity in which the individual participated during the day  Group Psychotherapy (10:45-11:45) Cecilia Bang was present for this group focused on deep breathing and Carter Chi  Participants learned four  different deep breathing techniques, diaphragm breathing, ocean breathing, single nostril breathing and lion breathing  Participants discussed the benefits of deep breathing and Carter Chi, known as meditation in motion  Participants then practiced five Carter Chi movements together with focus on deep breathing and slow controlled movements  Participants were invited to do this standing or sitting  Participants ended with a mindful listening activity, listening to other group members share answers to two prompts and a team exercise of coming up with as many coping skills they can apply to difficult situations/emotions in their group  Remy Jessee was attentive and participated throughout the group  She is recommended to continue group participation and utilization of skills to meet her treatment goals       Tx Goal Objective: 1 1,1 2  Therapist: KHADIJAH Vasquez

## 2022-11-11 NOTE — PSYCH
Assessment/Plan:        Diagnoses and all orders for this visit:     Severe episode of recurrent major depressive disorder, without psychotic features (H[facility])     PMDD (premenstrual dysphoric disorder)            Subjective:      Patient ID: Cecilia Juarez is a 28 y  o   female       [facility] Treatment Plan   AREAS OF Diomedes Juarez has experienced worsening depression symptoms as evidenced by suicidal ideation (no plan or intent currently, however, 1 week ago she engaged in a plan and action to complete it), weight loss, decreased appetite, difficulty concentrating, decreased motivation and energy, difficulty complete activities of daily living, and decreased interest in completing pleasurable activities due to relationship stress with boyfriend, natural parenting stressors, unmanaged PMDD symptoms, and school       As per Cecilia AyonSuri- "I know what I have to do, but the thought of beginning it, I find very stressful "      Date Initiated:  10/14/22  Strengths:  Determined, open-minded, understanding, patient, and a problem-solver               LONG TERM GOAL:   Date Initiated: 10/14/22  1 0 By the end of program, I will identify 3 ways my mental health has improved, 3 ways I am able to follow or maintain a schedule, and 3 coping skills that assist in maintaining my mental health    Target Date: 11/11/22  Completion Date: 11/11/22        SHORT TERM OBJECTIVES:      Date Initiated: 10/14/22  1 1 On a daily basis, I will complete 2 manageable activities of daily living (1 load of laundry, clean dishes, etc) and 1 pleasurable activity for at least 15 minutes to begin completing and following a schedule    Revision Date: 10/27/22; 11/07/22  Target Date: 10/26/22  Completion Date: 11/11/22     Date Initiated: 10/14/22  1 2 By the end of program, I will learn at least 3 mindfulness or grounding strategies that will assist me in decreasing my emotional intensity and assist me in remaining within the present moment    Revision Date: 10/27/22; 11/07/22  Target Date: 10/26/22  Completion Date: 11/11/22     Date Initiated: 10/14/22  1 3 I will take medications as prescribed and share questions and concerns if arise     Revision Date: 10/27/22; 11/07/22  Target Date: 10/26/22  Completion Date: 11/11/22     Date Initiated: 10/14/22  1 4 I will identify 3 ways my supports can assist in my recovery and agree to staff/support contact as indicated     Revision Date: 10/27/22; 11/07/22  Target Date: 10/26/22  Completion Date: 11/11/22            7 DAY REVISION:  1 5: To maintain my wellness while experiencing PMDD symptoms, I will write down 1 manageable goal I want to accomplish each day in my identified mental health wellness areas I am working on  Date Initiated: 10/27/22  Revision Date: 11/07/22  Target Date: 11/07/22  Completion Date: 11/11/22     1 6: I will research about 3 living arrangements within the local area of [facility] and create a 2 month budget to assist me in my life transition and decrease anxiety  Date Initiated: 11/10/22  Revision Date:   Target Date: 11/21/22  Completion Date:  To continue upon DC from program         PSYCHIATRY:  Date Initiated:  10/14/22  Medication Management and Education       Revision Date: 10/27/22; 11/07/22       1 3 Continue medication management   The person(s) responsible for carrying out the plan is Margareth Musa MD     NURSING:   Date Initiated: 10/14/22  1 1,1 2,1 3,1 4 This therapist will provide wellness/symptoms and skill education groups three to five days weekly to Jamaal Blair Juarez on signs and symptoms of diagnoses, skills to manage, and medication questions that will be addressed by the treatment team     Revision Date: 10/27/22; 11/07/22        1 1,1 2,1 3,1 4,1 5 Continue to encourage Kandacejigar Ananth Juarez to participate in wellness groups daily to learn about symptoms, coping strategies and warning signs to promote relapse prevention  The person(s) responsible for carrying out the plan is Arleen Sams MS      PSYCHOLOGY:   Date Initiated: 10/14/22       1 1, 1 2, 1 4 Provide psychotherapy group 5 times per week to allow opportunity for Kati Williamson Larry  to explore stressors and ways of coping  Revision Date: 10/27/22; 11/07/22  1 1,1 2,1 4,1 5  Continue to provide psychotherapy group daily to Kati Villatoroantonia Juarez and encourage sharing of stressors, skills and positive change  The person(s) responsible for carrying out the plan is Ino Jackson MS     ALLIED THERAPY:   Date Initiated: 10/14/22  1 1,1 2 Engage  Cecilia AyonZionvahid in AT group 5 times per week to encourage development and use of wellness tools to decrease symptoms and promote recovery through meaningful activity  Revision Date: 10/27/22; 11/07/22  1 1,1 2,1 5 Continue to engage Kati Williamson Larry to participate in AT group to practice wellness tools within program and transfer to home sharing successes and barriers through healthy task involvement  The person(s) responsible for carrying out the plan is MORENA Zendejas and MORENA Ellington     CASE MANAGEMENT:   Date Initiated: 10/14/22      1 0 This  will meet with Kati Juarez  3-4 times weekly to assess treatment progress, discharge planning, connection to community supports and UR as indicated  Revision Date: 10/27/22; 11/07/22   1 0 Continue to meet with Kati Juarez 3-4 times weekly to assess growth, work toward goals, continued treatment needs, dc planning and use of supports     The person(s) responsible for carrying out the plan is MORENA Ellington     TREATMENT REVIEW/COMMENTS:      DISCHARGE CRITERIA: Identify 3 signs of progress and complete relapse prevention plan     DISCHARGE PLAN: Connect with outpatient providers  Estimated Length of Stay: 10 treatment days      Diagnosis and Treatment Plan explained to Cecilia Brooks relates understanding diagnosis and is agreeable to Treatment Plan       CLIENT COMMENTS / Please share your thoughts, feelings, need and/or experiences regarding your treatment plan with Staff  Cindy Perkins see follow up note with comments      Signatures can be found on [facility] Treatment plan consent form     Diagnosis and Treatment Plan explained to Iwona Penny understanding diagnosis and is agreeable to Treatment Plan       CLIENT COMMENTS / Please share your thoughts, feelings, need and/or experiences regarding your treatment plan with Staff  Cindy Perkins see follow up note with comments      Signatures can be found on [facility] Treatment plan consent form

## 2022-11-11 NOTE — PSYCH
[facility] Clinical Progress Notes      Specialized Services Documentation  Therapist must complete separate progress note for each specific clinical activity in which the individual participated during the day         [facility] follow up physician's orders:   DATE 11/11/2022  TIME 10:31  Jose Elias Parham MD

## 2022-11-11 NOTE — PSYCH
Subjective:     Patient ID: Ayaka Post is a 28 y o  female  [facility] Clinical Progress Notes      Specialized Services Documentation  Therapist must complete separate progress note for each specific clinical activity in which the individual participated during the day  Allied Therapy   [id number]  Ayaka Post  actively shared in AT group exploring DBT distress tolerance “crisis survival strategies”  Group explored crisis survival strategies “ACCEPTS, SELF-SOOTHING, TIP, STOP, IMPROVE and PROS & CONS) reinforcing actions one could take to learn to tolerate stressful experiences, thoughts and urges  Salud Mitzi felt she could put effort into practicing TIP  Positive progress toward goal noted  Discharge at the end of the treatment day    Tx Plan Objective: 1 1, Therapist:  Juan R HSUBC

## 2022-11-11 NOTE — PSYCH
Subjective:    Patient ID: Sathish Hernandez is a 28 y o  female      [facility] Clinical Progress Notes      Specialized Services Documentation  Therapist must complete separate progress note for each specific clinical activity in which the individual participated during the day  Education Therapy   [id number]  Sathish Hernandez actively shared in check in and goal review  Presented as receptive related to readiness to learn  Sathish Hernandez  did complete goal from last treatment day identifying gaining self-support  did not present with any barriers to learning  [id number]  Jony Juarez engaged throughout the treatment day  Was engaged in learning related to Illness, Medication, Aftercare and Wellness Tools  Staff utilized Verbal, Written, A/V and Demonstration teaching methods  Jony SueroRociovahid shared area of learning and set a goal for outside of program to continue making progress on her WRAP and utilizing skills gained in program       Tx Plan Objective: 1 1, 1 2, 1 4, Therapist: Citlali Cabezas    Group Psychotherapy    [id number] Sathish Hernandez quietly participated in a group focused on reframing language  Group members received an informational worksheet that covered the STOP skill for distress tolerance  The group went through several exercises covering rhetorical questions and ineffective vs effective communication  All members were given slips of paper to submit anonymous statements they have used before, and the group interpreted and reframed them together  good  progress noted towards goal  Continue with psychotherapy to work focus on positive language and effective communication       Tx Plan Objective 1 1, 1 2, 1 4 Therapist: Tee Arroyo